# Patient Record
Sex: FEMALE | Race: WHITE | Employment: OTHER | URBAN - METROPOLITAN AREA
[De-identification: names, ages, dates, MRNs, and addresses within clinical notes are randomized per-mention and may not be internally consistent; named-entity substitution may affect disease eponyms.]

---

## 2017-01-03 ENCOUNTER — CLINICAL SUPPORT (OUTPATIENT)
Dept: GERIATRIC MEDICINE | Age: 82
End: 2017-01-03

## 2017-01-03 DIAGNOSIS — M81.0 OSTEOPOROSIS: Primary | ICD-10-CM

## 2017-01-03 NOTE — Clinical Note
Pt had self-supplied prolia pre-filled syringe  injection from [facility] , that was ordered by Dr. Rocio Acosta to be given today. Next injection will be due in six months from today if still needed.

## 2017-01-03 NOTE — PROGRESS NOTES
Chief Complaint   Patient presents with    Injection     Q 6 months prolia injection     After obtaining consent, and per orders of Dr. Lin Davenport, injection of Prolia 60mg/ml  was given. Medication GTIN # [id number],   SN# [id number]   Lot # [id number]  exp. Date 04/2019  Pt had self-supplied pre-filled syringe ( 1ml=60mg) Prolia. Injection was given subcutaneously on the left deltoid. Patient instructed to wait in the lobby for 15 minutes post-injection for monitoring for any symptoms of reactions. None was reported. Her next injection will not be due for another six months.

## 2017-01-04 ENCOUNTER — OFFICE VISIT (OUTPATIENT)
Dept: GERIATRIC MEDICINE | Age: 82
End: 2017-01-04

## 2017-01-04 VITALS
HEIGHT: 59 IN | OXYGEN SATURATION: 97 % | BODY MASS INDEX: 22.98 KG/M2 | RESPIRATION RATE: 16 BRPM | TEMPERATURE: 96.1 F | HEART RATE: 66 BPM | DIASTOLIC BLOOD PRESSURE: 55 MMHG | WEIGHT: 114 LBS | SYSTOLIC BLOOD PRESSURE: 135 MMHG

## 2017-01-04 DIAGNOSIS — H61.21 HEARING LOSS DUE TO CERUMEN IMPACTION, RIGHT: Primary | ICD-10-CM

## 2017-01-04 DIAGNOSIS — H61.21 IMPACTED CERUMEN OF RIGHT EAR: ICD-10-CM

## 2017-01-04 NOTE — PROGRESS NOTES
Patient seen today for cerumen impaction of the right ear. Patient states she \"knows\" when this occurs because her hearing is decreased. She admits fullness in ear, but denies any ringing, pain, or discharge. Otherwise patient has no complaints today. She has tolerated cerumen removal well. Patient to follow up PRN    Orders written and sent to pharmacy for Debrox, 2 times weekly at night, 5 drops in the right ear.

## 2017-01-11 ENCOUNTER — OFFICE VISIT (OUTPATIENT)
Dept: GERIATRIC MEDICINE | Age: 82
End: 2017-01-11

## 2017-01-11 VITALS
RESPIRATION RATE: 16 BRPM | OXYGEN SATURATION: 97 % | DIASTOLIC BLOOD PRESSURE: 52 MMHG | HEART RATE: 66 BPM | BODY MASS INDEX: 22.66 KG/M2 | WEIGHT: 112.2 LBS | TEMPERATURE: 96 F | SYSTOLIC BLOOD PRESSURE: 138 MMHG

## 2017-01-11 DIAGNOSIS — L20.9 ATOPIC DERMATITIS, UNSPECIFIED: Primary | ICD-10-CM

## 2017-01-11 DIAGNOSIS — L29.9 ITCHING: ICD-10-CM

## 2017-01-11 RX ORDER — CLOTRIMAZOLE AND BETAMETHASONE DIPROPIONATE 10; .64 MG/G; MG/G
CREAM TOPICAL
Qty: 15 G | Refills: 0 | Status: SHIPPED | OUTPATIENT
Start: 2017-01-11 | End: 2018-10-16

## 2017-01-11 RX ORDER — CLOTRIMAZOLE AND BETAMETHASONE DIPROPIONATE 10; .64 MG/G; MG/G
CREAM TOPICAL
Qty: 15 G | Refills: 0 | Status: SHIPPED | OUTPATIENT
Start: 2017-01-11 | End: 2017-01-11 | Stop reason: SDUPTHER

## 2017-01-11 NOTE — PATIENT INSTRUCTIONS

## 2017-01-11 NOTE — PROGRESS NOTES
HISTORY OF PRESENT ILLNESS  Son Shore is a 80 y.o. female. Rash    Associated symptoms include itching. Ms. Page Burt presents today with complaints of a rash that started a few weeks ago and has progressed from her left shoulder to her left chest. Patient states she has not changed any lotion, body wash, detergents, or new clothing that might be causing her symptoms. She states the area is itchy and slightly bothersome. Patient states she has not tried anything at home. The patient does not know where she would have come in contact with anything to cause the rash but is concerned since it is moving down her chest.     Review of Systems   Constitutional: Negative for chills, fever and malaise/fatigue. HENT: Negative. Eyes: Negative. Respiratory: Negative. Cardiovascular: Negative. Gastrointestinal: Negative. Genitourinary: Negative. Musculoskeletal: Negative. Skin: Positive for itching and rash. Neurological: Negative. Negative for weakness. Psychiatric/Behavioral: Negative. Physical Exam   Constitutional: She is oriented to person, place, and time. She appears well-developed and well-nourished. HENT:   Head: Normocephalic and atraumatic. Mouth/Throat: Oropharynx is clear and moist.   Eyes: EOM are normal. Pupils are equal, round, and reactive to light. Neck: Normal range of motion. Neck supple. Cardiovascular: Normal rate, regular rhythm, normal heart sounds and intact distal pulses. Exam reveals no gallop and no friction rub. No murmur heard. Pulmonary/Chest: Effort normal and breath sounds normal. She has no wheezes. She has no rales. She exhibits no tenderness. Abdominal: Soft. Bowel sounds are normal.   Musculoskeletal: Normal range of motion. She exhibits no edema, tenderness or deformity. Lymphadenopathy:     She has no cervical adenopathy. Neurological: She is alert and oriented to person, place, and time. She has normal reflexes.    Skin: Skin is warm and dry. Rash noted. There is erythema. No pallor. Small patchy area of diffuse, slightly raised erythematous on left chest.      Psychiatric: She has a normal mood and affect. Her behavior is normal. Thought content normal.       ASSESSMENT and PLAN    ICD-10-CM ICD-9-CM    1. Atopic dermatitis, unspecified L20.9 691.8 clotrimazole-betamethasone (LOTRISONE) topical cream   2. Itching L29.9 698.9 clotrimazole-betamethasone (LOTRISONE) topical cream     Follow-up Disposition:  Return if symptoms worsen or fail to improve.

## 2017-03-24 ENCOUNTER — OFFICE VISIT (OUTPATIENT)
Dept: GERIATRIC MEDICINE | Age: 82
End: 2017-03-24

## 2017-03-24 VITALS
TEMPERATURE: 96.2 F | SYSTOLIC BLOOD PRESSURE: 136 MMHG | OXYGEN SATURATION: 95 % | HEIGHT: 59 IN | HEART RATE: 79 BPM | WEIGHT: 112.2 LBS | DIASTOLIC BLOOD PRESSURE: 51 MMHG | BODY MASS INDEX: 22.62 KG/M2

## 2017-03-24 DIAGNOSIS — Z00.00 MEDICARE ANNUAL WELLNESS VISIT, SUBSEQUENT: Primary | ICD-10-CM

## 2017-03-24 DIAGNOSIS — Z12.11 SCREEN FOR COLON CANCER: ICD-10-CM

## 2017-03-24 DIAGNOSIS — Z71.89 ADVANCED DIRECTIVES, COUNSELING/DISCUSSION: ICD-10-CM

## 2017-03-24 DIAGNOSIS — Z13.39 SCREENING FOR ALCOHOLISM: ICD-10-CM

## 2017-03-24 DIAGNOSIS — Z13.31 SCREENING FOR DEPRESSION: ICD-10-CM

## 2017-03-24 NOTE — PROGRESS NOTES
Subjective:     Chief Complaint   Patient presents with    Annual Wellness Visit     she is a 80y.o. year old female who presents for evalution. Current Outpatient Prescriptions   Medication Sig Dispense Refill    simvastatin (ZOCOR) 10 mg tablet Take 1 Tab by mouth nightly. Indications: HYPERLIPIDEMIA 90 Tab 1    calcium carbonate (CALTREX) 600 mg (1,500 mg) tablet Take 1 Tab by mouth two (2) times a day. 60 Tab 5    hydrochlorothiazide (HYDRODIURIL) 12.5 mg tablet Take 1 Tab by mouth daily. 90 Tab 1    acetaminophen (TYLENOL) 325 mg tablet Take  by mouth every four (4) hours as needed for Pain.  peg 400-propylene glycol (SYSTANE) 0.4-0.3 % drop Administer 1 Drop to both eyes four (4) times daily.  amoxicillin (AMOXIL) 500 mg capsule Take 500 mg by mouth. Take 5 caps one hour prior to dental appointments. Indications: prophylactic      atenolol (TENORMIN) 100 mg tablet Take 1 Tab by mouth daily. 90 Tab 3    metronidazole (METROLOTION) 0.75 % lotion Apply  to affected area nightly. (Patient taking differently: Apply  to affected area every morning. Indications: ACNE ROSACEA) 59 mL 1    aspirin 81 mg tablet Take 81 mg by mouth daily.  MULTIVITAMIN PO Take  by mouth daily.  clotrimazole-betamethasone (LOTRISONE) topical cream Apply twice daily to affected area x 10 days. 15 g 0    carbamide peroxide (DEBROX) 6.5 % otic solution Administer 5 Drops in right ear two (2) times a week. 150 mL 1    cholecalciferol (VITAMIN D3) 1,000 unit tablet Take 1 Tab by mouth daily.  30 Tab 11        Allergies   Allergen Reactions    Latex Rash    Orabase (Benzocaine) [Benzocaine] Anaphylaxis    Adhesive Rash    Codeine Nausea and Vomiting    Darvocet A500 [Propoxyphene N-Acetaminophen] Unknown (comments)    Hydrocodone Unknown (comments)        Objective:     Vitals:    03/24/17 0917   BP: 136/51   Pulse: 79   Temp: 96.2 °F (35.7 °C)   TempSrc: Oral   SpO2: 95%   Weight: 112 lb 3.2 oz (50.9 kg) Height: 4' 11\" (1.499 m)       ROS    Physical Exam

## 2017-03-24 NOTE — PROGRESS NOTES
Patient is here for medicare wellness. She is having a issue with loose bowels since increasing her calcium. Visit Vitals    /51 (BP 1 Location: Left arm, BP Patient Position: Sitting)    Pulse 79    Temp 96.2 °F (35.7 °C) (Oral)    Ht 4' 11\" (1.499 m)    Wt 112 lb 3.2 oz (50.9 kg)    SpO2 95%    BMI 22.66 kg/m2     1. Have you been to the ER, urgent care clinic since your last visit? Hospitalized since your last visit?no    2. Have you seen or consulted any other health care providers outside of the [facility] since your last visit? Include any pap smears or colon screening.  no

## 2017-03-24 NOTE — PATIENT INSTRUCTIONS
Medicare Part B Preventive Services Limitations Recommendation Scheduled   Bone Mass Measurement  (age 72 & older, biennial) Requires diagnosis related to osteoporosis or estrogen deficiency. Biennial benefit unless patient has history of long-term glucocorticoid tx or baseline is needed because initial test was by other method     Cardiovascular Screening Blood Tests (every 5 years)  Total cholesterol, HDL, Triglycerides Order as a panel if possible     Colorectal Cancer Screening  -Fecal occult blood test (annual)  -Flexible sigmoidoscopy (5y)  -Screening colonoscopy (10y)  -Barium Enema      Counseling to Prevent Tobacco Use (up to 8 sessions per year)  - Counseling greater than 3 and up to 10 minutes  - Counseling greater than 10 minutes Patients must be asymptomatic of tobacco-related conditions to receive as preventive service     Diabetes Screening Tests (at least every 3 years, Medicare covers annually or at 6-month intervals for prediabetic patients)    Fasting blood sugar (FBS) or glucose tolerance test (GTT) Patient must be diagnosed with one of the following:  -Hypertension, Dyslipidemia, obesity, previous impaired FBS or GTT  Or any two of the following: overweight, FH of diabetes, age ? 72, history of gestational diabetes, birth of baby weighing more than 9 pounds     Diabetes Self-Management Training (DSMT) (no USPSTF recommendation) Requires referral by treating physician for patient with diabetes or renal disease. 10 hours of initial DSMT session of no less than 30 minutes each in a continuous 12-month period. 2 hours of follow-up DSMT in subsequent years.      Glaucoma Screening (no USPSTF recommendation) Diabetes mellitus, family history, , age 48 or over,  American, age 72 or over     Human Immunodeficiency Virus (HIV) Screening (annually for increased risk patients)  HIV-1 and HIV-2 by EIA, MACIEJ, rapid antibody test, or oral mucosa transudate Patient must be at increased risk for HIV infection per USPSTF guidelines or pregnant. Tests covered annually for patients at increased risk. Pregnant patients may receive up to 3 test during pregnancy. Medical Nutrition Therapy (MNT) (for diabetes or renal disease not recommended schedule) Requires referral by treating physician for patient with diabetes or renal disease. Can be provided in same year as diabetes self-management training (DSMT), and CMS recommends medical nutrition therapy take place after DSMT. Up to 3 hours for initial year and 2 hours in subsequent years. Shingles Vaccination A shingles vaccine is also recommended once in a lifetime after age 61     Seasonal Influenza Vaccination (annually)      Pneumococcal Vaccination (once after 72)      Hepatitis B Vaccinations (if medium/high risk) Medium/high risk factors:  End-stage renal disease,  Hemophiliacs who received Factor VIII or IX concentrates, Clients of institutions for the mentally retarded, Persons who live in the same house as a HepB virus carrier, Homosexual men, Illicit injectable drug abusers. Screening Mammography (biennial age 54-69) Annually (age 36 or over)     Screening Pap Tests and Pelvic Examination (up to age 79 and after 79 if unknown history or abnormal study last 10 years) Every 25 months except high risk     Ultrasound Screening for Abdominal Aortic Aneurysm (AAA) (once) Patient must be referred through [facility] and not have had a screening for abdominal aortic aneurysm before under Medicare.   Limited to patients who meet one of the following criteria:  - Men who are 73-68 years old and have smoked more than 100 cigarettes in their lifetime.  -Anyone with a FH of AAA  -Anyone recommended for screening by USPSTF

## 2017-03-24 NOTE — PROGRESS NOTES
This is a Subsequent Medicare Annual Wellness Visit providing Personalized Prevention Plan Services (PPPS) (Performed 12 months after initial AWV and PPPS )    I have reviewed the patient's medical history in detail and updated the computerized patient record. History     Past Medical History:   Diagnosis Date    Allergic rhinitis     Arthritis     Benign hematuria     negative w/u by Dr. Urban Dear Hematuria     Hypertension     Osteoarthritis     Osteopenia     Overactive bladder     Rosacea     Rosacea conjunctivitis(372.31)     Shingles     Stress incontinence       Past Surgical History:   Procedure Laterality Date    ENDOSCOPY, COLON, DIAGNOSTIC  7/2005    (Ishmael Ring)    HX CATARACT REMOVAL  7/2004    bilateral    HX ORTHOPAEDIC      toe surgery for bone spurs    HX IRENE AND BSO  1980    TOTAL HIP ARTHROPLASTY  2000    left    TOTAL HIP ARTHROPLASTY  2001    right     Current Outpatient Prescriptions   Medication Sig Dispense Refill    simvastatin (ZOCOR) 10 mg tablet Take 1 Tab by mouth nightly. Indications: HYPERLIPIDEMIA 90 Tab 1    calcium carbonate (CALTREX) 600 mg (1,500 mg) tablet Take 1 Tab by mouth two (2) times a day. 60 Tab 5    hydrochlorothiazide (HYDRODIURIL) 12.5 mg tablet Take 1 Tab by mouth daily. 90 Tab 1    acetaminophen (TYLENOL) 325 mg tablet Take  by mouth every four (4) hours as needed for Pain.  peg 400-propylene glycol (SYSTANE) 0.4-0.3 % drop Administer 1 Drop to both eyes four (4) times daily.  amoxicillin (AMOXIL) 500 mg capsule Take 500 mg by mouth. Take 5 caps one hour prior to dental appointments. Indications: prophylactic      atenolol (TENORMIN) 100 mg tablet Take 1 Tab by mouth daily. 90 Tab 3    metronidazole (METROLOTION) 0.75 % lotion Apply  to affected area nightly. (Patient taking differently: Apply  to affected area every morning. Indications: ACNE ROSACEA) 59 mL 1    aspirin 81 mg tablet Take 81 mg by mouth daily.       MULTIVITAMIN PO Take  by mouth daily.  clotrimazole-betamethasone (LOTRISONE) topical cream Apply twice daily to affected area x 10 days. 15 g 0    carbamide peroxide (DEBROX) 6.5 % otic solution Administer 5 Drops in right ear two (2) times a week. 150 mL 1    cholecalciferol (VITAMIN D3) 1,000 unit tablet Take 1 Tab by mouth daily. 30 Tab 11     Allergies   Allergen Reactions    Latex Rash    Orabase (Benzocaine) [Benzocaine] Anaphylaxis    Adhesive Rash    Codeine Nausea and Vomiting    Darvocet A500 [Propoxyphene N-Acetaminophen] Unknown (comments)    Hydrocodone Unknown (comments)     Family History   Problem Relation Age of Onset    Stroke Mother      x2    Heart Attack Mother 61    Parkinsonism Father     Breast Cancer Sister     Hypertension Sister     Arthritis-osteo Sister     Heart defect Sister      MVP    Stroke Sister     Arthritis-osteo Sister     Hypertension Sister     Diabetes Sister     Breast Cancer Sister     Other Sister      polycythemia vera    Allergic Rhinitis Son     Hypertension Son     Headache Son     Allergic Rhinitis Daughter     Allergic Rhinitis Son      Social History   Substance Use Topics    Smoking status: Never Smoker    Smokeless tobacco: Never Used    Alcohol use No     Patient Active Problem List   Diagnosis Code    HTN (hypertension) I10    Hyperlipidemia E78.5    Nonallergic rhinitis J31.0    OAB (overactive bladder) N32.81       Depression Risk Factor Screening:     PHQ 2 / 9, over the last two weeks 3/24/2017   Little interest or pleasure in doing things Not at all   Feeling down, depressed or hopeless Not at all   Total Score PHQ 2 0     Alcohol Risk Factor Screening: On any occasion during the past 3 months, have you had more than 3 drinks containing alcohol? No    Do you average more than 7 drinks per week? No      Functional Ability and Level of Safety:     Hearing Loss   none    Activities of Daily Living   Self-care.    Requires assistance with: no ADLs    Fall Risk     Fall Risk Assessment, last 12 mths 3/24/2017   Able to walk? Yes   Fall in past 12 months? No     Abuse Screen   None  Patient is not abused    Review of Systems   Constitutional: negative for fevers, chills, sweats, fatigue, malaise, anorexia and weight loss  Eyes: negative for visual disturbance, redness and icterus  Ears, nose, mouth, throat, and face: negative for hearing loss, tinnitus, ear drainage, nasal congestion, sore mouth, sore throat and voice change  Respiratory: negative for cough, sputum, asthma, wheezing or dyspnea on exertion  Cardiovascular: negative for chest pain, chest pressure/discomfort, dyspnea, syncope, fatigue, lower extremity edema  Gastrointestinal: negative for dysphagia, reflux symptoms, nausea, vomiting, change in bowel habits, diarrhea and constipation  Genitourinary:negative for frequency, nocturia and urinary incontinence  Integument/breast: negative for rash, skin lesion(s), pruritus, dryness, skin color change and breast tenderness  Hematologic/lymphatic: negative  Musculoskeletal:negative for myalgias, arthralgias, stiff joints, neck pain, back pain and muscle weakness  Neurological: negative for headaches, dizziness, seizures, memory problems, speech problems, paresthesia and coordination problems  Behavioral/Psych: negative for anxiety and depression  Endocrine: negative for temperature intolerance  Allergic/Immunologic: negative for urticaria and hay fever    Physical Examination     Evaluation of Cognitive Function:  Mood/affect:  happy  Appearance: age appropriate and casually dressed      Visit Vitals    /51 (BP 1 Location: Left arm, BP Patient Position: Sitting)    Pulse 79    Temp 96.2 °F (35.7 °C) (Oral)    Ht 4' 11\" (1.499 m)    Wt 112 lb 3.2 oz (50.9 kg)    SpO2 95%    BMI 22.66 kg/m2     General:  Alert, cooperative, no distress, appears stated age. Head:  Normocephalic, without obvious abnormality, atraumatic. Eyes:  Conjunctivae/corneas clear. PERRL, EOMs intact. Fundi benign. Ears:  Normal TMs and external ear canals both ears. Nose: Nares normal. Septum midline. Mucosa normal. No drainage or sinus tenderness. Throat: Lips, mucosa, and tongue normal. Teeth and gums normal.   Neck: Supple, symmetrical, trachea midline, no adenopathy, thyroid: no enlargement/tenderness/nodules, no carotid bruit and no JVD. Back:   Symmetric, no curvature. ROM normal. No CVA tenderness. Lungs:   Clear to auscultation bilaterally. Chest wall:  No tenderness or deformity. Heart:  Regular rate and rhythm, S1, S2 normal, no murmur, click, rub or gallop. Breast Exam:  No tenderness, masses, or nipple abnormality. Abdomen:   Soft, non-tender. Bowel sounds normal. No masses,  No organomegaly. Genitalia:  Normal female without lesion, discharge or tenderness. Rectal:  Normal tone,  no masses or tenderness  Guaiac negative stool. Extremities: Extremities normal, atraumatic, no cyanosis or edema. Pulses: 2+ and symmetric all extremities. Skin: Skin color, texture, turgor normal. No rashes or lesions. Lymph nodes: Cervical, supraclavicular, and axillary nodes normal.   Neurologic: CNII-XII intact. Normal strength, sensation and reflexes throughout. Advice/Referrals/Counseling   Education and counseling provided:  Are appropriate based on today's review and evaluation  End-of-Life planning (with patient's consent)  Screening Mammography  Bone mass measurement (DEXA)      Assessment/Plan       ICD-10-CM ICD-9-CM    1. Medicare annual wellness visit, subsequent Z00.00 V70.0 DEPRESSION SCREEN ANNUAL      DO NOT RESUSCITATE      CANCELED: ADVANCE CARE PLANNING FIRST 30 MINS   2. Routine general medical examination at a health care facility Z00.00 V70.0 CANCELED: ADVANCE CARE PLANNING FIRST 30 MINS   3. Screening for alcoholism Z13.89 V79.1    4.  Advanced directives, counseling/discussion Z71.89 V65.49 DO NOT RESUSCITATE 5. Screening for depression Z13.89 V79.0    6. Screen for colon cancer Z12.11 V76.51    7. Encounter for screening mammogram for malignant neoplasm of breast Z12.31 V76.12    .  Plan / Recommendations:     Mrs. Obed Patel is in excellent health at this time. She will follow up with us as needed for continued care.

## 2017-04-07 ENCOUNTER — OFFICE VISIT (OUTPATIENT)
Dept: GERIATRIC MEDICINE | Age: 82
End: 2017-04-07

## 2017-04-07 VITALS
HEART RATE: 64 BPM | TEMPERATURE: 97.4 F | WEIGHT: 114 LBS | OXYGEN SATURATION: 97 % | BODY MASS INDEX: 21.52 KG/M2 | SYSTOLIC BLOOD PRESSURE: 128 MMHG | DIASTOLIC BLOOD PRESSURE: 99 MMHG | RESPIRATION RATE: 16 BRPM | HEIGHT: 61 IN

## 2017-04-07 DIAGNOSIS — Z12.89 ENCOUNTER FOR SCREENING FOR MALIGNANT NEOPLASM OF OTHER SITES: ICD-10-CM

## 2017-04-07 DIAGNOSIS — K64.4 EXTERNAL HEMORRHOIDS WITHOUT COMPLICATION: ICD-10-CM

## 2017-04-07 DIAGNOSIS — D64.9 ANEMIA, UNSPECIFIED TYPE: ICD-10-CM

## 2017-04-07 DIAGNOSIS — N39.46 MIXED STRESS AND URGE URINARY INCONTINENCE: Primary | ICD-10-CM

## 2017-04-07 DIAGNOSIS — N81.10 BLADDER PROLAPSE, FEMALE, ACQUIRED: ICD-10-CM

## 2017-04-07 DIAGNOSIS — M81.8 OTHER OSTEOPOROSIS WITHOUT CURRENT PATHOLOGICAL FRACTURE: ICD-10-CM

## 2017-04-07 DIAGNOSIS — E83.19 OTHER DISORDERS OF IRON METABOLISM: ICD-10-CM

## 2017-04-07 DIAGNOSIS — M19.012 OSTEOARTHRITIS OF LEFT SHOULDER, UNSPECIFIED OSTEOARTHRITIS TYPE: ICD-10-CM

## 2017-04-07 DIAGNOSIS — H02.60 XANTHELASMA OF EYELID, UNSPECIFIED LATERALITY: ICD-10-CM

## 2017-04-11 LAB
HEMOCCULT STL QL: POSITIVE
VALID INTERNAL CONTROL?: YES

## 2017-04-12 NOTE — PROGRESS NOTES
HISTORY OF PRESENT ILLNESS  Philomena Gonzalez is a 80 y.o. female. HPI Comments: Ms. Narendra Centeno presents today with multiple issues listed on a pieces of paper so she would not forget it. She is here for a general exam and to discuss her medical concerns. She is concerned with Left Shoulder pain, urinary incontinence, bladder prolapsing, diarrhea, and suspected hemorrhoids. Urinary Incontinence/Bladder Prolapsing: patient reports this has been increasing in the last 6 to 8 months and the severity seems to be dependent on her bladder prolapsing from her vagina. She states it is uncomfortable but not painful really, just worrysome. She states she is able to push her bladder back inside of her vagina but it is starting to worry her. She states she does not have any abnormal discharge, color or odor. Left Shoulder Pain: patient states she started with left shoulder pain for the last week or so. She states that she knows she did not injury herself or do anything that would have caused the pain. She is unable to raise her arm above chest level and states she has not injured the shoulder in the past. She also states she is unable to bend over and tie her shoes because the shoulder pain becomes sharp and intense. She has tried tylenol and it works for a few hours but she is not sure what is causing it. Diarrhea: Patient states she has started with loose bowels about 3 weeks ago. She also states she has a \"odd\" clear mucous discharge that seems to be coming from a \"flapp\" of skin in her rectum area. She states she has had a colonoscopy about 15 + years ago at [facility] here in [place] but can not remember who her GI dr was. She states she has not seen a change in the color or consistency of the stools since they started and she is most concerned about the \"explosive\" urgency to have a bowel movement. She also states she has not seen any signs of bleeding or blood in her stool.            Review of Systems   Constitutional: Negative for chills, fever, malaise/fatigue and weight loss. HENT: Negative for congestion, ear pain, hearing loss and sore throat. Eyes: Negative for blurred vision, double vision, pain and redness. Respiratory: Negative for cough, shortness of breath, wheezing and stridor. Cardiovascular: Negative for chest pain, palpitations, orthopnea, leg swelling and PND. Gastrointestinal: Positive for diarrhea. Negative for abdominal pain, blood in stool, constipation, heartburn, nausea and vomiting. Genitourinary: Positive for frequency and urgency. Musculoskeletal: Positive for joint pain and myalgias. Negative for back pain, falls and neck pain. Skin: Negative. Neurological: Negative for dizziness, tingling, tremors, sensory change, focal weakness, seizures, loss of consciousness, weakness and headaches. Psychiatric/Behavioral: Negative for depression, memory loss and substance abuse. The patient is not nervous/anxious and does not have insomnia. Physical Exam   Constitutional: She is oriented to person, place, and time. No distress. HENT:   Head: Normocephalic and atraumatic. Right Ear: External ear normal.   Left Ear: External ear normal.   Nose: Nose normal.   Mouth/Throat: Oropharynx is clear and moist.   Eyes: Conjunctivae and EOM are normal. Pupils are equal, round, and reactive to light. Right eye exhibits discharge. No scleral icterus. Neck: Normal range of motion. Neck supple. No JVD present. No tracheal deviation present. No thyromegaly present. Cardiovascular: Normal rate, regular rhythm, normal heart sounds and intact distal pulses. Exam reveals no gallop and no friction rub. No murmur heard. Pulmonary/Chest: Effort normal and breath sounds normal. No respiratory distress. She has no wheezes. She has no rales. Abdominal: Soft. Normal appearance and bowel sounds are normal. She exhibits no distension and no mass. There is no tenderness.  There is no rebound, no guarding and no CVA tenderness. Genitourinary: Rectal exam shows external hemorrhoid, internal hemorrhoid and anal tone abnormal.   Genitourinary Comments: Vagina wall is weak and minimal muscle tone. I was unable to palpate the bladder in the vaginal canal. Nothing abnormal with vaginal exam. Nurse Rosendo Chen assisted. Musculoskeletal: She exhibits no edema. Left shoulder: She exhibits decreased range of motion, tenderness, pain and decreased strength. Lymphadenopathy:     She has no cervical adenopathy. Neurological: She is alert and oriented to person, place, and time. She has normal strength and normal reflexes. She is not disoriented. No cranial nerve deficit or sensory deficit. Coordination and gait normal.   Skin: Skin is warm, dry and intact. Lesion noted. No rash noted. She is not diaphoretic. No cyanosis or erythema. No pallor. Nails show no clubbing. Multiple Xanthelasma of eyelid forehead and cheeks. They are various sizes and bother the patient only with appearance. Psychiatric: She has a normal mood and affect. Her behavior is normal. Judgment and thought content normal.     BP Readings from Last 3 Encounters:   04/07/17 (!) 128/99   03/24/17 136/51   01/11/17 138/52     Wt Readings from Last 3 Encounters:   04/07/17 114 lb (51.7 kg)   03/24/17 112 lb 3.2 oz (50.9 kg)   01/11/17 112 lb 3.2 oz (50.9 kg)     Estimated body mass index is 21.25 kg/(m^2) as calculated from the following:    Height as of this encounter: 5' 1.42\" (1.56 m). Weight as of this encounter: 114 lb (51.7 kg). PLAN TODAY: Perform a vaginal exam to establish bladder prolapse vs atrophy, Perform a rectal exam to establish rectal abnormality vs hemorrhoids. Order a left shoulder xray to establish arthritis vs trauma, Order annual labs to evaluate patient's laboratory status and health. ASSESSMENT and PLAN    ICD-10-CM ICD-9-CM    1.  Mixed stress and urge urinary incontinence N39.46 788.33 CA SCREEN;PELVIC/BREAST EXAM      REFERRAL TO UROGYNECOLOGY   2. External hemorrhoids without complication N76.3 222.9 DIGITAL RECTAL EXAMINATION,      CULTURE, STOOL      OCCULT BLOOD, IMMUNOASSAY (FIT)   3. Xanthelasma of eyelid, unspecified laterality H02.60 272.2 LIPID PANEL     374.51    4. Bladder prolapse, female, acquired N81.10 618.01 CA SCREEN;PELVIC/BREAST EXAM      HEMOGLOBIN A1C WITH EAG      REFERRAL TO UROGYNECOLOGY   5. Osteoarthritis of left shoulder, unspecified osteoarthritis type M19.012 715.91 XR SHOULDER LT AP/LAT MIN 2 V   6. Anemia, unspecified type D64.9 285.9 CBC WITH AUTOMATED DIFF      METABOLIC PANEL, COMPREHENSIVE   7. Encounter for screening for malignant neoplasm of other sites  Z12.89 V76.49 CA SCREEN;PELVIC/BREAST EXAM   8. Other disorders of iron metabolism  E83.19 275.09 HEMOGLOBIN A1C WITH EAG      TSH 3RD GENERATION   9. Other osteoporosis without current pathological fracture  M81.8 733.09 TSH 3RD GENERATION       Vaginal Exam: Revealed a weak, postmenopausal vaginal atrophy. Unable to palpate the bladder. Patient agrees to a referral to a urological gynecologist for possible treatment pitons. She requests a female physician but there is only one in our area and she is unable to take her insurance. Will refer to another provider. Diarrhea: POC hemoccult was positive for occult blood. Advised patient I would like her to see a GI dr but she wants to work it up a bit more since leaving the community to go out to a physician is difficult further without transportation. I agreed and ordered the above tests to evaluate the diarrhea and blood in stool vs internal hemorrhoids. Patient will return on Thursday April 13th.

## 2017-04-12 NOTE — PATIENT INSTRUCTIONS
Diarrhea: Care Instructions  Your Care Instructions    Diarrhea is loose, watery stools (bowel movements). The exact cause is often hard to find. Sometimes diarrhea is your body's way of getting rid of what caused an upset stomach. Viruses, food poisoning, and many medicines can cause diarrhea. Some people get diarrhea in response to emotional stress, anxiety, or certain foods. Almost everyone has diarrhea now and then. It usually isn't serious, and your stools will return to normal soon. The important thing to do is replace the fluids you have lost, so you can prevent dehydration. The doctor has checked you carefully, but problems can develop later. If you notice any problems or new symptoms, get medical treatment right away. Follow-up care is a key part of your treatment and safety. Be sure to make and go to all appointments, and call your doctor if you are having problems. It's also a good idea to know your test results and keep a list of the medicines you take. How can you care for yourself at home? · Watch for signs of dehydration, which means your body has lost too much water. Dehydration is a serious condition and should be treated right away. Signs of dehydration are:  ¨ Increasing thirst and dry eyes and mouth. ¨ Feeling faint or lightheaded. ¨ Darker urine, and a smaller amount of urine than normal.  · To prevent dehydration, drink plenty of fluids, enough so that your urine is light yellow or clear like water. Choose water and other caffeine-free clear liquids until you feel better. If you have kidney, heart, or liver disease and have to limit fluids, talk with your doctor before you increase the amount of fluids you drink. · Begin eating small amounts of mild foods the next day, if you feel like it. ¨ Try yogurt that has live cultures of Lactobacillus. (Check the label.)  ¨ Avoid spicy foods, fruits, alcohol, and caffeine until 48 hours after all symptoms are gone.   ¨ Avoid chewing gum that contains sorbitol. ¨ Avoid dairy products (except for yogurt with Lactobacillus) while you have diarrhea and for 3 days after symptoms are gone. · The doctor may recommend that you take over-the-counter medicine, such as loperamide (Imodium), if you still have diarrhea after 6 hours. Read and follow all instructions on the label. Do not use this medicine if you have bloody diarrhea, a high fever, or other signs of serious illness. Call your doctor if you think you are having a problem with your medicine. When should you call for help? Call 911 anytime you think you may need emergency care. For example, call if:  · You passed out (lost consciousness). · Your stools are maroon or very bloody. Call your doctor now or seek immediate medical care if:  · You are dizzy or lightheaded, or you feel like you may faint. · Your stools are black and look like tar, or they have streaks of blood. · You have new or worse belly pain. · You have symptoms of dehydration, such as:  ¨ Dry eyes and a dry mouth. ¨ Passing only a little dark urine. ¨ Feeling thirstier than usual.  · You have a new or higher fever. Watch closely for changes in your health, and be sure to contact your doctor if:  · Your diarrhea is getting worse. · You see pus in the diarrhea. · You are not getting better after 2 days (48 hours). Where can you learn more? Go to http://andriy-francisco.info/. Enter A089 in the search box to learn more about \"Diarrhea: Care Instructions. \"  Current as of: May 27, 2016  Content Version: 11.2  © 0774-3477 [email or web address]. Care instructions adapted under license by [employer] (which disclaims liability or warranty for this information). If you have questions about a medical condition or this instruction, always ask your healthcare professional. [address] any warranty or liability for your use of this information.

## 2017-04-13 ENCOUNTER — LAB ONLY (OUTPATIENT)
Dept: GERIATRIC MEDICINE | Age: 82
End: 2017-04-13

## 2017-04-13 DIAGNOSIS — K64.4 EXTERNAL HEMORRHOIDS WITHOUT COMPLICATION: ICD-10-CM

## 2017-04-13 DIAGNOSIS — E83.19 OTHER DISORDERS OF IRON METABOLISM: ICD-10-CM

## 2017-04-13 DIAGNOSIS — R73.03 PREDIABETES: Primary | ICD-10-CM

## 2017-04-13 DIAGNOSIS — D64.9 ANEMIA, UNSPECIFIED TYPE: ICD-10-CM

## 2017-04-13 DIAGNOSIS — H02.60 XANTHELASMA OF EYELID, UNSPECIFIED LATERALITY: ICD-10-CM

## 2017-04-13 DIAGNOSIS — N81.10 BLADDER PROLAPSE, FEMALE, ACQUIRED: ICD-10-CM

## 2017-04-13 DIAGNOSIS — M81.8 OTHER OSTEOPOROSIS WITHOUT CURRENT PATHOLOGICAL FRACTURE: ICD-10-CM

## 2017-04-14 LAB
ALBUMIN SERPL-MCNC: 4 G/DL (ref 3.5–4.7)
ALBUMIN/GLOB SERPL: 1.8 {RATIO} (ref 1.2–2.2)
ALP SERPL-CCNC: 75 IU/L (ref 39–117)
ALT SERPL-CCNC: 16 IU/L (ref 0–32)
AST SERPL-CCNC: 35 IU/L (ref 0–40)
BASOPHILS # BLD AUTO: 0 X10E3/UL (ref 0–0.2)
BASOPHILS NFR BLD AUTO: 0 %
BILIRUB SERPL-MCNC: 0.6 MG/DL (ref 0–1.2)
BUN SERPL-MCNC: 18 MG/DL (ref 8–27)
BUN/CREAT SERPL: 25 (ref 12–28)
CALCIUM SERPL-MCNC: 9.2 MG/DL (ref 8.7–10.3)
CHLORIDE SERPL-SCNC: 96 MMOL/L (ref 96–106)
CHOLEST SERPL-MCNC: 144 MG/DL (ref 100–199)
CO2 SERPL-SCNC: 25 MMOL/L (ref 18–29)
CREAT SERPL-MCNC: 0.73 MG/DL (ref 0.57–1)
EOSINOPHIL # BLD AUTO: 0.1 X10E3/UL (ref 0–0.4)
EOSINOPHIL NFR BLD AUTO: 1 %
ERYTHROCYTE [DISTWIDTH] IN BLOOD BY AUTOMATED COUNT: 14 % (ref 12.3–15.4)
EST. AVERAGE GLUCOSE BLD GHB EST-MCNC: 134 MG/DL
GLOBULIN SER CALC-MCNC: 2.2 G/DL (ref 1.5–4.5)
GLUCOSE SERPL-MCNC: 101 MG/DL (ref 65–99)
HBA1C MFR BLD: 6.3 % (ref 4.8–5.6)
HCT VFR BLD AUTO: 39.4 % (ref 34–46.6)
HDLC SERPL-MCNC: 78 MG/DL
HGB BLD-MCNC: 13 G/DL (ref 11.1–15.9)
IMM GRANULOCYTES # BLD: 0 X10E3/UL (ref 0–0.1)
IMM GRANULOCYTES NFR BLD: 0 %
LDLC SERPL CALC-MCNC: 55 MG/DL (ref 0–99)
LYMPHOCYTES # BLD AUTO: 1.1 X10E3/UL (ref 0.7–3.1)
LYMPHOCYTES NFR BLD AUTO: 19 %
MCH RBC QN AUTO: 30.2 PG (ref 26.6–33)
MCHC RBC AUTO-ENTMCNC: 33 G/DL (ref 31.5–35.7)
MCV RBC AUTO: 92 FL (ref 79–97)
MONOCYTES # BLD AUTO: 0.6 X10E3/UL (ref 0.1–0.9)
MONOCYTES NFR BLD AUTO: 10 %
NEUTROPHILS # BLD AUTO: 4.1 X10E3/UL (ref 1.4–7)
NEUTROPHILS NFR BLD AUTO: 70 %
PLATELET # BLD AUTO: 198 X10E3/UL (ref 150–379)
POTASSIUM SERPL-SCNC: 4.1 MMOL/L (ref 3.5–5.2)
PROT SERPL-MCNC: 6.2 G/DL (ref 6–8.5)
RBC # BLD AUTO: 4.3 X10E6/UL (ref 3.77–5.28)
SODIUM SERPL-SCNC: 140 MMOL/L (ref 134–144)
TRIGL SERPL-MCNC: 57 MG/DL (ref 0–149)
TSH SERPL DL<=0.005 MIU/L-ACNC: 1.88 UIU/ML (ref 0.45–4.5)
VLDLC SERPL CALC-MCNC: 11 MG/DL (ref 5–40)
WBC # BLD AUTO: 5.9 X10E3/UL (ref 3.4–10.8)

## 2017-04-17 NOTE — PROGRESS NOTES
Labs look great other than the HG A1 C. It is noted to be 6.3 today. This still falls in the pre diabetic range. This is the first reading that has been obtained in a few years. Will educate and advise her to work on diet for pre diabetes and recheck fasting lab in 3 months.

## 2017-04-20 ENCOUNTER — TELEPHONE (OUTPATIENT)
Dept: GERIATRIC MEDICINE | Age: 82
End: 2017-04-20

## 2017-04-20 DIAGNOSIS — R73.03 PRE-DIABETES: Primary | ICD-10-CM

## 2017-04-24 ENCOUNTER — TELEPHONE (OUTPATIENT)
Dept: GERIATRIC MEDICINE | Age: 82
End: 2017-04-24

## 2017-04-24 NOTE — TELEPHONE ENCOUNTER
Patient was called and informed that the referral has been sent to the [address] [place] [address] for a consult and that the dietician would be calling her to set up a visit.

## 2017-05-18 ENCOUNTER — OFFICE VISIT (OUTPATIENT)
Dept: GERIATRIC MEDICINE | Age: 82
End: 2017-05-18

## 2017-05-18 VITALS
HEIGHT: 61 IN | OXYGEN SATURATION: 96 % | BODY MASS INDEX: 21.14 KG/M2 | DIASTOLIC BLOOD PRESSURE: 70 MMHG | WEIGHT: 112 LBS | TEMPERATURE: 97.6 F | RESPIRATION RATE: 18 BRPM | SYSTOLIC BLOOD PRESSURE: 150 MMHG | HEART RATE: 66 BPM

## 2017-05-18 DIAGNOSIS — I10 ESSENTIAL HYPERTENSION: Primary | ICD-10-CM

## 2017-05-18 DIAGNOSIS — T14.8XXA BRUISE: ICD-10-CM

## 2017-05-18 DIAGNOSIS — N32.81 OAB (OVERACTIVE BLADDER): ICD-10-CM

## 2017-05-18 NOTE — PROGRESS NOTES
Visit Vitals    /70 (BP 1 Location: Right arm, BP Patient Position: Sitting)    Pulse 66    Temp 97.6 °F (36.4 °C) (Oral)    Resp 18    Ht 5' 1\" (1.549 m)    Wt 112 lb (50.8 kg)    SpO2 96%    BMI 21.16 kg/m2     Chief Complaint   Patient presents with    Nasal Injury     bruise to bridge of nose     Pt here in clinic to be seen for a bruise to the bridge of her nose, purple and red in color. Pt states that she is unsure of how she got the bruise. Pt states the bruise only hurts when she is wearing her glasses.

## 2017-05-18 NOTE — PROGRESS NOTES
HISTORY OF PRESENT ILLNESS  Kim Max is a 80 y.o. female. HPI   Patient is here with c/o bruise on the nose. She states that she woke up this morning and went to shower. When she looked in the mirror there was a bruise on the bridge of her nose. She deneis any injury. She does take an aspirin and this could explain the bruise, which may have resulted from the nose rubbing hard against something at night and hence she is not aware. There is no pain in the bruise and there is no break in the skin or bleeding. I have d/w with her the use of a moisturing lotion/ cream or ointment to maintain the integrity of the skin. Other than this I have reassured her that it will heal by itself. Her htn is well controlled. Today her systolic is slightly borderline in the clinic, most likely secondary to the whitecoat htn. She does not have sx of cp, palp, sob. No syncope or dizziness. She has no other issues or concerns. I have reviewed the other medications that she takes and they are appropriate.   Past Medical History:   Diagnosis Date    Allergic rhinitis     Arthritis     Benign hematuria     negative w/u by Dr. Sonya Dixon    Hematuria     Hypertension     Osteoarthritis     Osteopenia     Overactive bladder     Rosacea     Rosacea conjunctivitis     Shingles     Stress incontinence      Family History   Problem Relation Age of Onset    Stroke Mother      x2    Heart Attack Mother 61    Parkinsonism Father     Breast Cancer Sister     Hypertension Sister     Arthritis-osteo Sister     Heart defect Sister      MVP    Stroke Sister     Arthritis-osteo Sister     Hypertension Sister     Diabetes Sister     Breast Cancer Sister     Other Sister      polycythemia vera    Allergic Rhinitis Son     Hypertension Son     Headache Son     Allergic Rhinitis Daughter     Allergic Rhinitis Son      Past Surgical History:   Procedure Laterality Date    ENDOSCOPY, COLON, DIAGNOSTIC  7/2005    ([address])    HX CATARACT REMOVAL  7/2004    bilateral    HX ORTHOPAEDIC      toe surgery for bone spurs    HX IRENE AND BSO  1980    TOTAL HIP ARTHROPLASTY  2000    left    TOTAL HIP ARTHROPLASTY  2001    right     Current Outpatient Prescriptions   Medication Sig Dispense Refill    clotrimazole-betamethasone (LOTRISONE) topical cream Apply twice daily to affected area x 10 days. 15 g 0    carbamide peroxide (DEBROX) 6.5 % otic solution Administer 5 Drops in right ear two (2) times a week. 150 mL 1    simvastatin (ZOCOR) 10 mg tablet Take 1 Tab by mouth nightly. Indications: HYPERLIPIDEMIA 90 Tab 1    calcium carbonate (CALTREX) 600 mg (1,500 mg) tablet Take 1 Tab by mouth two (2) times a day. 60 Tab 5    cholecalciferol (VITAMIN D3) 1,000 unit tablet Take 1 Tab by mouth daily. 30 Tab 11    hydrochlorothiazide (HYDRODIURIL) 12.5 mg tablet Take 1 Tab by mouth daily. 90 Tab 1    acetaminophen (TYLENOL) 325 mg tablet Take  by mouth every four (4) hours as needed for Pain.  peg 400-propylene glycol (SYSTANE) 0.4-0.3 % drop Administer 1 Drop to both eyes four (4) times daily.  amoxicillin (AMOXIL) 500 mg capsule Take 500 mg by mouth. Take 5 caps one hour prior to dental appointments. Indications: prophylactic      atenolol (TENORMIN) 100 mg tablet Take 1 Tab by mouth daily. 90 Tab 3    metronidazole (METROLOTION) 0.75 % lotion Apply  to affected area nightly. (Patient taking differently: Apply  to affected area every morning. Indications: ACNE ROSACEA) 59 mL 1    aspirin 81 mg tablet Take 81 mg by mouth daily.  MULTIVITAMIN PO Take  by mouth daily. Allergies   Allergen Reactions    Latex Rash    Orabase (Benzocaine) [Benzocaine] Anaphylaxis    Adhesive Rash    Codeine Nausea and Vomiting    Darvocet A500 [Propoxyphene N-Acetaminophen] Unknown (comments)    Hydrocodone Unknown (comments)         Review of Systems   Constitutional: Negative for chills, fever and malaise/fatigue.    HENT: Negative for congestion and sore throat. Eyes: Negative for blurred vision and double vision. Respiratory: Negative for cough, sputum production and shortness of breath. Cardiovascular: Negative for chest pain and palpitations. Gastrointestinal: Negative for blood in stool, heartburn, nausea and vomiting. Genitourinary: Negative for dysuria, frequency and urgency. Musculoskeletal: Negative for falls, joint pain and neck pain. Skin: Negative for itching and rash. Bruise on the bridge of the nose   Neurological: Negative for dizziness, focal weakness, seizures, loss of consciousness and headaches. Psychiatric/Behavioral: Negative for depression and suicidal ideas. The patient is not nervous/anxious. Physical Exam   Constitutional: She is oriented to person, place, and time. She appears well-developed and well-nourished. No distress. HENT:   Head: Atraumatic. Eyes: Conjunctivae and EOM are normal. Pupils are equal, round, and reactive to light. Right eye exhibits no discharge. Left eye exhibits no discharge. Neck: Normal range of motion. Neck supple. Cardiovascular: Normal rate, regular rhythm, normal heart sounds and intact distal pulses. Exam reveals no gallop and no friction rub. No murmur heard. Pulmonary/Chest: Effort normal and breath sounds normal. No respiratory distress. She has no wheezes. She has no rales. Abdominal: Soft. Bowel sounds are normal. There is no tenderness. There is no guarding. Musculoskeletal: She exhibits no edema, tenderness or deformity. Neurological: She is alert and oriented to person, place, and time. She has normal reflexes. No cranial nerve deficit. Skin: Skin is warm and dry. No rash noted. She is not diaphoretic. No erythema. Bruise on the nose     Psychiatric: She has a normal mood and affect. Her behavior is normal. Judgment and thought content normal.       ASSESSMENT and PLAN    ICD-10-CM ICD-9-CM    1.  Essential hypertension I10 401.9    2. OAB (overactive bladder) N32.81 596.51    3. Bruise T14.8 924.9      Encounter Diagnoses   Name Primary?  Essential hypertension Yes    OAB (overactive bladder)     Bruise      No orders of the defined types were placed in this encounter. No orders of the defined types were placed in this encounter. No orders of the defined types were placed in this encounter. Jessica Samaniego was seen today for nasal injury. Diagnoses and all orders for this visit:    Essential hypertension    OAB (overactive bladder)    Bruise      Follow-up Disposition: Not on File  current treatment plan is effective, no change in therapy  reviewed diet, exercise and weight control  Reassured her that the bruise is most likely doing to heal by itself and she will call me if any questions or concerns.

## 2017-07-05 RX ORDER — ATENOLOL 100 MG/1
100 TABLET ORAL DAILY
Qty: 90 TAB | Refills: 3 | Status: SHIPPED | OUTPATIENT
Start: 2017-07-05 | End: 2018-03-20 | Stop reason: SDUPTHER

## 2017-07-14 ENCOUNTER — LAB ONLY (OUTPATIENT)
Dept: GERIATRIC MEDICINE | Age: 82
End: 2017-07-14

## 2017-07-14 ENCOUNTER — TELEPHONE (OUTPATIENT)
Dept: GERIATRIC MEDICINE | Age: 82
End: 2017-07-14

## 2017-07-14 DIAGNOSIS — Z01.89 ROUTINE LAB DRAW: Primary | ICD-10-CM

## 2017-07-15 LAB
EST. AVERAGE GLUCOSE BLD GHB EST-MCNC: 126 MG/DL
HBA1C MFR BLD: 6 % (ref 4.8–5.6)

## 2017-07-17 DIAGNOSIS — R73.03 PREDIABETES: ICD-10-CM

## 2017-08-30 DIAGNOSIS — M85.80 OSTEOPENIA DETERMINED BY X-RAY: Primary | ICD-10-CM

## 2017-08-30 RX ORDER — MELATONIN
1000 DAILY
Qty: 30 TAB | Refills: 5 | Status: SHIPPED | OUTPATIENT
Start: 2017-08-30 | End: 2018-04-16 | Stop reason: ALTCHOICE

## 2017-08-30 RX ORDER — CALCIUM CARBONATE 600 MG
600 TABLET ORAL 2 TIMES DAILY
Qty: 60 TAB | Refills: 6 | Status: SHIPPED | OUTPATIENT
Start: 2017-08-30 | End: 2017-10-18

## 2017-08-30 NOTE — TELEPHONE ENCOUNTER
Patient here in clinic requesting medication refill, Send to [facility] pharmacy.

## 2017-10-18 ENCOUNTER — OFFICE VISIT (OUTPATIENT)
Dept: GERIATRIC MEDICINE | Age: 82
End: 2017-10-18

## 2017-10-18 VITALS
RESPIRATION RATE: 18 BRPM | BODY MASS INDEX: 21.11 KG/M2 | HEART RATE: 61 BPM | TEMPERATURE: 97.8 F | SYSTOLIC BLOOD PRESSURE: 148 MMHG | DIASTOLIC BLOOD PRESSURE: 62 MMHG | HEIGHT: 61 IN | WEIGHT: 111.8 LBS | OXYGEN SATURATION: 97 %

## 2017-10-18 DIAGNOSIS — K59.1 FUNCTIONAL DIARRHEA: Primary | ICD-10-CM

## 2017-10-18 DIAGNOSIS — N81.10 FEMALE BLADDER PROLAPSE: ICD-10-CM

## 2017-10-18 DIAGNOSIS — R73.03 PRE-DIABETES: ICD-10-CM

## 2017-10-18 DIAGNOSIS — I10 ESSENTIAL HYPERTENSION: ICD-10-CM

## 2017-10-18 DIAGNOSIS — N32.81 OAB (OVERACTIVE BLADDER): ICD-10-CM

## 2017-10-18 DIAGNOSIS — F41.8 ANTICIPATORY ANXIETY: ICD-10-CM

## 2017-10-18 DIAGNOSIS — L02.92 BOIL: ICD-10-CM

## 2017-10-18 DIAGNOSIS — E55.9 VITAMIN D DEFICIENCY: ICD-10-CM

## 2017-10-18 NOTE — MR AVS SNAPSHOT
Visit Information Date & Time Provider Department Dept. Phone Encounter #  
 10/18/2017 11:00 AM Tracy Parker [facility] [phone] [id number] Follow-up Instructions Return in about 5 days (around 10/23/2017) for to review labs and discuss progression of your stools. Follow-up and Disposition History Upcoming Health Maintenance Date Due  
 MEDICARE YEARLY EXAM 3/25/2018 GLAUCOMA SCREENING Q2Y 1/23/2019 DTaP/Tdap/Td series (2 - Td) 10/18/2027 Allergies as of 10/18/2017  Review Complete On: 10/18/2017 By: Nicola Quintanilla Severity Noted Reaction Type Reactions Latex  05/17/2016    Rash Orabase (Benzocaine) [Benzocaine] High 05/17/2016    Anaphylaxis Adhesive  05/17/2016    Rash Codeine  09/14/2011    Nausea and Vomiting Robertha Hedger [Propoxyphene N-acetaminophen]  09/14/2011    Unknown (comments) Hydrocodone  09/14/2011    Unknown (comments) Current Immunizations  Reviewed on 9/30/2011 Name Date Influenza Vaccine 10/1/2016, 10/1/2015 Influenza Vaccine Whole 9/29/2011 Pneumococcal Conjugate (PCV-13) 7/30/2015 Pneumococcal Polysaccharide (PPSV-23) 9/1/2008 Pneumococcal Vaccine (Unspecified Type) 10/1/1998 Td 9/30/2009 ZZZ-RETIRED (DO NOT USE) Pneumococcal Vaccine (Unspecified Type) 9/1/2008, 10/1/1998 Zoster Vaccine, Live 5/11/2012 Not reviewed this visit You Were Diagnosed With   
  
 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 
ICD-9-CM: 680.9 Essential hypertension     ICD-10-CM: I10 
ICD-9-CM: 401.9 Vitamin D deficiency     ICD-10-CM: E55.9 ICD-9-CM: 268.9 Pre-diabetes     ICD-10-CM: R73.03 
ICD-9-CM: 790.29   
 OAB (overactive bladder)     ICD-10-CM: J68.96 ICD-9-CM: 596.51 Female bladder prolapse     ICD-10-CM: N81.10 ICD-9-CM: 618.01 Vitals BP Pulse Temp Resp Height(growth percentile) Weight(growth percentile) 148/62 (BP 1 Location: Right arm, BP Patient Position: Sitting) 61 97.8 °F (36.6 °C) (Oral) 18 5' 1\" (1.549 m) 111 lb 12.8 oz (50.7 kg) SpO2 BMI OB Status Smoking Status 97% 21.12 kg/m2 Hysterectomy Never Smoker Vitals History BMI and BSA Data Body Mass Index Body Surface Area  
 21.12 kg/m 2 1.48 m 2 [phone] Your Updated Medication List  
  
   
This list is accurate as of: 10/18/17  2:40 PM.  Always use your most recent med list.  
  
  
  
  
 acetaminophen 325 mg tablet Commonly known as:  TYLENOL Take  by mouth every four (4) hours as needed for Pain.  
  
 amoxicillin 500 mg capsule Commonly known as:  AMOXIL Take 500 mg by mouth. Take 5 caps one hour prior to dental appointments. Indications: prophylactic  
  
 aspirin 81 mg tablet Take 81 mg by mouth daily. atenolol 100 mg tablet Commonly known as:  TENORMIN Take 1 Tab by mouth daily. carbamide peroxide 6.5 % otic solution Commonly known as:  Tej Jarquin Administer 5 Drops in right ear two (2) times a week. cholecalciferol 1,000 unit tablet Commonly known as:  VITAMIN D3 Take 1 Tab by mouth daily. Indications: OSTEOPOROSIS  
  
 clotrimazole-betamethasone topical cream  
Commonly known as:  Jessica John Apply twice daily to affected area x 10 days. hydroCHLOROthiazide 12.5 mg tablet Commonly known as:  HYDRODIURIL Take 1 Tab by mouth daily. metroNIDAZOLE 0.75 % lotion Commonly known as:  Kika Knock Apply  to affected area nightly. peg 400-propylene glycol 0.4-0.3 % Drop Commonly known as:  SYSTANE Administer 1 Drop to both eyes four (4) times daily. simvastatin 10 mg tablet Commonly known as:  ZOCOR  
 Take 1 Tab by mouth nightly. Indications: HYPERLIPIDEMIA We Performed the Following CBC WITH AUTOMATED DIFF [62239 CPT(R)] HEMOGLOBIN A1C WITH EAG [36296 CPT(R)] LIPID PANEL [03120 CPT(R)] METABOLIC PANEL, COMPREHENSIVE [23979 CPT(R)] PREALBUMIN [45635 CPT(R)] T4, FREE [id number] CPT(R)] TSH 3RD GENERATION [77345 CPT(R)] URINALYSIS W/ RFLX MICROSCOPIC [67799 CPT(R)] VITAMIN D, 25 HYDROXY [id number] CPT(R)] Follow-up Instructions Return in about 5 days (around 10/23/2017) for to review labs and discuss progression of your stools. Patient Instructions Diarrhea: Care Instructions Your Care Instructions Diarrhea is loose, watery stools (bowel movements). The exact cause is often hard to find. Sometimes diarrhea is your body's way of getting rid of what caused an upset stomach. Viruses, food poisoning, and many medicines can cause diarrhea. Some people get diarrhea in response to emotional stress, anxiety, or certain foods. Almost everyone has diarrhea now and then. It usually isn't serious, and your stools will return to normal soon. The important thing to do is replace the fluids you have lost, so you can prevent dehydration. The doctor has checked you carefully, but problems can develop later. If you notice any problems or new symptoms, get medical treatment right away. Follow-up care is a key part of your treatment and safety. Be sure to make and go to all appointments, and call your doctor if you are having problems. It's also a good idea to know your test results and keep a list of the medicines you take. How can you care for yourself at home? · Watch for signs of dehydration, which means your body has lost too much water. Dehydration is a serious condition and should be treated right away. Signs of dehydration are: 
¨ Increasing thirst and dry eyes and mouth. ¨ Feeling faint or lightheaded. ¨ Darker urine, and a smaller amount of urine than normal. 
· To prevent dehydration, drink plenty of fluids, enough so that your urine is light yellow or clear like water. Choose water and other caffeine-free clear liquids until you feel better. If you have kidney, heart, or liver disease and have to limit fluids, talk with your doctor before you increase the amount of fluids you drink. · Begin eating small amounts of mild foods the next day, if you feel like it. ¨ Try yogurt that has live cultures of Lactobacillus. (Check the label.) ¨ Avoid spicy foods, fruits, alcohol, and caffeine until 48 hours after all symptoms are gone. ¨ Avoid chewing gum that contains sorbitol. ¨ Avoid dairy products (except for yogurt with Lactobacillus) while you have diarrhea and for 3 days after symptoms are gone. · The doctor may recommend that you take over-the-counter medicine, such as loperamide (Imodium), if you still have diarrhea after 6 hours. Read and follow all instructions on the label. Do not use this medicine if you have bloody diarrhea, a high fever, or other signs of serious illness. Call your doctor if you think you are having a problem with your medicine. When should you call for help? Call 911 anytime you think you may need emergency care. For example, call if: 
· You passed out (lost consciousness). · Your stools are maroon or very bloody. Call your doctor now or seek immediate medical care if: 
· You are dizzy or lightheaded, or you feel like you may faint. · Your stools are black and look like tar, or they have streaks of blood. · You have new or worse belly pain. · You have symptoms of dehydration, such as: ¨ Dry eyes and a dry mouth. ¨ Passing only a little dark urine. ¨ Feeling thirstier than usual. 
· You have a new or higher fever. Watch closely for changes in your health, and be sure to contact your doctor if: 
· Your diarrhea is getting worse. · You see pus in the diarrhea. · You are not getting better after 2 days (48 hours). Where can you learn more? Go to http://andriy-francisco.info/. Enter G942 in the search box to learn more about \"Diarrhea: Care Instructions. \" Current as of: March 20, 2017 Content Version: 11.3 © 7578-9254 [employer]. Care instructions adapted under license by [employer] (which disclaims liability or warranty for this information). If you have questions about a medical condition or this instruction, always ask your healthcare professional. [address] any warranty or liability for your use of this information. Loperamide (By mouth) Loperamide (carin-PER-a-mide) Treats diarrhea and decreases the amount of drainage in patients who have ostomies. Brand Name(s): Anti-Diarrheal, Diamode, [facility] Anti-Diarrheal, [facility] Loperamide Hydrochloride, [facility] Anti-Diarrheal, [facility] Loperamide Hydrochloride, [facility] Anti-Diarrheal, [facility] Loperamide HCl, Imodium A-D, Kaodene A-D, Leader Anti-Diarrheal, QC Anti-Diarrheal, [facility] Anti-Diarrheal, [facility] Anti-Diarrheal Loperamide Hydrochloride, [facility] Loperamide Hydrochloride There may be other brand names for this medicine. When This Medicine Should Not Be Used: You should not use this medicine if you have ever had an allergic reaction to loperamide or if you have severe colitis, diarrhea caused by antibiotics, or dysentery. Do not give to children under 3years of age. How to Use This Medicine:  
Liquid Filled Capsule, Tablet, Capsule, Liquid · Your doctor will tell you how much medicine to take and how often. · Drink plenty of water while using this medicine. · May be taken on an empty or full stomach. If a dose is missed: · Take the missed dose as soon as possible. · If it is almost time for your next regular dose, wait until then to use your medicine and skip the missed dose. · You should not use two doses at the same time. How to Store and Dispose of This Medicine: · Store at room temperature, away from heat and moisture. Do not freeze the oral liquid. · Keep all medicine out of the reach of children. Drugs and Foods to Avoid: Ask your doctor or pharmacist before using any other medicine, including over-the-counter medicines, vitamins, and herbal products. Warnings While Using This Medicine: · Do not give loperamide to children or an elderly person unless your doctor tells you to. · Make sure your doctor knows if you are using any antibiotic drug. · Loperamide solution (liquid) contains alcohol. · If you are still having diarrhea after using this medicine for 2 days, call your doctor. Possible Side Effects While Using This Medicine:  
Call your doctor right away if you notice any of these side effects: 
· Constipation with nausea and vomiting · Bloating If you notice these less serious side effects, talk with your doctor: · Stomach pain, cramps · Loss of appetite · Dry mouth · Changes in vision, such as trouble focusing · Drowsiness or dizziness If you notice other side effects that you think are caused by this medicine, tell your doctor. Call your doctor for medical advice about side effects. You may report side effects to FDA at 4-668-FDA-2484 © 2017 [address] Information is for End User's use only and may not be sold, redistributed or otherwise used for commercial purposes. The above information is an  only. It is not intended as medical advice for individual conditions or treatments. Talk to your doctor, nurse or pharmacist before following any medical regimen to see if it is safe and effective for you. Introducing [facility]! [facility] introduces [email or web address] patient portal. Now you can access parts of your medical record, email your doctor's office, and request medication refills online. 1. In your internet browser, go to [email or web address] 2. Click on the First Time User? Click Here link in the Sign In box. You will see the New Member Sign Up page. 3. Enter your AxisMobile Access Code exactly as it appears below. You will not need to use this code after youve completed the sign-up process. If you do not sign up before the expiration date, you must request a new code. · AxisMobile Access Code: [email or web address] Expires: 1/16/2018 11:03 AM 
 
4. Enter the last four digits of your Social Security Number (xxxx) and Date of Birth (mm/dd/yyyy) as indicated and click Submit. You will be taken to the next sign-up page. 5. Create a In Flowt ID. This will be your AxisMobile login ID and cannot be changed, so think of one that is secure and easy to remember. 6. Create a AxisMobile password. You can change your password at any time. 7. Enter your Password Reset Question and Answer. This can be used at a later time if you forget your password. 8. Enter your e-mail address. You will receive e-mail notification when new information is available in [address] 9. Click Sign Up. You can now view and download portions of your medical record. 10. Click the Download Summary menu link to download a portable copy of your medical information. If you have questions, please visit the Frequently Asked Questions section of the AxisMobile website. Remember, AxisMobile is NOT to be used for urgent needs. For medical emergencies, dial 911. Now available from your iPhone and Android! Please provide this summary of care documentation to your next provider. Your primary care clinician is listed as Kianna Galloway. If you have any questions after today's visit, please call [phone].

## 2017-10-18 NOTE — PROGRESS NOTES
Visit Vitals    /62 (BP 1 Location: Right arm, BP Patient Position: Sitting)    Pulse 61    Temp 97.8 °F (36.6 °C) (Oral)    Resp 18    Ht 5' 1\" (1.549 m)    Wt 111 lb 12.8 oz (50.7 kg)    SpO2 97%    BMI 21.12 kg/m2      Chief Complaint   Patient presents with    Nodule     Patient reports having bump on her left hip. The bump is red with some irritation.  Diarrhea     Patient reports that when she started taking calcium she started with loose stools. Thats why she feels depressed sometimes, she cant go out to places.

## 2017-10-18 NOTE — PATIENT INSTRUCTIONS
Diarrhea: Care Instructions  Your Care Instructions    Diarrhea is loose, watery stools (bowel movements). The exact cause is often hard to find. Sometimes diarrhea is your body's way of getting rid of what caused an upset stomach. Viruses, food poisoning, and many medicines can cause diarrhea. Some people get diarrhea in response to emotional stress, anxiety, or certain foods. Almost everyone has diarrhea now and then. It usually isn't serious, and your stools will return to normal soon. The important thing to do is replace the fluids you have lost, so you can prevent dehydration. The doctor has checked you carefully, but problems can develop later. If you notice any problems or new symptoms, get medical treatment right away. Follow-up care is a key part of your treatment and safety. Be sure to make and go to all appointments, and call your doctor if you are having problems. It's also a good idea to know your test results and keep a list of the medicines you take. How can you care for yourself at home? · Watch for signs of dehydration, which means your body has lost too much water. Dehydration is a serious condition and should be treated right away. Signs of dehydration are:  ¨ Increasing thirst and dry eyes and mouth. ¨ Feeling faint or lightheaded. ¨ Darker urine, and a smaller amount of urine than normal.  · To prevent dehydration, drink plenty of fluids, enough so that your urine is light yellow or clear like water. Choose water and other caffeine-free clear liquids until you feel better. If you have kidney, heart, or liver disease and have to limit fluids, talk with your doctor before you increase the amount of fluids you drink. · Begin eating small amounts of mild foods the next day, if you feel like it. ¨ Try yogurt that has live cultures of Lactobacillus. (Check the label.)  ¨ Avoid spicy foods, fruits, alcohol, and caffeine until 48 hours after all symptoms are gone.   ¨ Avoid chewing gum that contains sorbitol. ¨ Avoid dairy products (except for yogurt with Lactobacillus) while you have diarrhea and for 3 days after symptoms are gone. · The doctor may recommend that you take over-the-counter medicine, such as loperamide (Imodium), if you still have diarrhea after 6 hours. Read and follow all instructions on the label. Do not use this medicine if you have bloody diarrhea, a high fever, or other signs of serious illness. Call your doctor if you think you are having a problem with your medicine. When should you call for help? Call 911 anytime you think you may need emergency care. For example, call if:  · You passed out (lost consciousness). · Your stools are maroon or very bloody. Call your doctor now or seek immediate medical care if:  · You are dizzy or lightheaded, or you feel like you may faint. · Your stools are black and look like tar, or they have streaks of blood. · You have new or worse belly pain. · You have symptoms of dehydration, such as:  ¨ Dry eyes and a dry mouth. ¨ Passing only a little dark urine. ¨ Feeling thirstier than usual.  · You have a new or higher fever. Watch closely for changes in your health, and be sure to contact your doctor if:  · Your diarrhea is getting worse. · You see pus in the diarrhea. · You are not getting better after 2 days (48 hours). Where can you learn more? Go to http://andriy-francisco.info/. Enter W787 in the search box to learn more about \"Diarrhea: Care Instructions. \"  Current as of: March 20, 2017  Content Version: 11.3  © 6106-2765 [employer]. Care instructions adapted under license by [employer] (which disclaims liability or warranty for this information). If you have questions about a medical condition or this instruction, always ask your healthcare professional. [place] [address] any warranty or liability for your use of this information.     Loperamide (By mouth)   Loperamide (carin-PER-a-mide)  Treats diarrhea and decreases the amount of drainage in patients who have ostomies. Brand Name(s): Anti-Diarrheal, Diamode, [facility] Anti-Diarr[facility]al, [facility] Loperamide Hydrochloride, [facility] Anti-Diarrheal, [facility] Loperamide Hydrochloride, [facility] Anti-Diarrheal, [facility] Loperamide HCl, Imodium A-D, Kaodene A-D, [facility] Anti-Diarrheal, QC Anti-Diarrheal, [facility] Anti-Diarrheal, [facility] Anti-Diarrheal Loperamide Hydrochloride, [facility] Loperamide Hydrochloride   There may be other brand names for this medicine. When This Medicine Should Not Be Used: You should not use this medicine if you have ever had an allergic reaction to loperamide or if you have severe colitis, diarrhea caused by antibiotics, or dysentery. Do not give to children under 3years of age. How to Use This Medicine:   Liquid Filled Capsule, Tablet, Capsule, Liquid  · Your doctor will tell you how much medicine to take and how often. · Drink plenty of water while using this medicine. · May be taken on an empty or full stomach. If a dose is missed:   · Take the missed dose as soon as possible. · If it is almost time for your next regular dose, wait until then to use your medicine and skip the missed dose. · You should not use two doses at the same time. How to Store and Dispose of This Medicine:   · Store at room temperature, away from heat and moisture. Do not freeze the oral liquid. · Keep all medicine out of the reach of children. Drugs and Foods to Avoid:      Ask your doctor or pharmacist before using any other medicine, including over-the-counter medicines, vitamins, and herbal products. Warnings While Using This Medicine:   · Do not give loperamide to children or an elderly person unless your doctor tells you to. · Make sure your doctor knows if you are using any antibiotic drug. · Loperamide solution (liquid) contains alcohol.   · If you are still having diarrhea after using this medicine for 2 days, call your doctor. Possible Side Effects While Using This Medicine:   Call your doctor right away if you notice any of these side effects:  · Constipation with nausea and vomiting  · Bloating  If you notice these less serious side effects, talk with your doctor:   · Stomach pain, cramps  · Loss of appetite  · Dry mouth  · Changes in vision, such as trouble focusing  · Drowsiness or dizziness  If you notice other side effects that you think are caused by this medicine, tell your doctor. Call your doctor for medical advice about side effects. You may report side effects to FDA at 5-452-FDA-7576  © 2017 [facility] Information is for End User's use only and may not be sold, redistributed or otherwise used for commercial purposes. The above information is an  only. It is not intended as medical advice for individual conditions or treatments. Talk to your doctor, nurse or pharmacist before following any medical regimen to see if it is safe and effective for you.

## 2017-10-18 NOTE — PROGRESS NOTES
HISTORY OF PRESENT ILLNESS  Jude Barrera is a 80 y.o. female.   HPI    ROS    Physical Exam    ASSESSMENT and PLAN  {ASSESSMENT/PLAN:64538}

## 2017-10-18 NOTE — PROGRESS NOTES
Subjective: Joseph Aponte is a 80 y.o. female who presents for the following complaints today    Chief Complaint   Patient presents with    Nodule     Patient reports having bump on her left hip. The bump is red with some irritation.  Diarrhea     Patient reports that when she started taking calcium she started with loose stools. Thats why she feels depressed sometimes, she cant go out to places. Current Outpatient Prescriptions on File Prior to Visit   Medication Sig Dispense Refill    cholecalciferol (VITAMIN D3) 1,000 unit tablet Take 1 Tab by mouth daily. Indications: OSTEOPOROSIS 30 Tab 5    atenolol (TENORMIN) 100 mg tablet Take 1 Tab by mouth daily. 90 Tab 3    clotrimazole-betamethasone (LOTRISONE) topical cream Apply twice daily to affected area x 10 days. 15 g 0    carbamide peroxide (DEBROX) 6.5 % otic solution Administer 5 Drops in right ear two (2) times a week. 150 mL 1    simvastatin (ZOCOR) 10 mg tablet Take 1 Tab by mouth nightly. Indications: HYPERLIPIDEMIA 90 Tab 1    hydrochlorothiazide (HYDRODIURIL) 12.5 mg tablet Take 1 Tab by mouth daily. 90 Tab 1    acetaminophen (TYLENOL) 325 mg tablet Take  by mouth every four (4) hours as needed for Pain.  peg 400-propylene glycol (SYSTANE) 0.4-0.3 % drop Administer 1 Drop to both eyes four (4) times daily.  amoxicillin (AMOXIL) 500 mg capsule Take 500 mg by mouth. Take 5 caps one hour prior to dental appointments. Indications: prophylactic      metronidazole (METROLOTION) 0.75 % lotion Apply  to affected area nightly. (Patient taking differently: Apply  to affected area every morning. Indications: ACNE ROSACEA) 59 mL 1    aspirin 81 mg tablet Take 81 mg by mouth daily. No current facility-administered medications on file prior to visit.         Allergies   Allergen Reactions    Latex Rash    Orabase (Benzocaine) [Benzocaine] Anaphylaxis    Adhesive Rash    Codeine Nausea and Vomiting    Darvocet A500 [Propoxyphene N-Acetaminophen] Unknown (comments)    Hydrocodone Unknown (comments)      Past Medical History:   Diagnosis Date    Allergic rhinitis     Arthritis     Benign hematuria     negative w/u by Dr. Alejo Moreno    Hematuria     Hypertension     Osteoarthritis     Osteopenia     Overactive bladder     Rosacea     Rosacea conjunctivitis     Shingles     Stress incontinence       Past Surgical History:   Procedure Laterality Date    ENDOSCOPY, COLON, DIAGNOSTIC  7/2005    ([address])    HX CATARACT REMOVAL  7/2004    bilateral    HX ORTHOPAEDIC      toe surgery for bone spurs    HX IRENE AND BSO  1980    TOTAL HIP ARTHROPLASTY  2000    left    TOTAL HIP ARTHROPLASTY  2001    right      Social History   Substance Use Topics    Smoking status: Never Smoker    Smokeless tobacco: Never Used    Alcohol use No      Family History   Problem Relation Age of Onset    Stroke Mother      x2    Heart Attack Mother 61    Parkinsonism Father     Breast Cancer Sister     Hypertension Sister     Arthritis-osteo Sister     Heart defect Sister      MVP    Stroke Sister     Arthritis-osteo Sister     Hypertension Sister     Diabetes Sister     Breast Cancer Sister     Other Sister      polycythemia vera    Allergic Rhinitis Son     Hypertension Son     Headache Son     Allergic Rhinitis Daughter     Allergic Rhinitis Son         Lab Results   Component Value Date/Time    WBC 5.9 04/13/2017 10:35 AM    HGB 13.0 04/13/2017 10:35 AM    HCT 39.4 04/13/2017 10:35 AM    PLATELET 940 89/63/1628 10:35 AM       Lab Results   Component Value Date/Time    ALT (SGPT) 16 04/13/2017 10:35 AM    AST (SGOT) 35 04/13/2017 10:35 AM    Creatinine 0.73 04/13/2017 10:35 AM    BUN 18 04/13/2017 10:35 AM    Sodium 140 04/13/2017 10:35 AM    Potassium 4.1 04/13/2017 10:35 AM       Lab Results   Component Value Date/Time    Cholesterol, total 144 04/13/2017 10:35 AM    HDL Cholesterol 78 04/13/2017 10:35 AM    LDL, calculated 55 04/13/2017 10:35 AM    Triglyceride 57 04/13/2017 10:35 AM    TSH 1.880 04/13/2017 10:35 AM    Hemoglobin A1c 6.0 07/14/2017 10:27 AM       Objective:     Vitals:    10/18/17 1111   BP: 148/62   Pulse: 61   Resp: 18   Temp: 97.8 °F (36.6 °C)   TempSrc: Oral   SpO2: 97%   Weight: 111 lb 12.8 oz (50.7 kg)   Height: 5' 1\" (1.549 m)       Review of Systems   Constitutional: Positive for malaise/fatigue. Negative for chills, diaphoresis, fever and weight loss. HENT: Negative for congestion, ear discharge, ear pain, sore throat and tinnitus. Eyes: Negative for blurred vision, photophobia, pain, discharge and redness. Respiratory: Negative for cough, sputum production, shortness of breath, wheezing and stridor. Cardiovascular: Negative for chest pain, palpitations, orthopnea, leg swelling and PND. Gastrointestinal: Positive for diarrhea. Negative for abdominal pain, blood in stool, constipation, heartburn, nausea and vomiting. Genitourinary: Positive for frequency and urgency. Negative for dysuria, flank pain and hematuria. Musculoskeletal: Negative for back pain, falls, joint pain and neck pain. Skin: Negative for itching and rash. Bump on her left side    Neurological: Positive for weakness. Negative for dizziness, tingling, sensory change, focal weakness, seizures, loss of consciousness and headaches. Psychiatric/Behavioral: Positive for depression. Negative for memory loss and substance abuse. The patient is nervous/anxious. The patient does not have insomnia. Physical Exam   Constitutional: She is oriented to person, place, and time. She appears well-developed and well-nourished. She is active and cooperative. Non-toxic appearance. She does not have a sickly appearance. She appears ill. No distress. Thin, fragile in appearance  elderly female, mildly anxious. In no acute distress. HENT:   Head: Normocephalic and atraumatic.    Right Ear: Hearing, tympanic membrane and external ear normal.   Left Ear: Hearing, tympanic membrane and external ear normal.   Nose: Nose normal.   Mouth/Throat: Oropharynx is clear and moist and mucous membranes are normal. No oropharyngeal exudate. Mild wax accumulation in both ears but TM is easily visualized and patent. Eyes: Conjunctivae, EOM and lids are normal. Pupils are unequal.   Right pupil is reactive 3.5 mm in size   Left pupil is reactive 4.5 mm size. She states she has never been told this before. Both are equally reactive. Neck: Trachea normal, normal range of motion and full passive range of motion without pain. Neck supple. No hepatojugular reflux and no JVD present. No tracheal tenderness present. Carotid bruit is not present. No tracheal deviation, no edema and no erythema present. No thyroid mass and no thyromegaly present. Cardiovascular: Normal rate, regular rhythm, S1 normal, S2 normal, normal heart sounds, intact distal pulses and normal pulses. No extrasystoles are present. PMI is not displaced. Exam reveals no gallop and no friction rub. No murmur heard. Pulmonary/Chest: Effort normal and breath sounds normal. No stridor. Abdominal: Soft. Normal appearance and bowel sounds are normal. She exhibits no distension, no abdominal bruit, no ascites and no mass. There is no hepatosplenomegaly. There is tenderness in the epigastric area and left upper quadrant. There is no rigidity, no rebound, no guarding, no CVA tenderness, no tenderness at McBurney's point and negative Cochran's sign. No hernia. Musculoskeletal: Normal range of motion. She exhibits no edema or tenderness. No joint erythema or tenderness noted. Gait is normal without extremity weakness. Peripheral pulses are intact, sensation is normal and capillary refill is less than 2 sec.     Lymphadenopathy:        Head (right side): No submental, no submandibular, no tonsillar, no preauricular, no posterior auricular and no occipital adenopathy present. Head (left side): No submental, no submandibular, no tonsillar, no preauricular, no posterior auricular and no occipital adenopathy present. She has no cervical adenopathy. She has no axillary adenopathy. Neurological: She is alert and oriented to person, place, and time. She has normal strength and normal reflexes. She is not disoriented. No cranial nerve deficit or sensory deficit. She displays a negative Romberg sign. Coordination and gait normal.   Reflex Scores:       Tricep reflexes are 2+ on the right side and 2+ on the left side. Bicep reflexes are 2+ on the right side and 2+ on the left side. Brachioradialis reflexes are 2+ on the right side and 2+ on the left side. Patellar reflexes are 2+ on the right side and 2+ on the left side. Achilles reflexes are 2+ on the right side and 2+ on the left side. Skin: Skin is warm, dry and intact. Lesion noted. No bruising, no laceration and no rash noted. She is not diaphoretic. No cyanosis. No pallor. Nails show no clubbing. Psychiatric: Judgment and thought content normal. Her mood appears anxious. Her affect is labile. Her speech is rapid and/or pressured. She is hyperactive and withdrawn. Cognition and memory are impaired. She exhibits a depressed mood. Not her usual self today as she is rambling and getting confused with her thoughts. She is unorganized with her complaints. Nursing note and vitals reviewed. Assessment/ Plan:   During today's visit the following medications or treatments were requested to be discontinued by the provider :   Medications Discontinued During This Encounter   Medication Reason    MULTIVITAMIN PO Not A Current Medication    calcium carbonate (CALTREX) 600 mg calcium (1,500 mg) tablet Other     Diagnoses and all orders for this visit:    1.  Functional diarrhea - I have advised her to stop the Calcium even though I do not believe this is the cause as her follow up has been minimal and this is the first time any provider is hearing this. I have asked her to use otc imodium up to 4 pills daily for \"episodes\" of diarrhea. The diarrhea has caused her to be more secluded and isolated. -     VITAMIN D, 25 HYDROXY  -     CBC WITH AUTOMATED DIFF  -     METABOLIC PANEL, COMPREHENSIVE  -     TSH 3RD GENERATION  -     T4, FREE  -     PREALBUMIN  -     LIPID PANEL  -     HEMOGLOBIN A1C WITH EAG  -     URINALYSIS W/ RFLX MICROSCOPIC    2. Anticipatory anxiety  - She is having some signs of depression and anxiety concerning her bowels and some issues in her apartment with a resident that is in need of further care. 3. Boil - thought at first it was a bug bite. It now has some hardness to it and some fluid inside of it and pt would like to let it heal on her own as I advised offered her the option of me opening it to let it drain. She passed on that for now. -     CBC WITH AUTOMATED DIFF    4. Essential hypertension  -     CBC WITH AUTOMATED DIFF  -     METABOLIC PANEL, COMPREHENSIVE  -     PREALBUMIN    5. Vitamin D deficiency  -     VITAMIN D, 25 HYDROXY    6. Pre-diabetes  -     METABOLIC PANEL, COMPREHENSIVE  -     HEMOGLOBIN A1C WITH EAG    7. OAB (overactive bladder)    8. Female bladder prolapse - She is unable to stand or sit for any period of time without her bladder falling out of her vagina. She states she goes to the bathroom and pushes it back inside of her and keeps her legs closed a lot. She also lays down to keep it inside as well. I have discussed with her there are options with treatment so that she does not need to live this way. I will refer her to UroGynocology for further evaluation and treatment if she will go. Please make sure you are keeping track of your stools so we can discuss the consistency, the frequency, and urgency. I have discussed the diagnosis with the patient and the intended plan as seen in the above orders.       The patient has received an after-visit summary and questions were answered concerning future plans. I have discussed medication side effects and warnings with the patient as well. Follow-up Disposition: Return in about 5 days (around 10/23/2017) to review labs and discuss how your stools. Next steps will be after labs come back -   1. ST Consults for memory assessment  2. Uro-gynocology referral for bladder prolapse  3. CT abdomen to evaluate chronic diarrhea. 4. Last Colon was completed in 2005, may refer to GI for further work up.    5. Consider an SSRI for anxiety

## 2017-10-19 LAB
25(OH)D3+25(OH)D2 SERPL-MCNC: 46.9 NG/ML (ref 30–100)
ALBUMIN SERPL-MCNC: 3.9 G/DL (ref 3.5–4.7)
ALBUMIN/GLOB SERPL: 1.6 {RATIO} (ref 1.2–2.2)
ALP SERPL-CCNC: 78 IU/L (ref 39–117)
ALT SERPL-CCNC: 27 IU/L (ref 0–32)
APPEARANCE UR: CLEAR
AST SERPL-CCNC: 44 IU/L (ref 0–40)
BASOPHILS # BLD AUTO: 0 X10E3/UL (ref 0–0.2)
BASOPHILS NFR BLD AUTO: 0 %
BILIRUB SERPL-MCNC: 0.5 MG/DL (ref 0–1.2)
BILIRUB UR QL STRIP: NEGATIVE
BUN SERPL-MCNC: 19 MG/DL (ref 8–27)
BUN/CREAT SERPL: 27 (ref 12–28)
CALCIUM SERPL-MCNC: 9.4 MG/DL (ref 8.7–10.3)
CHLORIDE SERPL-SCNC: 97 MMOL/L (ref 96–106)
CHOLEST SERPL-MCNC: 161 MG/DL (ref 100–199)
CO2 SERPL-SCNC: 28 MMOL/L (ref 18–29)
COLOR UR: YELLOW
CREAT SERPL-MCNC: 0.71 MG/DL (ref 0.57–1)
EOSINOPHIL # BLD AUTO: 0.1 X10E3/UL (ref 0–0.4)
EOSINOPHIL NFR BLD AUTO: 1 %
ERYTHROCYTE [DISTWIDTH] IN BLOOD BY AUTOMATED COUNT: 13.7 % (ref 12.3–15.4)
EST. AVERAGE GLUCOSE BLD GHB EST-MCNC: 123 MG/DL
GFR SERPLBLD CREATININE-BSD FMLA CKD-EPI: 77 ML/MIN/1.73
GFR SERPLBLD CREATININE-BSD FMLA CKD-EPI: 89 ML/MIN/1.73
GLOBULIN SER CALC-MCNC: 2.4 G/DL (ref 1.5–4.5)
GLUCOSE SERPL-MCNC: 102 MG/DL (ref 65–99)
GLUCOSE UR QL: NEGATIVE
HBA1C MFR BLD: 5.9 % (ref 4.8–5.6)
HCT VFR BLD AUTO: 40.4 % (ref 34–46.6)
HDLC SERPL-MCNC: 71 MG/DL
HGB BLD-MCNC: 13.5 G/DL (ref 11.1–15.9)
HGB UR QL STRIP: NEGATIVE
IMM GRANULOCYTES # BLD: 0 X10E3/UL (ref 0–0.1)
IMM GRANULOCYTES NFR BLD: 0 %
KETONES UR QL STRIP: NEGATIVE
LDLC SERPL CALC-MCNC: 69 MG/DL (ref 0–99)
LEUKOCYTE ESTERASE UR QL STRIP: NEGATIVE
LYMPHOCYTES # BLD AUTO: 0.9 X10E3/UL (ref 0.7–3.1)
LYMPHOCYTES NFR BLD AUTO: 15 %
MCH RBC QN AUTO: 29.7 PG (ref 26.6–33)
MCHC RBC AUTO-ENTMCNC: 33.4 G/DL (ref 31.5–35.7)
MCV RBC AUTO: 89 FL (ref 79–97)
MICRO URNS: NORMAL
MONOCYTES # BLD AUTO: 0.7 X10E3/UL (ref 0.1–0.9)
MONOCYTES NFR BLD AUTO: 11 %
NEUTROPHILS # BLD AUTO: 4.4 X10E3/UL (ref 1.4–7)
NEUTROPHILS NFR BLD AUTO: 73 %
NITRITE UR QL STRIP: NEGATIVE
PH UR STRIP: 7 [PH] (ref 5–7.5)
PLATELET # BLD AUTO: 213 X10E3/UL (ref 150–379)
POTASSIUM SERPL-SCNC: 4.4 MMOL/L (ref 3.5–5.2)
PREALB SERPL-MCNC: 21 MG/DL (ref 9–32)
PROT SERPL-MCNC: 6.3 G/DL (ref 6–8.5)
PROT UR QL STRIP: NEGATIVE
RBC # BLD AUTO: 4.55 X10E6/UL (ref 3.77–5.28)
SODIUM SERPL-SCNC: 140 MMOL/L (ref 134–144)
SP GR UR: 1.01 (ref 1–1.03)
SPECIMEN STATUS REPORT, ROLRST: NORMAL
T4 FREE SERPL-MCNC: 1.12 NG/DL (ref 0.82–1.77)
TRIGL SERPL-MCNC: 106 MG/DL (ref 0–149)
TSH SERPL DL<=0.005 MIU/L-ACNC: 2.24 UIU/ML (ref 0.45–4.5)
UROBILINOGEN UR STRIP-MCNC: 0.2 MG/DL (ref 0.2–1)
VLDLC SERPL CALC-MCNC: 21 MG/DL (ref 5–40)
WBC # BLD AUTO: 6 X10E3/UL (ref 3.4–10.8)

## 2017-10-20 ENCOUNTER — TELEPHONE (OUTPATIENT)
Dept: GERIATRIC MEDICINE | Age: 82
End: 2017-10-20

## 2017-10-20 NOTE — TELEPHONE ENCOUNTER
I called the patient reminding them of their appointment on October 23, 2017. Pt stated that they will be on time.

## 2017-10-20 NOTE — TELEPHONE ENCOUNTER
Pt called this afternoon in regards to feeling a little bit better. Pt wanted to know should she continue to take anti diarrhea if she is feeling better. Pt would like a call back today.

## 2017-10-23 ENCOUNTER — OFFICE VISIT (OUTPATIENT)
Dept: GERIATRIC MEDICINE | Age: 82
End: 2017-10-23

## 2017-10-23 VITALS
BODY MASS INDEX: 21.43 KG/M2 | HEIGHT: 61 IN | DIASTOLIC BLOOD PRESSURE: 64 MMHG | OXYGEN SATURATION: 97 % | WEIGHT: 113.5 LBS | SYSTOLIC BLOOD PRESSURE: 119 MMHG | TEMPERATURE: 97.7 F | HEART RATE: 67 BPM | RESPIRATION RATE: 18 BRPM

## 2017-10-23 DIAGNOSIS — N32.81 OAB (OVERACTIVE BLADDER): ICD-10-CM

## 2017-10-23 DIAGNOSIS — K59.1 FUNCTIONAL DIARRHEA: Primary | ICD-10-CM

## 2017-10-23 DIAGNOSIS — F41.8 ANTICIPATORY ANXIETY: ICD-10-CM

## 2017-10-23 RX ORDER — LOPERAMIDE HYDROCHLORIDE 2 MG/1
2 CAPSULE ORAL EVERY OTHER DAY
COMMUNITY

## 2017-10-23 NOTE — PROGRESS NOTES
[place]   [phone]  ______________________________________________________________    Itz Abarca    10/23/2017     Your instruction for care after your visit today are as follows: Any questions do not hesitate to call Latisha Bal [facility] Nurse at [phone]. Please take:     Imodium (antidiarrhea medication) 1 tablet in the AM. If you experience diarrhea then you can take another tablet, up to 4 tablets daily. If you experience constipation or no bowel movement for 24 hours, then skip the next scheduled dose of medication until you have a bowel movement. Medications to STOP taking: There are no discontinued medications. I would like to prescribe you something for the overactive bladder. The medication is called Myrbetric. It is a great product and is very safe. It will allow for you to sleep through night without having to get up multiple times as well as during the day. I will leave it up to you to tell me if you would like to try it or hold off. I look forward to hearing from you. Follow-up Disposition: Return in about 1 month (around 11/23/2017) for discuss your bowels and progression of symptoms.       Devyn Ham NP

## 2017-10-23 NOTE — PATIENT INSTRUCTIONS
1   Stress Incontinence in Women: Care Instructions  Your Care Instructions  Stress incontinence is the accidental release of urine caused by activities that put pressure on your bladder. It may happen most often when you sneeze, cough, laugh, jog, or lift something heavy. This condition does not cause major health problems, but it can be embarrassing and interfere with your life. Treatment can cure or improve your symptoms. Follow-up care is a key part of your treatment and safety. Be sure to make and go to all appointments, and call your doctor if you are having problems. It's also a good idea to know your test results and keep a list of the medicines you take. How can you care for yourself at home? · Take your medicines exactly as prescribed. Call your doctor if you think you are having a problem with your medicine. · Limit caffeine and alcohol. They make you urinate more. · Do pelvic floor (Kegel) exercises, which tighten and strengthen pelvic muscles. To do Kegel exercises:  ¨ Squeeze the same muscles you would use to stop your urine. Your belly and thighs should not move. ¨ Hold the squeeze for 3 seconds, and then relax for 3 seconds. ¨ Start with 3 seconds. Then add 1 second each week until you are able to squeeze for 10 seconds. ¨ Repeat the exercise 10 to 15 times a session. Do three or more sessions a day. · Try wearing pads that absorb leaks. Or you may want to try to prevent leaks with a product like Poise Impressa, which you insert like a tampon. · Keep skin in the genital area dry. Petroleum jelly (like Vaseline) spread on the area may help protect your skin. When should you call for help? Call your doctor now or seek immediate medical care if:  · You develop a fever. · You feel like you need to urinate often, or you feel burning or pain when you urinate. · You have a hard time urinating when your bladder feels full. · Your urine looks bloody.   Watch closely for changes in your health, and be sure to contact your doctor if:  · Your bladder feels full even after you urinate. · You do not get better as expected. Where can you learn more? Go to http://andriy-francisco.info/. Enter I250 in the search box to learn more about \"Stress Incontinence in Women: Care Instructions. \"  Current as of: October 13, 2016  Content Version: 11.3  © 4166-9615 [employer]. Care instructions adapted under license by [employer] (which disclaims liability or warranty for this information). If you have questions about a medical condition or this instruction, always ask your healthcare professional. [address] any warranty or liability for your use of this information.

## 2017-10-23 NOTE — PROGRESS NOTES
Subjective: Cornelia Alejandre is a 80 y.o. female who presents for the following complaints today    Chief Complaint   Patient presents with    Follow-up     Patient here for follow up with loose stools. Patient has written chart of her stools. Current Outpatient Prescriptions on File Prior to Visit   Medication Sig Dispense Refill    cholecalciferol (VITAMIN D3) 1,000 unit tablet Take 1 Tab by mouth daily. Indications: OSTEOPOROSIS 30 Tab 5    atenolol (TENORMIN) 100 mg tablet Take 1 Tab by mouth daily. 90 Tab 3    clotrimazole-betamethasone (LOTRISONE) topical cream Apply twice daily to affected area x 10 days. 15 g 0    carbamide peroxide (DEBROX) 6.5 % otic solution Administer 5 Drops in right ear two (2) times a week. 150 mL 1    simvastatin (ZOCOR) 10 mg tablet Take 1 Tab by mouth nightly. Indications: HYPERLIPIDEMIA 90 Tab 1    hydrochlorothiazide (HYDRODIURIL) 12.5 mg tablet Take 1 Tab by mouth daily. 90 Tab 1    acetaminophen (TYLENOL) 325 mg tablet Take  by mouth every four (4) hours as needed for Pain.  peg 400-propylene glycol (SYSTANE) 0.4-0.3 % drop Administer 1 Drop to both eyes four (4) times daily.  amoxicillin (AMOXIL) 500 mg capsule Take 500 mg by mouth. Take 4 caps one hour prior to dental appointments. Indications: prophylactic      metronidazole (METROLOTION) 0.75 % lotion Apply  to affected area nightly. (Patient taking differently: Apply  to affected area every morning. Indications: ACNE ROSACEA) 59 mL 1    aspirin 81 mg tablet Take 81 mg by mouth daily. No current facility-administered medications on file prior to visit.         Allergies   Allergen Reactions    Latex Rash    Orabase (Benzocaine) [Benzocaine] Anaphylaxis    Adhesive Rash    Codeine Nausea and Vomiting    Darvocet A500 [Propoxyphene N-Acetaminophen] Unknown (comments)    Hydrocodone Unknown (comments)      Past Medical History:   Diagnosis Date    Allergic rhinitis     Arthritis     Benign hematuria     negative w/u by Dr. [employer] Hematuria     Hypertension     Osteoarthritis     Osteopenia     Overactive bladder     Rosacea     Rosacea conjunctivitis(372.31)     Shingles     Stress incontinence       Past Surgical History:   Procedure Laterality Date    ENDOSCOPY[facility]ON[facility] DIAG[facility]ST[facility]  7/2005    [facility]    HX CATARACT REMOVAL  7/2004    bilateral    HX ORTHOPAEDIC      toe surgery for bone spurs    HX IRENE AND BSO  1980    TOTAL HIP ARTHROPLASTY  2000    left    TOTAL HIP ARTHROPLASTY  2001    right      Social History   Substance Use Topics    Smoking status: Never Smoker    Smokeless tobacco: Never Used    Alcohol use No      Family History   Problem Relation Age of Onset    Stroke Mother      x2    Heart Attack Mother 61    Parkinsonism Father     Breast Cancer Sister     Hypertension Sister     Arthritis-osteo Sister     Heart defect Sister      MVP    Stroke Sister     Arthritis-osteo Sister     Hypertension Sister     Diabetes Sister     Breast Cancer Sister     Other Sister      polycythemia vera    Allergic Rhinitis Son     Hypertension Son     Headache Son     Allergic Rhinitis Daughter     Allergic Rhinitis Son         Lab Results   Component Value Date/Time    WBC 6.0 10/18/2017 12:20 PM    HGB 13.5 10/18/2017 12:20 PM    HCT 40.4 10/18/2017 12:20 PM    PLATELET 164 60/03/2434 12:20 PM       Lab Results   Component Value Date/Time    ALT (SGPT) 27 10/18/2017 12:20 PM    AST (SGOT) 44 10/18/2017 12:20 PM    Creatinine 0.71 10/18/2017 12:20 PM    BUN 19 10/18/2017 12:20 PM    Sodium 140 10/18/2017 12:20 PM    Potassium 4.4 10/18/2017 12:20 PM       Lab Results   Component Value Date/Time    Cholesterol, total 161 10/18/2017 12:20 PM    HDL Cholesterol 71 10/18/2017 12:20 PM    LDL, calculated 69 10/18/2017 12:20 PM    Triglyceride 106 10/18/2017 12:20 PM    TSH 2.240 10/18/2017 12:20 PM    Hemoglobin A1c 5.9 10/18/2017 12:20 PM       Objective: Vitals:    10/23/17 1123   BP: 119/64   Pulse: 67   Resp: 18   Temp: 97.7 °F (36.5 °C)   TempSrc: Oral   SpO2: 97%   Weight: 113 lb 8 oz (51.5 kg)   Height: 5' 1\" (1.549 m)       Review of Systems   Constitutional: Positive for malaise/fatigue. Negative for chills, diaphoresis, fever and weight loss. HENT: Negative for congestion, ear discharge, ear pain, sore throat and tinnitus. Eyes: Negative for blurred vision, photophobia, pain, discharge and redness. Respiratory: Negative for cough, sputum production, shortness of breath, wheezing and stridor. Cardiovascular: Negative for chest pain, palpitations, orthopnea, leg swelling and PND. Gastrointestinal: Positive for diarrhea. Negative for abdominal pain, blood in stool, constipation, heartburn, nausea and vomiting. Genitourinary: Positive for frequency and urgency. Negative for dysuria, flank pain and hematuria. Musculoskeletal: Negative for back pain, falls, joint pain and neck pain. Skin: Negative for itching and rash. Bump on her left side    Neurological: Negative for dizziness, tingling, sensory change, focal weakness, seizures, loss of consciousness and headaches. Psychiatric/Behavioral: Positive for depression. Negative for memory loss and substance abuse. The patient is nervous/anxious. The patient does not have insomnia. Physical Exam   Constitutional: She is oriented to person, place, and time. She appears well-developed and well-nourished. She is active and cooperative. Non-toxic appearance. She does not have a sickly appearance. She does not appear ill. No distress. Thin, fragile in appearance  elderly female, anxiety is better, in no acute distress. HENT:   Head: Normocephalic and atraumatic.    Right Ear: Hearing, tympanic membrane and external ear normal.   Left Ear: Hearing, tympanic membrane and external ear normal.   Nose: Nose normal.   Mouth/Throat: Oropharynx is clear and moist and mucous membranes are normal. No oropharyngeal exudate. Mild wax accumulation in both ears but TM is easily visualized and patent. Eyes: Conjunctivae, EOM and lids are normal. Pupils are unequal.   Right pupil is reactive 3.5 mm in size   Left pupil is reactive 4.5 mm size. She states she has never been told this before. Both are equally reactive. Neck: Trachea normal, normal range of motion and full passive range of motion without pain. Neck supple. No hepatojugular reflux and no JVD present. No tracheal tenderness present. Carotid bruit is not present. No tracheal deviation, no edema and no erythema present. No thyroid mass and no thyromegaly present. Cardiovascular: Normal rate, regular rhythm, S1 normal, S2 normal, normal heart sounds, intact distal pulses and normal pulses. No extrasystoles are present. PMI is not displaced. Exam reveals no gallop and no friction rub. No murmur heard. Pulmonary/Chest: Effort normal and breath sounds normal. No stridor. Abdominal: Soft. Normal appearance. She exhibits no distension, no abdominal bruit, no ascites and no mass. Bowel sounds are increased. There is no hepatosplenomegaly. There is no tenderness. There is no rigidity, no rebound, no guarding, no CVA tenderness, no tenderness at McBurney's point and negative Cochran's sign. No hernia. Hyperactive during exam.    Musculoskeletal: Normal range of motion. She exhibits no edema or tenderness. No joint erythema or tenderness noted. Gait is normal with extremity weakness. Peripheral pulses are intact, sensation is normal and capillary refill is less than 2 sec. Lymphadenopathy:        Head (right side): No submental, no submandibular, no tonsillar, no preauricular, no posterior auricular and no occipital adenopathy present. Head (left side): No submental, no submandibular, no tonsillar, no preauricular, no posterior auricular and no occipital adenopathy present. She has no cervical adenopathy. She has no axillary adenopathy. Neurological: She is alert and oriented to person, place, and time. She has normal strength and normal reflexes. She is not disoriented. No cranial nerve deficit or sensory deficit. She displays a negative Romberg sign. Coordination and gait normal.   Reflex Scores:       Tricep reflexes are 2+ on the right side and 2+ on the left side. Bicep reflexes are 2+ on the right side and 2+ on the left side. Brachioradialis reflexes are 2+ on the right side and 2+ on the left side. Patellar reflexes are 2+ on the right side and 2+ on the left side. Achilles reflexes are 2+ on the right side and 2+ on the left side. Skin: Skin is warm, dry and intact. Lesion noted. No bruising, no laceration and no rash noted. She is not diaphoretic. No cyanosis. No pallor. Nails show no clubbing. Psychiatric: Her speech is normal. Judgment and thought content normal. Her mood appears anxious. Her affect is labile. She is withdrawn. She is not hyperactive. Cognition and memory are impaired. She does not exhibit a depressed mood. Continues to ramble and not make sense of her statements. She is slightly better, she is rambling and getting confused with her thoughts. She is more organized with her complaints today. Nursing note and vitals reviewed. Assessment/ Plan:   During today's visit the following medications or treatments were requested to be discontinued by the provider : There are no discontinued medications. Diagnoses and all orders for this visit:    1. Functional diarrhea - reports this has cleared up and she is not having bouts of diarrhea at this time. She is not taking the imodium as directed. I have attempted to re-educate and asked her to take the medication once daily in the am as this will allow for the most functional training of her bowels. She states she was confused. We can follow up in 1 month to discuss her bowels.      2. Anticipatory anxiety    3. OAB (overactive bladder) - I would like to recommend starting some Myrbetric orally to help with the bladder symptoms. I am sending her some information on this as will await her approval prior to ordering. I have discussed the diagnosis with the patient and the intended plan as seen in the above orders. The patient has received an after-visit summary and questions were answered concerning future plans. I have discussed medication side effects and warnings with the patient as well. Follow-up Disposition: Please call to make your appt for follow up in about 1 month (around 11/23/2017) for discuss your bowels and progression of symptoms.

## 2017-10-23 NOTE — MR AVS SNAPSHOT
Visit Information Date & Time Provider Department Dept. Phone Encounter #  
 10/23/2017 11:00 AM Cori Elizabeth, [facility] [phone] [id number] Follow-up Instructions Return in about 1 month (around 11/23/2017) for discuss your bowels and progression of symptoms. Follow-up and Disposition History Your Appointments 11/22/2017 11:30 AM  
ROUTINE CARE with Cori Elizabeth  [facility] ([facility]) Appt Note: Pt 1 month f/u for bowels. [address]  
  
   
 [address] [id number] Upcoming Health Maintenance Date Due  
 MEDICARE YEARLY EXAM 3/25/2018 GLAUCOMA SCREENING Q2Y 1/23/2019 DTaP/Tdap/Td series (2 - Td) 10/18/2027 Allergies as of 10/23/2017  Review Complete On: 10/23/2017 By: Cori Elizabeth NP Severity Noted Reaction Type Reactions Latex  05/17/2016    Rash Orabase (Benzocaine) [Benzocaine] High 05/17/2016    Anaphylaxis Adhesive  05/17/2016    Rash Codeine  09/14/2011    Nausea and Vomiting Haydee Sandman [Propoxyphene N-acetaminophen]  09/14/2011    Unknown (comments) Hydrocodone  09/14/2011    Unknown (comments) Current Immunizations  Reviewed on 9/30/2011 Name Date Influenza Vaccine 10/1/2016, 10/1/2015 Influenza Vaccine Whole 9/29/2011 Pneumococcal Conjugate (PCV-13) 7/30/2015 Pneumococcal Polysaccharide (PPSV-23) 9/1/2008 Pneumococcal Vaccine (Unspecified Type) 10/1/1998 Td 9/30/2009 ZZZ-RETIRED (DO NOT USE) Pneumococcal Vaccine (Unspecified Type) 9/1/2008, 10/1/1998 Zoster Vaccine, Live 5/11/2012 Not reviewed this visit You Were Diagnosed With   
  
 Codes Comments Functional diarrhea    -  Primary ICD-10-CM: K59.1 ICD-9-CM: 564.5 Anticipatory anxiety     ICD-10-CM: F41.1 ICD-9-CM: 300.00   
 OAB (overactive bladder)     ICD-10-CM: N32.81 
 ICD-9-CM: 596.51 Vitals BP Pulse Temp Resp Height(growth percentile) Weight(growth percentile)  
 119/64 (BP 1 Location: Right arm, BP Patient Position: Sitting) 67 97.7 °F (36.5 °C) (Oral) 18 5' 1\" (1.549 m) 113 lb 8 oz (51.5 kg) SpO2 BMI OB Status Smoking Status 97% 21.45 kg/m2 Hysterectomy Never Smoker BMI and BSA Data Body Mass Index Body Surface Area  
 21.45 kg/m 2 1.49 m 2 Preferred Pharmacy Pharmacy Name Phone [facility] [phone] Your Updated Medication List  
  
   
This list is accurate as of: 10/23/17  3:42 PM.  Always use your most recent med list.  
  
  
  
  
 acetaminophen 325 mg tablet Commonly known as:  TYLENOL Take  by mouth every four (4) hours as needed for Pain.  
  
 amoxicillin 500 mg capsule Commonly known as:  AMOXIL Take 500 mg by mouth. Take 4 caps one hour prior to dental appointments. Indications: prophylactic  
  
 aspirin 81 mg tablet Take 81 mg by mouth daily. atenolol 100 mg tablet Commonly known as:  TENORMIN Take 1 Tab by mouth daily. CALCIUM 600 PO Take 600 mg by mouth two (2) times a day. carbamide peroxide 6.5 % otic solution Commonly known as:  Efrain Gonzalez Administer 5 Drops in right ear two (2) times a week. cholecalciferol 1,000 unit tablet Commonly known as:  VITAMIN D3 Take 1 Tab by mouth daily. Indications: OSTEOPOROSIS  
  
 clotrimazole-betamethasone topical cream  
Commonly known as:  Thomos Apley Apply twice daily to affected area x 10 days. hydroCHLOROthiazide 12.5 mg tablet Commonly known as:  HYDRODIURIL Take 1 Tab by mouth daily. loperamide 2 mg capsule Commonly known as:  IMODIUM Take  by mouth.  
  
 metroNIDAZOLE 0.75 % lotion Commonly known as:  Eran Fees Apply  to affected area nightly. peg 400-propylene glycol 0.4-0.3 % Drop Commonly known as:  SYSTANE  
 Administer 1 Drop to both eyes four (4) times daily. simvastatin 10 mg tablet Commonly known as:  ZOCOR Take 1 Tab by mouth nightly. Indications: HYPERLIPIDEMIA Follow-up Instructions Return in about 1 month (around 11/23/2017) for discuss your bowels and progression of symptoms. Patient Instructions 1 Stress Incontinence in Women: Care Instructions Your Care Instructions Stress incontinence is the accidental release of urine caused by activities that put pressure on your bladder. It may happen most often when you sneeze, cough, laugh, jog, or lift something heavy. This condition does not cause major health problems, but it can be embarrassing and interfere with your life. Treatment can cure or improve your symptoms. Follow-up care is a key part of your treatment and safety. Be sure to make and go to all appointments, and call your doctor if you are having problems. It's also a good idea to know your test results and keep a list of the medicines you take. How can you care for yourself at home? · Take your medicines exactly as prescribed. Call your doctor if you think you are having a problem with your medicine. · Limit caffeine and alcohol. They make you urinate more. · Do pelvic floor (Kegel) exercises, which tighten and strengthen pelvic muscles. To do Kegel exercises: 
¨ Squeeze the same muscles you would use to stop your urine. Your belly and thighs should not move. ¨ Hold the squeeze for 3 seconds, and then relax for 3 seconds. ¨ Start with 3 seconds. Then add 1 second each week until you are able to squeeze for 10 seconds. ¨ Repeat the exercise 10 to 15 times a session. Do three or more sessions a day. · Try wearing pads that absorb leaks. Or you may want to try to prevent leaks with a product like Poise Impressa, which you insert like a tampon. · Keep skin in the genital area dry.  Petroleum jelly (like Vaseline) spread on the area may help protect your skin. When should you call for help? Call your doctor now or seek immediate medical care if: 
· You develop a fever. · You feel like you need to urinate often, or you feel burning or pain when you urinate. · You have a hard time urinating when your bladder feels full. · Your urine looks bloody. Watch closely for changes in your health, and be sure to contact your doctor if: 
· Your bladder feels full even after you urinate. · You do not get better as expected. Where can you learn more? Go to http://andriy-francisco.info/. Enter T813 in the search box to learn more about \"Stress Incontinence in Women: Care Instructions. \" Current as of: October 13, 2016 Content Version: 11.3 © 5860-6166 [employer]. Care instructions adapted under license by [employer] (which disclaims liability or warranty for this information). If you have questions about a medical condition or this instruction, always ask your healthcare professional. [place] [address] any warranty or liability for your use of this information. Patient Instructions History Introducing [facility]! Ulices Clarke introduces Street[employer] patient portal. Now you can access parts of your medical record, email your doctor's office, and request medication refills online. 1. In your internet browser, go to [email or web address] 2. Click on the First Time User? Click Here link in the Sign In box. You will see the New Member Sign Up page. 3. Enter your Street[employer] Access Code exactly as it appears below. You will not need to use this code after youve completed the sign-up process. If you do not sign up before the expiration date, you must request a new code. · [employer] Access Code: [email or web address] Expires: 1/16/2018 11:03 AM 
 
4.  Enter the last four digits of your Social Security Number (xxxx) and Date of Birth (mm/dd/yyyy) as indicated and click Submit. You will be taken to the next sign-up page. 5. Create a Vessel ID. This will be your Vessel login ID and cannot be changed, so think of one that is secure and easy to remember. 6. Create a Vessel password. You can change your password at any time. 7. Enter your Password Reset Question and Answer. This can be used at a later time if you forget your password. 8. Enter your e-mail address. You will receive e-mail notification when new information is available in [address] 9. Click Sign Up. You can now view and download portions of your medical record. 10. Click the Download Summary menu link to download a portable copy of your medical information. If you have questions, please visit the Frequently Asked Questions section of the Vessel website. Remember, Vessel is NOT to be used for urgent needs. For medical emergencies, dial 911. Now available from your iPhone and Android! Please provide this summary of care documentation to your next provider. Your primary care clinician is listed as Rachel Mcconnell. If you have any questions after today's visit, please call [phone].

## 2017-11-21 ENCOUNTER — TELEPHONE (OUTPATIENT)
Dept: GERIATRIC MEDICINE | Age: 82
End: 2017-11-21

## 2017-11-22 ENCOUNTER — OFFICE VISIT (OUTPATIENT)
Dept: GERIATRIC MEDICINE | Age: 82
End: 2017-11-22

## 2017-11-22 VITALS
HEART RATE: 65 BPM | OXYGEN SATURATION: 97 % | DIASTOLIC BLOOD PRESSURE: 60 MMHG | SYSTOLIC BLOOD PRESSURE: 123 MMHG | RESPIRATION RATE: 18 BRPM | WEIGHT: 110.7 LBS | HEIGHT: 61 IN | TEMPERATURE: 97.7 F | BODY MASS INDEX: 20.9 KG/M2

## 2017-11-22 DIAGNOSIS — K59.1 FUNCTIONAL DIARRHEA: Primary | ICD-10-CM

## 2017-11-22 DIAGNOSIS — G47.01 INSOMNIA DUE TO MEDICAL CONDITION: ICD-10-CM

## 2017-11-22 DIAGNOSIS — I10 ESSENTIAL HYPERTENSION: ICD-10-CM

## 2017-11-22 DIAGNOSIS — F41.8 ANTICIPATORY ANXIETY: ICD-10-CM

## 2017-11-22 DIAGNOSIS — N32.81 OAB (OVERACTIVE BLADDER): ICD-10-CM

## 2017-11-22 NOTE — MR AVS SNAPSHOT
Visit Information Date & Time Provider Department Dept. Phone Encounter #  
 11/22/2017 11:30 AM Millie East, [facility] [phone] [id number] Follow-up Instructions Return in about 1 month (around 12/22/2017). Follow-up and Disposition History Upcoming Health Maintenance Date Due  
 MEDICARE YEARLY EXAM 3/25/2018 GLAUCOMA SCREENING Q2Y 1/23/2019 DTaP/Tdap/Td series (2 - Td) 10/18/2027 Allergies as of 11/22/2017  Review Complete On: 11/22/2017 By: Mir Villatoro Severity Noted Reaction Type Reactions Latex  05/17/2016    Rash Orabase (Benzocaine) [Benzocaine] High 05/17/2016    Anaphylaxis Adhesive  05/17/2016    Rash Codeine  09/14/2011    Nausea and Vomiting Obadiah Cody [Propoxyphene N-acetaminophen]  09/14/2011    Unknown (comments) Hydrocodone  09/14/2011    Unknown (comments) Current Immunizations  Reviewed on 9/30/2011 Name Date Influenza Vaccine 10/1/2016, 10/1/2015 Influenza Vaccine Whole 9/29/2011 Pneumococcal Conjugate (PCV-13) 7/30/2015 Pneumococcal Polysaccharide (PPSV-23) 9/1/2008 Pneumococcal Vaccine (Unspecified Type) 10/1/1998 Td 9/30/2009 ZZZ-RETIRED (DO NOT USE) Pneumococcal Vaccine (Unspecified Type) 9/1/2008, 10/1/1998 Zoster Vaccine, Live 5/11/2012 Not reviewed this visit You Were Diagnosed With   
  
 Codes Comments Functional diarrhea    -  Primary ICD-10-CM: K59.1 ICD-9-CM: 564.5 Anticipatory anxiety     ICD-10-CM: F41.1 ICD-9-CM: 300.00   
 OAB (overactive bladder)     ICD-10-CM: N32.81 ICD-9-CM: 596.51 Essential hypertension     ICD-10-CM: I10 
ICD-9-CM: 401.9 Insomnia due to medical condition     ICD-10-CM: G47.01 
ICD-9-CM: 327.01 Vitals BP Pulse Temp Resp Height(growth percentile) Weight(growth percentile)  123/60 (BP 1 Location: Right arm, BP Patient Position: Sitting) 65 97.7 °F (36.5 °C) (Oral) 18 5' 1\" (1.549 m) 110 lb 11.2 oz (50.2 kg) SpO2 BMI OB Status Smoking Status 97% 20.92 kg/m2 Hysterectomy Never Smoker BMI and BSA Data Body Mass Index Body Surface Area  
 20.92 kg/m 2 1.47 m 2 Preferred Pharmacy Pharmacy Name Phone [facility] [phone] Your Updated Medication List  
  
   
This list is accurate as of: 11/22/17 11:59 PM.  Always use your most recent med list.  
  
  
  
  
 acetaminophen 325 mg tablet Commonly known as:  TYLENOL Take  by mouth every four (4) hours as needed for Pain.  
  
 amoxicillin 500 mg capsule Commonly known as:  AMOXIL Take 500 mg by mouth. Take 4 caps one hour prior to dental appointments. Indications: prophylactic  
  
 aspirin 81 mg tablet Take 81 mg by mouth daily. atenolol 100 mg tablet Commonly known as:  TENORMIN Take 1 Tab by mouth daily. CALCIUM 600 PO Take 600 mg by mouth two (2) times a day. carbamide peroxide 6.5 % otic solution Commonly known as:  Chong Haskins Administer 5 Drops in right ear two (2) times a week. cholecalciferol 1,000 unit tablet Commonly known as:  VITAMIN D3 Take 1 Tab by mouth daily. Indications: OSTEOPOROSIS  
  
 clotrimazole-betamethasone topical cream  
Commonly known as:  Reny Fury Apply twice daily to affected area x 10 days. hydroCHLOROthiazide 12.5 mg tablet Commonly known as:  HYDRODIURIL Take 1 Tab by mouth daily. loperamide 2 mg capsule Commonly known as:  IMODIUM Take  by mouth.  
  
 metroNIDAZOLE 0.75 % lotion Commonly known as:  Karle Ault Apply  to affected area nightly. mirabegron ER 25 mg ER tablet Commonly known as:  MYRBETRIQ Take 1 Tab by mouth daily. Indications: Bladder Hyperactivity  
  
 peg 400-propylene glycol 0.4-0.3 % Drop Commonly known as:  SYSTANE Administer 1 Drop to both eyes four (4) times daily. simvastatin 10 mg tablet Commonly known as:  ZOCOR Take 1 Tab by mouth nightly. Indications: HYPERLIPIDEMIA Prescriptions Sent to Pharmacy Refills  
 mirabegron ER (MYRBETRIQ) 25 mg ER tablet 1 Sig: Take 1 Tab by mouth daily. Indications: Bladder Hyperactivity Class: Normal  
 Pharmacy: [address] [place] [address] Maite Cartagena  #: [phone] Route: Oral  
  
Follow-up Instructions Return in about 1 month (around 12/22/2017). Patient Instructions Urge Incontinence in Women: Care Instructions Your Care Instructions Urge incontinence occurs when the need to urinate is so strong that you cannot reach the toilet in time, even when your bladder contains only a small amount of urine. This is also called overactive bladder or unstable bladder. Some women may have no warning before they leak urine. This condition does not cause major health problems. But it can be embarrassing and can affect a woman's self-esteem and confidence. Treatment can cure or improve your symptoms. Follow-up care is a key part of your treatment and safety. Be sure to make and go to all appointments, and call your doctor if you are having problems. It's also a good idea to know your test results and keep a list of the medicines you take. How can you care for yourself at home? · Be safe with medicines. Take your medicines exactly as prescribed. Call your doctor if you think you are having a problem with your medicine. You will get more details on the specific medicines your doctor prescribes. · Limit caffeine and alcohol. They stimulate urine production. · Urinate every 2 to 4 hours during waking hours, even if you feel that you do not have to go. · Do pelvic floor (Kegel) exercises, which tighten and strengthen pelvic muscles. To do Kegel exercises: 
¨ Squeeze the same muscles you would use to stop your urine. Your belly and thighs should not move. ¨ Hold the squeeze for 3 seconds, then relax for 3 seconds. ¨ Start with 3 seconds. Then add 1 second each week until you are able to squeeze for 10 seconds. ¨ Repeat the exercise 10 to 15 times for each session. Do three or more sessions each day. · Try wearing pads that absorb the leaks. · Keep skin in the genital area dry. When should you call for help? Call your doctor now or seek immediate medical care if: 
? · You have new urinary symptoms. These may include leaking urine, having pain when urinating, or feeling like you need to urinate often. ? Watch closely for changes in your health, and be sure to contact your doctor if: 
? · You do not get better as expected. Where can you learn more? Go to http://andriy-francisco.info/. Enter P768 in the search box to learn more about \"Urge Incontinence in Women: Care Instructions. \" Current as of: October 13, 2016 Content Version: 11.4 © 6819-9935 [employer]. Care instructions adapted under license by [employer] (which disclaims liability or warranty for this information). If you have questions about a medical condition or this instruction, always ask your healthcare professional. [place] [address] any warranty or liability for your use of this information. Introducing [facility]! Graciela Ledesma introduces [facility] patient portal. Now you can access parts of your medical record, email your doctor's office, and request medication refills online. 1. In your internet browser, go to [email or web address] 2. Click on the First Time User? Click Here link in the Sign In box. You will see the New Member Sign Up page. 3. Enter your [facility] Access Code exactly as it appears below. You will not need to use this code after youve completed the sign-up process. If you do not sign up before the expiration date, you must request a new code. · [employer] Access Code: [email or web address] Expires: 1/16/2018 10:03 AM 
 
4. Enter the last four digits of your Social Security Number (xxxx) and Date of Birth (mm/dd/yyyy) as indicated and click Submit. You will be taken to the next sign-up page. 5. Create a [employer] ID. This will be your [employer] login ID and cannot be changed, so think of one that is secure and easy to remember. 6. Create a [employer] password. You can change your password at any time. 7. Enter your Password Reset Question and Answer. This can be used at a later time if you forget your password. 8. Enter your e-mail address. You will receive e-mail notification when new information is available in [address] 9. Click Sign Up. You can now view and download portions of your medical record. 10. Click the Download Summary menu link to download a portable copy of your medical information. If you have questions, please visit the Frequently Asked Questions section of the [employer] website. Remember, [employer] is NOT to be used for urgent needs. For medical emergencies, dial 911. Now available from your iPhone and Android! Please provide this summary of care documentation to your next provider. Your primary care clinician is listed as Umair Mendes. If you have any questions after today's visit, please call [phone].

## 2017-11-22 NOTE — TELEPHONE ENCOUNTER
I called the patient to remind her of her appointment on November 22, 2017. Pt stated that they will be on time and will not miss their appointment for the world.

## 2017-11-22 NOTE — PROGRESS NOTES
Visit Vitals    /60 (BP 1 Location: Right arm, BP Patient Position: Sitting)    Pulse 65    Temp 97.7 °F (36.5 °C) (Oral)    Resp 18    Ht 5' 1\" (1.549 m)    Wt 110 lb 11.2 oz (50.2 kg)    SpO2 97%    BMI 20.92 kg/m2      Chief Complaint   Patient presents with    Follow-up     Patient here for follow up for loose stools. Patient reports they are better.

## 2017-11-25 NOTE — PATIENT INSTRUCTIONS
Urge Incontinence in Women: Care Instructions  Your Care Instructions    Urge incontinence occurs when the need to urinate is so strong that you cannot reach the toilet in time, even when your bladder contains only a small amount of urine. This is also called overactive bladder or unstable bladder. Some women may have no warning before they leak urine. This condition does not cause major health problems. But it can be embarrassing and can affect a woman's self-esteem and confidence. Treatment can cure or improve your symptoms. Follow-up care is a key part of your treatment and safety. Be sure to make and go to all appointments, and call your doctor if you are having problems. It's also a good idea to know your test results and keep a list of the medicines you take. How can you care for yourself at home? · Be safe with medicines. Take your medicines exactly as prescribed. Call your doctor if you think you are having a problem with your medicine. You will get more details on the specific medicines your doctor prescribes. · Limit caffeine and alcohol. They stimulate urine production. · Urinate every 2 to 4 hours during waking hours, even if you feel that you do not have to go. · Do pelvic floor (Kegel) exercises, which tighten and strengthen pelvic muscles. To do Kegel exercises:  ¨ Squeeze the same muscles you would use to stop your urine. Your belly and thighs should not move. ¨ Hold the squeeze for 3 seconds, then relax for 3 seconds. ¨ Start with 3 seconds. Then add 1 second each week until you are able to squeeze for 10 seconds. ¨ Repeat the exercise 10 to 15 times for each session. Do three or more sessions each day. · Try wearing pads that absorb the leaks. · Keep skin in the genital area dry. When should you call for help? Call your doctor now or seek immediate medical care if:  ? · You have new urinary symptoms.  These may include leaking urine, having pain when urinating, or feeling like you need to urinate often. ? Watch closely for changes in your health, and be sure to contact your doctor if:  ? · You do not get better as expected. Where can you learn more? Go to http://andriy-francisco.info/. Enter F839 in the search box to learn more about \"Urge Incontinence in Women: Care Instructions. \"  Current as of: October 13, 2016  Content Version: 11.4  © 0503-4747 [employer]. Care instructions adapted under license by [employer] (which disclaims liability or warranty for this information). If you have questions about a medical condition or this instruction, always ask your healthcare professional. [address] any warranty or liability for your use of this information.

## 2017-11-25 NOTE — PROGRESS NOTES
Subjective: Flo Ching is a 80 y.o. female who presents for the following complaints today    Chief Complaint   Patient presents with    Follow-up     Patient here for follow up for loose stools. Patient reports they are better. Current Outpatient Prescriptions on File Prior to Visit   Medication Sig Dispense Refill    CALCIUM CARBONATE (CALCIUM 600 PO) Take 600 mg by mouth two (2) times a day.  loperamide (IMODIUM) 2 mg capsule Take  by mouth.  cholecalciferol (VITAMIN D3) 1,000 unit tablet Take 1 Tab by mouth daily. Indications: OSTEOPOROSIS 30 Tab 5    atenolol (TENORMIN) 100 mg tablet Take 1 Tab by mouth daily. 90 Tab 3    clotrimazole-betamethasone (LOTRISONE) topical cream Apply twice daily to affected area x 10 days. 15 g 0    simvastatin (ZOCOR) 10 mg tablet Take 1 Tab by mouth nightly. Indications: HYPERLIPIDEMIA 90 Tab 1    hydrochlorothiazide (HYDRODIURIL) 12.5 mg tablet Take 1 Tab by mouth daily. 90 Tab 1    acetaminophen (TYLENOL) 325 mg tablet Take  by mouth every four (4) hours as needed for Pain.  peg 400-propylene glycol (SYSTANE) 0.4-0.3 % drop Administer 1 Drop to both eyes four (4) times daily.  metronidazole (METROLOTION) 0.75 % lotion Apply  to affected area nightly. (Patient taking differently: Apply  to affected area every morning. Indications: ACNE ROSACEA) 59 mL 1    aspirin 81 mg tablet Take 81 mg by mouth daily.  carbamide peroxide (DEBROX) 6.5 % otic solution Administer 5 Drops in right ear two (2) times a week. 150 mL 1    amoxicillin (AMOXIL) 500 mg capsule Take 500 mg by mouth. Take 4 caps one hour prior to dental appointments. Indications: prophylactic       No current facility-administered medications on file prior to visit.         Allergies   Allergen Reactions    Latex Rash    Orabase (Benzocaine) [Benzocaine] Anaphylaxis    Adhesive Rash    Codeine Nausea and Vomiting    Darvocet A500 [Propoxyphene N-Acetaminophen] Unknown (comments)    Hydrocodone Unknown (comments)      Past Medical History:   Diagnosis Date    Allergic rhinitis     Arthritis     Benign hematuria     negative w/u by Dr. Shreya Kumar    Hematuria     Hypertension     Osteoarthritis     Osteopenia     Overactive bladder     Rosacea     Rosacea conjunctivitis(372.31)     Shingles     Stress incontinence       Past Surgical History:   Procedure Laterality Date    ENDOSCOPY, COLON, DIAGNOSTIC  7/2005    (Unice Blinks)    HX CATARACT REMOVAL  7/2004    bilateral    HX ORTHOPAEDIC      toe surgery for bone spurs    HX IRENE AND BSO  1980    TOTAL HIP ARTHROPLASTY  2000    left    TOTAL HIP ARTHROPLASTY  2001    right      Social History   Substance Use Topics    Smoking status: Never Smoker    Smokeless tobacco: Never Used    Alcohol use No      Family History   Problem Relation Age of Onset    Stroke Mother      x2    Heart Attack Mother 61    Parkinsonism Father     Breast Cancer Sister     Hypertension Sister     Arthritis-osteo Sister     Heart defect Sister      MVP    Stroke Sister     Arthritis-osteo Sister     Hypertension Sister     Diabetes Sister     Breast Cancer Sister     Other Sister      polycythemia vera    Allergic Rhinitis Son     Hypertension Son     Headache Son     Allergic Rhinitis Daughter     Allergic Rhinitis Son         Lab Results   Component Value Date/Time    WBC 6.0 10/18/2017 12:20 PM    HGB 13.5 10/18/2017 12:20 PM    HCT 40.4 10/18/2017 12:20 PM    PLATELET 613 80/60/6100 12:20 PM       Lab Results   Component Value Date/Time    ALT (SGPT) 27 10/18/2017 12:20 PM    AST (SGOT) 44 10/18/2017 12:20 PM    Creatinine 0.71 10/18/2017 12:20 PM    BUN 19 10/18/2017 12:20 PM    Sodium 140 10/18/2017 12:20 PM    Potassium 4.4 10/18/2017 12:20 PM       Lab Results   Component Value Date/Time    Cholesterol, total 161 10/18/2017 12:20 PM    HDL Cholesterol 71 10/18/2017 12:20 PM    LDL, calculated 69 10/18/2017 12:20 PM Triglyceride 106 10/18/2017 12:20 PM    TSH 2.240 10/18/2017 12:20 PM    Hemoglobin A1c 5.9 10/18/2017 12:20 PM       Objective:     Vitals:    11/22/17 1147   BP: 123/60   Pulse: 65   Resp: 18   Temp: 97.7 °F (36.5 °C)   TempSrc: Oral   SpO2: 97%   Weight: 110 lb 11.2 oz (50.2 kg)   Height: 5' 1\" (1.549 m)     Review of Systems   Constitutional: Positive for malaise/fatigue. Negative for chills, diaphoresis, fever and weight loss. HENT: Negative for congestion, ear discharge, ear pain, sore throat and tinnitus. Eyes: Negative for blurred vision, photophobia, pain, discharge and redness. Respiratory: Negative for cough, sputum production, shortness of breath, wheezing and stridor. Cardiovascular: Negative for chest pain, palpitations, orthopnea, leg swelling and PND. Gastrointestinal: Negative for abdominal pain, blood in stool, constipation, diarrhea, heartburn, nausea and vomiting. Genitourinary: Positive for frequency and urgency. Negative for dysuria, flank pain and hematuria. Musculoskeletal: Negative for back pain, falls, joint pain and neck pain. Skin: Negative for itching and rash. Bump on her left side    Neurological: Negative for dizziness, tingling, sensory change, focal weakness, seizures, loss of consciousness and headaches. Psychiatric/Behavioral: Positive for depression. Negative for memory loss and substance abuse. The patient is nervous/anxious. The patient does not have insomnia. Physical Exam   Constitutional: She is oriented to person, place, and time. She appears well-developed and well-nourished. She is active and cooperative. Non-toxic appearance. She does not have a sickly appearance. She does not appear ill. No distress. Thin, fragile in appearance  elderly female, anxiety is better, in no acute distress. HENT:   Head: Normocephalic and atraumatic.    Right Ear: Hearing, tympanic membrane and external ear normal.   Left Ear: Hearing, tympanic membrane and external ear normal.   Nose: Nose normal.   Mouth/Throat: Oropharynx is clear and moist and mucous membranes are normal. No oropharyngeal exudate. Mild wax accumulation in both ears but TM is easily visualized and patent. Eyes: Conjunctivae, EOM and lids are normal. Pupils are unequal.   Right pupil is reactive 3.5 mm in size   Left pupil is reactive 4.5 mm size. She states she has never been told this before. Both are equally reactive. Neck: Trachea normal, normal range of motion and full passive range of motion without pain. Neck supple. No hepatojugular reflux and no JVD present. No tracheal tenderness present. Carotid bruit is not present. No tracheal deviation, no edema and no erythema present. No thyroid mass and no thyromegaly present. Cardiovascular: Normal rate, regular rhythm, S1 normal, S2 normal, normal heart sounds, intact distal pulses and normal pulses. No extrasystoles are present. PMI is not displaced. Exam reveals no gallop and no friction rub. No murmur heard. Pulmonary/Chest: Effort normal and breath sounds normal. No stridor. Abdominal: Soft. Normal appearance. She exhibits no distension, no abdominal bruit, no ascites and no mass. Bowel sounds are increased. There is no hepatosplenomegaly. There is no tenderness. There is no rigidity, no rebound, no guarding, no CVA tenderness, no tenderness at McBurney's point and negative Cochran's sign. No hernia. Hyperactive during exam.    Musculoskeletal: Normal range of motion. She exhibits no edema or tenderness. No joint erythema or tenderness noted. Gait is normal with extremity weakness. Peripheral pulses are intact, sensation is normal and capillary refill is less than 2 sec. Lymphadenopathy:        Head (right side): No submental, no submandibular, no tonsillar, no preauricular, no posterior auricular and no occipital adenopathy present.         Head (left side): No submental, no submandibular, no tonsillar, no preauricular, no posterior auricular and no occipital adenopathy present. She has no cervical adenopathy. She has no axillary adenopathy. Neurological: She is alert and oriented to person, place, and time. She has normal strength and normal reflexes. She is not disoriented. No cranial nerve deficit or sensory deficit. She displays a negative Romberg sign. Coordination and gait normal.   Reflex Scores:       Tricep reflexes are 2+ on the right side and 2+ on the left side. Bicep reflexes are 2+ on the right side and 2+ on the left side. Brachioradialis reflexes are 2+ on the right side and 2+ on the left side. Patellar reflexes are 2+ on the right side and 2+ on the left side. Achilles reflexes are 2+ on the right side and 2+ on the left side. Skin: Skin is warm, dry and intact. No bruising, no laceration, no lesion and no rash noted. She is not diaphoretic. No cyanosis. No pallor. Nails show no clubbing. Psychiatric: Her speech is normal. Judgment and thought content normal. Her mood appears anxious. Her affect is not labile. She is slowed and withdrawn. She is not hyperactive. Cognition and memory are impaired. She does not exhibit a depressed mood. Thoughts continue to be jar belled. She is slightly better, she is rambling and getting confused with her thoughts. She is more organized with her complaints today. Nursing note and vitals reviewed. Assessment/ Plan:   During today's visit the following medications or treatments were requested to be discontinued by the provider :     There are no discontinued medications. Diagnoses and all orders for this visit:    1. Functional diarrhea - She has not had any urgency or accidents with her bowels since starting the imodium daily. She is going to try and transition to every other day dosing. 2. Anticipatory anxiety - She is getting better with this process as she has had more control of her bowels with the Imodium. She is going to try and transition to EOD on the imodium as she would like to see if this will continue the pattern. 3. OAB (overactive bladder) - discussed with patient as this is the last piece of concern for her. She is still not sleeping well as she is up to the bathroom frequently for OAB. Discussed previously use of Myrbetriq for treatment. She agreed to this. I have ordered her a 30 days supply to see if it works for her. -     mirabegron ER (MYRBETRIQ) 25 mg ER tablet; Take 1 Tab by mouth daily. Indications: Bladder Hyperactivity    4. Essential hypertension    5. Insomnia due to medical condition  -     mirabegron ER (MYRBETRIQ) 25 mg ER tablet; Take 1 Tab by mouth daily. Indications: Bladder Hyperactivity    I have discussed the diagnosis with the patient and the intended plan as seen in the above orders. The patient has received an after-visit summary and questions were answered concerning future plans. I have discussed medication side effects and warnings with the patient as well. Follow-up Disposition: Return in about 1 month (around 12/22/2017).

## 2017-12-11 ENCOUNTER — TELEPHONE (OUTPATIENT)
Dept: GERIATRIC MEDICINE | Age: 82
End: 2017-12-11

## 2017-12-11 NOTE — TELEPHONE ENCOUNTER
Pt called this morning to let us know that she stopped taking this medication called mirabegron ER 25 mg. Pt states that she started noticing cramps in both of her legs. Pt is not sure if this is the reason why she is feeling this way.

## 2017-12-22 ENCOUNTER — OFFICE VISIT (OUTPATIENT)
Dept: GERIATRIC MEDICINE | Age: 82
End: 2017-12-22

## 2017-12-22 VITALS
WEIGHT: 112.3 LBS | TEMPERATURE: 98.7 F | SYSTOLIC BLOOD PRESSURE: 156 MMHG | RESPIRATION RATE: 20 BRPM | HEIGHT: 61 IN | HEART RATE: 78 BPM | OXYGEN SATURATION: 98 % | BODY MASS INDEX: 21.2 KG/M2 | DIASTOLIC BLOOD PRESSURE: 78 MMHG

## 2017-12-22 DIAGNOSIS — F32.9 REACTIVE DEPRESSION: ICD-10-CM

## 2017-12-22 DIAGNOSIS — N32.81 OAB (OVERACTIVE BLADDER): Primary | ICD-10-CM

## 2017-12-22 DIAGNOSIS — K59.1 FUNCTIONAL DIARRHEA: ICD-10-CM

## 2017-12-22 DIAGNOSIS — Z91.14 CONFLICTED ATTITUDE TOWARDS MEDICATION MANAGEMENT: ICD-10-CM

## 2017-12-22 DIAGNOSIS — F41.8 ANTICIPATORY ANXIETY: ICD-10-CM

## 2017-12-22 NOTE — PROGRESS NOTES
Reason for Visit:      Chief Complaint   Patient presents with    Stool Color Change     Patient reports that she bowel movements are better.  Urinary Frequency     Patient states that after starting the myrebtriq , she started with  leg cramps, she stopped the medication and the leg cramps stopped. 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 Subjective: Allergies   Allergen Reactions    Latex Rash    Orabase (Benzocaine) [Benzocaine] Anaphylaxis    Adhesive Rash    Codeine Nausea and Vomiting    Darvocet A500 [Propoxyphene N-Acetaminophen] Unknown (comments)    Hydrocodone Unknown (comments)    Myrbetriq [Mirabegron] Myalgia     Reports leg cramps from the medication. Current Outpatient Prescriptions on File Prior to Visit   Medication Sig Dispense Refill    CALCIUM CARBONATE (CALCIUM 600 PO) Take 600 mg by mouth two (2) times a day.  loperamide (IMODIUM) 2 mg capsule Take  by mouth.  cholecalciferol (VITAMIN D3) 1,000 unit tablet Take 1 Tab by mouth daily. Indications: OSTEOPOROSIS 30 Tab 5    atenolol (TENORMIN) 100 mg tablet Take 1 Tab by mouth daily. 90 Tab 3    clotrimazole-betamethasone (LOTRISONE) topical cream Apply twice daily to affected area x 10 days. 15 g 0    carbamide peroxide (DEBROX) 6.5 % otic solution Administer 5 Drops in right ear two (2) times a week. 150 mL 1    simvastatin (ZOCOR) 10 mg tablet Take 1 Tab by mouth nightly. Indications: HYPERLIPIDEMIA 90 Tab 1    hydrochlorothiazide (HYDRODIURIL) 12.5 mg tablet Take 1 Tab by mouth daily. 90 Tab 1    acetaminophen (TYLENOL) 325 mg tablet Take  by mouth every four (4) hours as needed for Pain.  peg 400-propylene glycol (SYSTANE) 0.4-0.3 % drop Administer 1 Drop to both eyes four (4) times daily.  amoxicillin (AMOXIL) 500 mg capsule Take 500 mg by mouth.  Take 4 caps one hour prior to dental appointments. Indications: prophylactic      metronidazole (METROLOTION) 0.75 % lotion Apply  to affected area nightly. (Patient taking differently: Apply  to affected area every morning. Indications: ACNE ROSACEA) 59 mL 1    aspirin 81 mg tablet Take 81 mg by mouth daily. No current facility-administered medications on file prior to visit.        Past Medical History:   Diagnosis Date    Allergic rhinitis     Arthritis     Benign hematuria     negative w/u by Dr. Doug Colin    Hematuria     Hypertension     Osteoarthritis     Osteopenia     Overactive bladder     Rosacea     Rosacea conjunctivitis(372.31)     Shingles     Stress incontinence       Past Surgical History:   Procedure Laterality Date    ENDOSCOPY, COLON, DIAGNOSTIC  7/2005    (Mikey Nunes)    HX CATARACT REMOVAL  7/2004    bilateral    HX ORTHOPAEDIC      toe surgery for bone spurs    HX IRENE AND BSO  1980    TOTAL HIP ARTHROPLASTY  2000    left    TOTAL HIP ARTHROPLASTY  2001    right      Social History   Substance Use Topics    Smoking status: Never Smoker    Smokeless tobacco: Never Used    Alcohol use No      Family History   Problem Relation Age of Onset    Stroke Mother      x2    Heart Attack Mother 61    Parkinsonism Father     Breast Cancer Sister     Hypertension Sister     Arthritis-osteo Sister     Heart defect Sister      MVP    Stroke Sister     Arthritis-osteo Sister     Hypertension Sister     Diabetes Sister     Breast Cancer Sister     Other Sister      polycythemia vera    Allergic Rhinitis Son     Hypertension Son     Headache Son     Allergic Rhinitis Daughter     Allergic Rhinitis Son         Subjective:     Lab Results   Component Value Date/Time    WBC 6.0 10/18/2017 12:20 PM    HGB 13.5 10/18/2017 12:20 PM    HCT 40.4 10/18/2017 12:20 PM    PLATELET 982 77/31/3373 12:20 PM     Lab Results   Component Value Date/Time    ALT (SGPT) 27 10/18/2017 12:20 PM    AST (SGOT) 44 10/18/2017 12:20 PM    Creatinine 0.71 10/18/2017 12:20 PM    BUN 19 10/18/2017 12:20 PM    Sodium 140 10/18/2017 12:20 PM    Potassium 4.4 10/18/2017 12:20 PM     Lab Results   Component Value Date/Time    Cholesterol, total 161 10/18/2017 12:20 PM    HDL Cholesterol 71 10/18/2017 12:20 PM    LDL, calculated 69 10/18/2017 12:20 PM    Triglyceride 106 10/18/2017 12:20 PM    TSH 2.240 10/18/2017 12:20 PM    Hemoglobin A1c 5.9 10/18/2017 12:20 PM       Objective:     Vitals:    12/22/17 1116   BP: 156/78   Pulse: 78   Resp: 20   Temp: 98.7 °F (37.1 °C)   TempSrc: Oral   SpO2: 98%   Weight: 112 lb 4.8 oz (50.9 kg)   Height: 5' 1\" (1.549 m)     PHQ over the last two weeks 12/22/2017   PHQ Not Done -   Little interest or pleasure in doing things Several days   Feeling down, depressed or hopeless More than half the days   Total Score PHQ 2 3   Trouble falling or staying asleep, or sleeping too much Not at all   Feeling tired or having little energy Several days   Poor appetite or overeating Several days   Feeling bad about yourself - or that you are a failure or have let yourself or your family down Several days   Trouble concentrating on things such as school, work, reading or watching TV Not at all   Moving or speaking so slowly that other people could have noticed; or the opposite being so fidgety that others notice Several days   Thoughts of being better off dead, or hurting yourself in some way Not at all   PHQ 9 Score 7   How difficult have these problems made it for you to do your work, take care of your home and get along with others Somewhat difficult       Review of Systems   Constitutional: Positive for activity change and fatigue. Negative for appetite change and fever. HENT: Positive for hearing loss. Negative for congestion, dental problem, postnasal drip, sinus pressure, sneezing, sore throat and trouble swallowing. Eyes: Negative for discharge, redness and visual disturbance.    Respiratory: Negative for apnea, cough, chest tightness, shortness of breath and wheezing. Cardiovascular: Negative for chest pain, palpitations and leg swelling. Gastrointestinal: Positive for diarrhea. Negative for abdominal distention, abdominal pain, blood in stool, constipation, nausea and vomiting. Endocrine: Negative for polydipsia, polyphagia and polyuria. Genitourinary: Negative for flank pain, frequency and urgency. Musculoskeletal: Positive for back pain. Negative for arthralgias, gait problem, joint swelling and neck pain. Skin: Negative for color change, pallor, rash and wound. Allergic/Immunologic: Negative for environmental allergies and food allergies. Neurological: Negative for dizziness, tremors, weakness, light-headedness, numbness and headaches. Hematological: Negative for adenopathy. Psychiatric/Behavioral: Negative for agitation, confusion and sleep disturbance. The patient is not nervous/anxious. Physical Exam   Constitutional: She is oriented to person, place, and time and well-developed, well-nourished, and in no distress. Vital signs are normal. She appears to not be writhing in pain, not malnourished and not dehydrated. She appears unhealthy. She does not have a sickly appearance. No distress. Average size, elderly  female, alert and oriented x 3 but difficulty remembering details and facts. Dry, labile, depressed effect today. In no apparent acute distress. HENT:   Head: Normocephalic and atraumatic. Right Ear: Tympanic membrane, external ear and ear canal normal. Decreased hearing is noted. Left Ear: Tympanic membrane, external ear and ear canal normal. Decreased hearing is noted. Nose: Nose normal. No mucosal edema or rhinorrhea. Mouth/Throat: Uvula is midline, oropharynx is clear and moist and mucous membranes are normal. Mucous membranes are not pale, not dry and not cyanotic. No oral lesions. No uvula swelling.  No posterior oropharyngeal edema or posterior oropharyngeal erythema. Eyes: Conjunctivae, EOM and lids are normal. Pupils are equal, round, and reactive to light. Lids are everted and swept, no foreign bodies found. Neck: Trachea normal, normal range of motion and full passive range of motion without pain. Neck supple. No hepatojugular reflux and no JVD present. Carotid bruit is not present. No thyromegaly present. Cardiovascular: Normal rate, regular rhythm, S1 normal, S2 normal, normal heart sounds, intact distal pulses and normal pulses. Occasional extrasystoles are present. Exam reveals no gallop and no friction rub. No murmur heard. Bilateral lower extremity edema    Pulmonary/Chest: Effort normal and breath sounds normal.   Abdominal: Soft. Normal appearance and bowel sounds are normal. She exhibits no distension, no fluid wave and no mass. There is no hepatosplenomegaly. There is no tenderness. There is no rebound and no CVA tenderness. Musculoskeletal: Normal range of motion. Lymphadenopathy:        Head (right side): No submandibular adenopathy present. Head (left side): No submandibular and no tonsillar adenopathy present. She has no cervical adenopathy. She has no axillary adenopathy. Neurological: She is alert and oriented to person, place, and time. She has normal motor skills, normal sensation, normal strength, normal reflexes and intact cranial nerves. She displays no weakness. She exhibits normal muscle tone. Gait normal. Gait normal.   Skin: Skin is warm, dry and intact. No bruising, no ecchymosis and no rash noted. She is not diaphoretic. No cyanosis. No pallor. Nails show no clubbing.    Psychiatric: Mood, memory, affect and judgment normal.   Baseline mood and affect unchanged from normal.      Assessment/ Plan:   During today's visit the following medications or treatments were requested to be discontinued by the provider :   Medications Discontinued During This Encounter   Medication Reason    mirabegron ER (MYRBETRIQ) 25 mg ER tablet Other     Diagnoses and all orders for this visit:    1. OAB (overactive bladder) - Ms. Corina Jackson does not want to try any other mediations for the overactive bladder as the Myrbetriq gave her leg cramps. She is in agreement that we will leave it alone and she will live the OAB. 2. Anticipatory anxiety    3. Functional diarrhea - continue on the Imodium daily and as needed. Ms. Corina Jackson does not want to go to the GI dr either. So we will treat the diarrhea here with the options I have. 4. Conflicted attitude towards medication management    5. Reactive depression - Discussed with resident that her cognitive functions are decreasing, today she had symptoms of depression, I screened her and noted she fell into the moderate category for depression, discussed treatment options and plan of care. Ms. Corina Jackson states she does not believe she has depression and does not want to be treated for it. I have educated her on the importance of Mind, Body, and Health. She states she is aware of that and will keep it in mind if she changes her opinion. I have personally discussed the diagnosis with the patient and the intended plan of care as seen in the above orders. The patient has received an after-visit summary unless they declined the option. Myself or my staff have given time for the patient to ask questions concerning future plans of care. I have discussed medication side effects and warnings with the patient as well. Follow-up Disposition:  Return in about 3 months (around 3/22/2018) for routine follow up care with co-morbidities and labs.

## 2017-12-22 NOTE — PATIENT INSTRUCTIONS
Learning About Depression  What is depression? Depression is an illness that affects how a person feels, thinks, and acts. It's different from normal feelings of sadness or grief. A person who has depression may have less energy. He or she may lose interest in daily activities and may feel sad and grouchy for a long time. Depression is a common illness. It affects men and women of all ages and backgrounds. Many people, and sometimes their families, feel embarrassed or ashamed about having depression. But it isn't a sign of personal weakness. It's not a character flaw. A person who is depressed is not \"crazy. \" Depression is a medical illness. It's caused by changes in the natural chemicals in the brain. Most experts believe that a combination of family history (a person's genes) and stressful life events can cause depression. Health problems may also cause depression or make it worse. It's common for people with long-term (chronic) health problems like coronary artery disease, diabetes, cancer, or chronic pain to feel depressed. It's important to know that depression can be treated. The first step toward feeling better is often just seeing that the problem exists. What are the symptoms? The symptoms of depression may be hard to notice at first. They vary among people, and it's easy to confuse them with just feeling \"off. \" The two most common symptoms are:  · Feeling sad or hopeless nearly every day for at least 2 weeks. · Losing interest in or not getting pleasure from most activities that used to be enjoyable, and feeling this way nearly every day for at least 2 weeks. Other symptoms may appear. A person with depression may, almost every day:  · Eat or sleep more or less than usual.  · Feel tired. · Feel unworthy or guilty. · Find it hard to focus, remember things, or make decisions. A serious symptom of depression is thinking about death or suicide.  If you or someone you care about talks about this or about feeling hopeless, get help right away. How is it treated? Doctors usually treat depression with medicines or counseling. Often a combination of the two works best. Many people don't get help because they think that they'll get over the depression on their own. But some people do not get better without treatment. Antidepressant medicines can improve the symptoms of depression in 1 to 3 weeks. But it can take 6 to 8 weeks to see more improvement. Your doctor will likely have you keep taking these medicines for at least 6 months. In many cases, counseling can work as well as medicines to treat mild to moderate depression. Counseling is done by licensed mental health providers, such as psychologists and social workers. This kind of treatment deals with how you think about things and how you act each day. If depression is caused by a medical problem, treating that problem may also help relieve the depression. What can you do to help someone with depression? If someone you care about is depressed, you may feel helpless. But there are some things you can do. · Help the person get treatment or stay with it. This is the best thing you can do. · Support and encourage the person. · Help the person have good health habits. Urge him or her to get regular exercise, eat a balanced diet, and get enough sleep. · Take care of yourself. Ask others to give you emotional and practical support while you are helping a friend or loved one who has depression. · Keep the numbers for these national suicide hotlines: 2-708-528-TALK (1-529-665-111-762-5578) and 7-114-YLXKGDK (7-260-202-903.772.6908). If you or someone you know talks about suicide or feeling hopeless, get help right away. Where can you learn more? Go to http://andriy-francisco.info/. Enter G446 in the search box to learn more about \"Learning About Depression. \"  Current as of: May 12, 2017  Content Version: 11.4  © 7313-5985 [employer]. Care instructions adapted under license by [employer] (which disclaims liability or warranty for this information). If you have questions about a medical condition or this instruction, always ask your healthcare professional. [address] any warranty or liability for your use of this information.

## 2017-12-22 NOTE — PROGRESS NOTES
Visit Vitals    /78 (BP 1 Location: Right arm, BP Patient Position: Sitting)    Pulse 78    Temp 98.7 °F (37.1 °C) (Oral)    Resp 20    Ht 5' 1\" (1.549 m)    Wt 112 lb 4.8 oz (50.9 kg)    SpO2 98%    BMI 21.22 kg/m2      Chief Complaint   Patient presents with    Stool Color Change     Patient reports that she bowel movements are better.  Urinary Frequency     Patient states that after starting the myrebtriq , she started with  leg cramps, she stopped the medication and the leg cramps stopped.

## 2018-01-11 ENCOUNTER — TELEPHONE (OUTPATIENT)
Dept: GERIATRIC MEDICINE | Age: 83
End: 2018-01-11

## 2018-01-11 NOTE — TELEPHONE ENCOUNTER
I spoke with the patient on the phone in regards to her appointment January 12, 2018. Pt stated that she will be there on time.

## 2018-01-12 ENCOUNTER — TELEPHONE (OUTPATIENT)
Dept: GERIATRIC MEDICINE | Age: 83
End: 2018-01-12

## 2018-01-12 ENCOUNTER — OFFICE VISIT (OUTPATIENT)
Dept: GERIATRIC MEDICINE | Age: 83
End: 2018-01-12

## 2018-01-12 VITALS
HEART RATE: 76 BPM | DIASTOLIC BLOOD PRESSURE: 52 MMHG | HEIGHT: 61 IN | SYSTOLIC BLOOD PRESSURE: 128 MMHG | OXYGEN SATURATION: 98 % | RESPIRATION RATE: 20 BRPM | BODY MASS INDEX: 20.58 KG/M2 | TEMPERATURE: 98.7 F | WEIGHT: 109 LBS

## 2018-01-12 DIAGNOSIS — J06.0 SORE THROAT AND LARYNGITIS: ICD-10-CM

## 2018-01-12 DIAGNOSIS — J30.9 CHRONIC ALLERGIC RHINITIS, UNSPECIFIED SEASONALITY, UNSPECIFIED TRIGGER: ICD-10-CM

## 2018-01-12 DIAGNOSIS — R53.1 WEAKNESS GENERALIZED: ICD-10-CM

## 2018-01-12 DIAGNOSIS — J01.40 ACUTE NON-RECURRENT PANSINUSITIS: Primary | ICD-10-CM

## 2018-01-12 DIAGNOSIS — R09.82 POST-NASAL DRIP: ICD-10-CM

## 2018-01-12 RX ORDER — MONTELUKAST SODIUM 10 MG/1
10 TABLET ORAL DAILY
Qty: 30 TAB | Refills: 2 | Status: SHIPPED | OUTPATIENT
Start: 2018-01-12 | End: 2018-10-16

## 2018-01-12 RX ORDER — CLARITHROMYCIN 250 MG/1
250 TABLET, FILM COATED ORAL 2 TIMES DAILY
Qty: 14 TAB | Refills: 0 | Status: SHIPPED | OUTPATIENT
Start: 2018-01-12 | End: 2018-01-19

## 2018-01-12 NOTE — PROGRESS NOTES
Visit Vitals    /52 (BP 1 Location: Left arm, BP Patient Position: Sitting)    Pulse 76    Temp 98.7 °F (37.1 °C) (Oral)    Resp 20    Ht 5' 1\" (1.549 m)    Wt 109 lb (49.4 kg)    SpO2 98%    BMI 20.6 kg/m2      Chief Complaint   Patient presents with   [facility]     Patient here for sore throat and dry cough for about a week.

## 2018-01-12 NOTE — MR AVS SNAPSHOT
Visit Information Date & Time Provider Department Dept. Phone Encounter #  
 1/12/2018 11:00 AM Donavon Jesus, [address] [facility] [phone] [id number] Follow-up Instructions Return in about 3 days (around 1/15/2018) for follow up to care provided today. Ginny Marin Follow-up and Disposition History Your Appointments 1/15/2018 11:30 AM  
ROUTINE CARE with Donavon Jesus, MARCELINA [address] [facility] ([address]) Appt Note: Pt f/u from sinuses. [address]  
  
   
 [address] [id number] Upcoming Health Maintenance Date Due  
 MEDICARE YEARLY EXAM 3/25/2018 GLAUCOMA SCREENING Q2Y 1/23/2019 DTaP/Tdap/Td series (2 - Td) 10/18/2027 Allergies as of 1/12/2018  Review Complete On: 1/12/2018 By: To Achilles Severity Noted Reaction Type Reactions Latex  05/17/2016    Rash Orabase (Benzocaine) [Benzocaine] High 05/17/2016    Anaphylaxis Adhesive  05/17/2016    Rash Codeine  09/14/2011    Nausea and Vomiting Blondell Nova [Propoxyphene N-acetaminophen]  09/14/2011    Unknown (comments) Hydrocodone  09/14/2011    Unknown (comments) Myrbetriq [Mirabegron] Low 12/22/2017    Myalgia Reports leg cramps from the medication. Current Immunizations  Reviewed on 9/30/2011 Name Date Influenza Vaccine 10/1/2016, 10/1/2015 Influenza Vaccine Whole 9/29/2011 Pneumococcal Conjugate (PCV-13) 7/30/2015 Pneumococcal Polysaccharide (PPSV-23) 9/1/2008 Pneumococcal Vaccine (Unspecified Type) 10/1/1998 Td 9/30/2009 ZZZ-RETIRED (DO NOT USE) Pneumococcal Vaccine (Unspecified Type) 9/1/2008, 10/1/1998 Zoster Vaccine, Live 5/11/2012 Not reviewed this visit You Were Diagnosed With   
  
 Codes Comments Acute non-recurrent pansinusitis    -  Primary ICD-10-CM: J01.40 ICD-9-CM: 461.8 Chronic allergic rhinitis, unspecified seasonality, unspecified trigger     ICD-10-CM: J30.9 ICD-9-CM: 477.9 Post-nasal drip     ICD-10-CM: R09.82 ICD-9-CM: 784.91 Weakness generalized     ICD-10-CM: R53.1 ICD-9-CM: 780.79 Sore throat and laryngitis     ICD-10-CM: J06.0 ICD-9-CM: 465.0 Vitals BP Pulse Temp Resp Height(growth percentile) Weight(growth percentile) 128/52 (BP 1 Location: Left arm, BP Patient Position: Sitting) 76 98.7 °F (37.1 °C) (Oral) 20 5' 1\" (1.549 m) 109 lb (49.4 kg) SpO2 BMI OB Status Smoking Status 98% 20.6 kg/m2 Hysterectomy Never Smoker Vitals History BMI and BSA Data Body Mass Index Body Surface Area  
 20.6 kg/m 2 1.46 m 2 Preferred Pharmacy Pharmacy Name Phone [address] [place] [address] [phone] Your Updated Medication List  
  
   
This list is accurate as of: 1/12/18  6:51 PM.  Always use your most recent med list.  
  
  
  
  
 acetaminophen 325 mg tablet Commonly known as:  TYLENOL Take  by mouth every four (4) hours as needed for Pain.  
  
 amoxicillin 500 mg capsule Commonly known as:  AMOXIL Take 500 mg by mouth. Take 4 caps one hour prior to dental appointments. Indications: prophylactic  
  
 aspirin 81 mg tablet Take 81 mg by mouth daily. atenolol 100 mg tablet Commonly known as:  TENORMIN Take 1 Tab by mouth daily. CALCIUM 600 PO Take 600 mg by mouth two (2) times a day. carbamide peroxide 6.5 % otic solution Commonly known as:  Maylon Coho Administer 5 Drops in right ear two (2) times a week. cholecalciferol 1,000 unit tablet Commonly known as:  VITAMIN D3 Take 1 Tab by mouth daily. Indications: OSTEOPOROSIS  
  
 clarithromycin 250 mg tablet Commonly known as:  Samia John Take 1 Tab by mouth two (2) times a day for 7 days. Indications: ACUTE BACTERIAL MAXILLARY SINUSITIS clotrimazole-betamethasone topical cream  
Commonly known as:  [place] Stake Apply twice daily to affected area x 10 days. hydroCHLOROthiazide 12.5 mg tablet Commonly known as:  HYDRODIURIL Take 1 Tab by mouth daily. loperamide 2 mg capsule Commonly known as:  IMODIUM Take  by mouth.  
  
 metroNIDAZOLE 0.75 % lotion Commonly known as:  [place] Jamey Apply  to affected area nightly. montelukast 10 mg tablet Commonly known as:  SINGULAIR Take 1 Tab by mouth daily. Indications: Allergic Rhinitis  
  
 peg 400-propylene glycol 0.4-0.3 % Drop Commonly known as:  SYSTANE Administer 1 Drop to both eyes four (4) times daily. simvastatin 10 mg tablet Commonly known as:  ZOCOR Take 1 Tab by mouth nightly. Indications: HYPERLIPIDEMIA Prescriptions Sent to Pharmacy Refills  
 montelukast (SINGULAIR) 10 mg tablet 2 Sig: Take 1 Tab by mouth daily. Indications: Allergic Rhinitis Class: Normal  
 Pharmacy: [address] [place] [address] Ph #: [phone] Route: Oral  
 clarithromycin (BIAXIN) 250 mg tablet 0 Sig: Take 1 Tab by mouth two (2) times a day for 7 days. Indications: ACUTE BACTERIAL MAXILLARY SINUSITIS Class: Normal  
 Pharmacy: [address] [place] [address] Ph #: [phone] Route: Oral  
  
Follow-up Instructions Return in about 3 days (around 1/15/2018) for follow up to care provided today. Ginny Marin Patient Instructions Upper Respiratory Infection (Cold): Care Instructions Your Care Instructions An upper respiratory infection, or URI, is an infection of the nose, sinuses, or throat. URIs are spread by coughs, sneezes, and direct contact. The common cold is the most frequent kind of URI. The flu and sinus infections are other kinds of URIs. Almost all URIs are caused by viruses. Antibiotics won't cure them.  But you can treat most infections with home care. This may include drinking lots of fluids and taking over-the-counter pain medicine. You will probably feel better in 4 to 10 days. The doctor has checked you carefully, but problems can develop later. If you notice any problems or new symptoms, get medical treatment right away. Follow-up care is a key part of your treatment and safety. Be sure to make and go to all appointments, and call your doctor if you are having problems. It's also a good idea to know your test results and keep a list of the medicines you take. How can you care for yourself at home? · To prevent dehydration, drink plenty of fluids, enough so that your urine is light yellow or clear like water. Choose water and other caffeine-free clear liquids until you feel better. If you have kidney, heart, or liver disease and have to limit fluids, talk with your doctor before you increase the amount of fluids you drink. · Take an over-the-counter pain medicine, such as acetaminophen (Tylenol), ibuprofen (Advil, Motrin), or naproxen (Aleve). Read and follow all instructions on the label. · Before you use cough and cold medicines, check the label. These medicines may not be safe for young children or for people with certain health problems. · Be careful when taking over-the-counter cold or flu medicines and Tylenol at the same time. Many of these medicines have acetaminophen, which is Tylenol. Read the labels to make sure that you are not taking more than the recommended dose. Too much acetaminophen (Tylenol) can be harmful. · Get plenty of rest. 
· Do not smoke or allow others to smoke around you. If you need help quitting, talk to your doctor about stop-smoking programs and medicines. These can increase your chances of quitting for good. When should you call for help? Call 911 anytime you think you may need emergency care. For example, call if: 
? · You have severe trouble breathing. ?Call your doctor now or seek immediate medical care if: 
? · You seem to be getting much sicker. ? · You have new or worse trouble breathing. ? · You have a new or higher fever. ? · You have a new rash. ? Watch closely for changes in your health, and be sure to contact your doctor if: 
? · You have a new symptom, such as a sore throat, an earache, or sinus pain. ? · You cough more deeply or more often, especially if you notice more mucus or a change in the color of your mucus. ? · You do not get better as expected. Where can you learn more? Go to http://andriy-francisco.info/. Enter K444 in the search box to learn more about \"Upper Respiratory Infection (Cold): Care Instructions. \" Current as of: May 12, 2017 Content Version: 11.4 © 2656-9192 [employer]. Care instructions adapted under license by [employer] (which disclaims liability or warranty for this information). If you have questions about a medical condition or this instruction, always ask your healthcare professional. [place] [address] any warranty or liability for your use of this information. Sore Throat: Care Instructions Your Care Instructions Infection by bacteria or a virus causes most sore throats. Cigarette smoke, dry air, air pollution, allergies, and yelling can also cause a sore throat. Sore throats can be painful and annoying. Fortunately, most sore throats go away on their own. If you have a bacterial infection, your doctor may prescribe antibiotics. Follow-up care is a key part of your treatment and safety. Be sure to make and go to all appointments, and call your doctor if you are having problems. It's also a good idea to know your test results and keep a list of the medicines you take. How can you care for yourself at home? · If your doctor prescribed antibiotics, take them as directed.  Do not stop taking them just because you feel better. You need to take the full course of antibiotics. · Gargle with warm salt water once an hour to help reduce swelling and relieve discomfort. Use 1 teaspoon of salt mixed in 1 cup of warm water. · Take an over-the-counter pain medicine, such as acetaminophen (Tylenol), ibuprofen (Advil, Motrin), or naproxen (Aleve). Read and follow all instructions on the label. · Be careful when taking over-the-counter cold or flu medicines and Tylenol at the same time. Many of these medicines have acetaminophen, which is Tylenol. Read the labels to make sure that you are not taking more than the recommended dose. Too much acetaminophen (Tylenol) can be harmful. · Drink plenty of fluids. Fluids may help soothe an irritated throat. Hot fluids, such as tea or soup, may help decrease throat pain. · Use over-the-counter throat lozenges to soothe pain. Regular cough drops or hard candy may also help. These should not be given to young children because of the risk of choking. · Do not smoke or allow others to smoke around you. If you need help quitting, talk to your doctor about stop-smoking programs and medicines. These can increase your chances of quitting for good. · Use a vaporizer or humidifier to add moisture to your bedroom. Follow the directions for cleaning the machine. When should you call for help? Call your doctor now or seek immediate medical care if: 
? · You have new or worse trouble swallowing. ? · Your sore throat gets much worse on one side. ? Watch closely for changes in your health, and be sure to contact your doctor if you do not get better as expected. Where can you learn more? Go to http://andriy-francisco.info/. Enter [id number] [id number] in the search box to learn more about \"Sore Throat: Care Instructions. \" Current as of: May 12, 2017 Content Version: 11.4 © 1092-3121 [employer].  Care instructions adapted under license by [address] (which disclaims liability or warranty for this information). If you have questions about a medical condition or this instruction, always ask your healthcare professional. [address] any warranty or liability for your use of this information. Sinusitis: Care Instructions Your Care Instructions Sinusitis is an infection of the lining of the sinus cavities in your head. Sinusitis often follows a cold. It causes pain and pressure in your head and face. In most cases, sinusitis gets better on its own in 1 to 2 weeks. But some mild symptoms may last for several weeks. Sometimes antibiotics are needed. Follow-up care is a key part of your treatment and safety. Be sure to make and go to all appointments, and call your doctor if you are having problems. It's also a good idea to know your test results and keep a list of the medicines you take. How can you care for yourself at home? · Take an over-the-counter pain medicine, such as acetaminophen (Tylenol), ibuprofen (Advil, Motrin), or naproxen (Aleve). Read and follow all instructions on the label. · If the doctor prescribed antibiotics, take them as directed. Do not stop taking them just because you feel better. You need to take the full course of antibiotics. · Be careful when taking over-the-counter cold or flu medicines and Tylenol at the same time. Many of these medicines have acetaminophen, which is Tylenol. Read the labels to make sure that you are not taking more than the recommended dose. Too much acetaminophen (Tylenol) can be harmful. · Breathe warm, moist air from a steamy shower, a hot bath, or a sink filled with hot water. Avoid cold, dry air. Using a humidifier in your home may help. Follow the directions for cleaning the machine. · Use saline (saltwater) nasal washes to help keep your nasal passages open and wash out mucus and bacteria.  You can buy saline nose drops at a grocery store or drugstore. Or you can make your own at home by adding 1 teaspoon of salt and 1 teaspoon of baking soda to 2 cups of distilled water. If you make your own, fill a bulb syringe with the solution, insert the tip into your nostril, and squeeze gently. [place] your nose. · Put a hot, wet towel or a warm gel pack on your face 3 or 4 times a day for 5 to 10 minutes each time. · Try a decongestant nasal spray like oxymetazoline (Afrin). Do not use it for more than 3 days in a row. Using it for more than 3 days can make your congestion worse. When should you call for help? Call your doctor now or seek immediate medical care if: 
? · You have new or worse swelling or redness in your face or around your eyes. ? · You have a new or higher fever. ? Watch closely for changes in your health, and be sure to contact your doctor if: 
? · You have new or worse facial pain. ? · The mucus from your nose becomes thicker (like pus) or has new blood in it. ? · You are not getting better as expected. Where can you learn more? Go to http://andriy-francisco.info/. Enter Z906 in the search box to learn more about \"Sinusitis: Care Instructions. \" Current as of: May 12, 2017 Content Version: 11.4 © 6937-3753 [employer]. Care instructions adapted under license by [employer] (which disclaims liability or warranty for this information). If you have questions about a medical condition or this instruction, always ask your healthcare professional. [place] [address] any warranty or liability for your use of this information. Patient Instructions History Introducing \[facility] [place] [facility]\"" & HEALTH SERVICES! Kathleen Larson introduces [employer] patient portal. Now you can access parts of your medical record, email your doctor's office, and request medication refills online. 1. In your internet browser, go to [email or web address] 2. Click on the First Time User? Click Here link in the Sign In box. You will see the New Member Sign Up page. 3. Enter your Keldelice Access Code exactly as it appears below. You will not need to use this code after youve completed the sign-up process. If you do not sign up before the expiration date, you must request a new code. · Keldelice Access Code: [email or web address] Expires: 1/16/2018 10:03 AM 
 
4. Enter the last four digits of your Social Security Number (xxxx) and Date of Birth (mm/dd/yyyy) as indicated and click Submit. You will be taken to the next sign-up page. 5. Create a Keldelice ID. This will be your Keldelice login ID and cannot be changed, so think of one that is secure and easy to remember. 6. Create a Keldelice password. You can change your password at any time. 7. Enter your Password Reset Question and Answer. This can be used at a later time if you forget your password. 8. Enter your e-mail address. You will receive e-mail notification when new information is available in [address] 9. Click Sign Up. You can now view and download portions of your medical record. 10. Click the Download Summary menu link to download a portable copy of your medical information. If you have questions, please visit the Frequently Asked Questions section of the Keldelice website. Remember, Kel[employer]ice is NOT to be used for urgent needs. For medical emergencies, dial 911. Now available from your iPhone and Android! Please provide this summary of care documentation to your next provider. Your primary care clinician is listed as Jeffery Ferguson. If you have any questions after today's visit, please call [phone].

## 2018-01-12 NOTE — PROGRESS NOTES
Reason for Visit:      Chief Complaint   Patient presents with   [facility]     Patient here for sore throat and dry cough for about a week. 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 This all started on Monday or Tuesday this week and has progressed to gnawing headache, cloudy thoughts and increasing fatigue. Diagnosis/Plan of Care:   During today's visit the following medications or treatments were requested to be discontinued by the provider :   There are no discontinued medications. Diagnoses and all orders for this visit:    1. Acute non-recurrent pansinusitis  -     montelukast (SINGULAIR) 10 mg tablet; Take 1 Tab by mouth daily. Indications: Allergic Rhinitis  -     clarithromycin (BIAXIN) 250 mg tablet; Take 1 Tab by mouth two (2) times a day for 7 days. Indications: ACUTE BACTERIAL MAXILLARY SINUSITIS    2. Chronic allergic rhinitis, unspecified seasonality, unspecified trigger  -     montelukast (SINGULAIR) 10 mg tablet; Take 1 Tab by mouth daily. Indications: Allergic Rhinitis  -     clarithromycin (BIAXIN) 250 mg tablet; Take 1 Tab by mouth two (2) times a day for 7 days. Indications: ACUTE BACTERIAL MAXILLARY SINUSITIS    3. Post-nasal drip  -     montelukast (SINGULAIR) 10 mg tablet; Take 1 Tab by mouth daily. Indications: Allergic Rhinitis  -     clarithromycin (BIAXIN) 250 mg tablet; Take 1 Tab by mouth two (2) times a day for 7 days. Indications: ACUTE BACTERIAL MAXILLARY SINUSITIS    4. Weakness generalized  -     montelukast (SINGULAIR) 10 mg tablet; Take 1 Tab by mouth daily. Indications: Allergic Rhinitis  -     clarithromycin (BIAXIN) 250 mg tablet; Take 1 Tab by mouth two (2) times a day for 7 days. Indications: ACUTE BACTERIAL MAXILLARY SINUSITIS    5.  Sore throat and laryngitis  -     montelukast (SINGULAIR) 10 mg tablet; Take 1 Tab by mouth daily. Indications: Allergic Rhinitis  -     clarithromycin (BIAXIN) 250 mg tablet; Take 1 Tab by mouth two (2) times a day for 7 days. Indications: ACUTE BACTERIAL MAXILLARY SINUSITIS      Follow-up Disposition:  Return in about 3 days (around 1/15/2018) for follow up to care provided today. .    Subjective: Allergies   Allergen Reactions    Latex Rash    Orabase (Benzocaine) [Benzocaine] Anaphylaxis    Adhesive Rash    Codeine Nausea and Vomiting    Darvocet A500 [Propoxyphene N-Acetaminophen] Unknown (comments)    Hydrocodone Unknown (comments)    Myrbetriq [Mirabegron] Myalgia     Reports leg cramps from the medication. Current Outpatient Prescriptions on File Prior to Visit   Medication Sig Dispense Refill    CALCIUM CARBONATE (CALCIUM 600 PO) Take 600 mg by mouth two (2) times a day.  loperamide (IMODIUM) 2 mg capsule Take  by mouth.  cholecalciferol (VITAMIN D3) 1,000 unit tablet Take 1 Tab by mouth daily. Indications: OSTEOPOROSIS 30 Tab 5    atenolol (TENORMIN) 100 mg tablet Take 1 Tab by mouth daily. 90 Tab 3    clotrimazole-betamethasone (LOTRISONE) topical cream Apply twice daily to affected area x 10 days. 15 g 0    simvastatin (ZOCOR) 10 mg tablet Take 1 Tab by mouth nightly. Indications: HYPERLIPIDEMIA 90 Tab 1    hydrochlorothiazide (HYDRODIURIL) 12.5 mg tablet Take 1 Tab by mouth daily. 90 Tab 1    peg 400-propylene glycol (SYSTANE) 0.4-0.3 % drop Administer 1 Drop to both eyes four (4) times daily.  amoxicillin (AMOXIL) 500 mg capsule Take 500 mg by mouth. Take 4 caps one hour prior to dental appointments. Indications: prophylactic      metronidazole (METROLOTION) 0.75 % lotion Apply  to affected area nightly. (Patient taking differently: Apply  to affected area every morning. Indications: ACNE ROSACEA) 59 mL 1    aspirin 81 mg tablet Take 81 mg by mouth daily.       carbamide peroxide (DEBROX) 6.5 % otic solution Administer 5 Drops in right ear two (2) times a week. 150 mL 1    acetaminophen (TYLENOL) 325 mg tablet Take  by mouth every four (4) hours as needed for Pain. No current facility-administered medications on file prior to visit.        Past Medical History:   Diagnosis Date    Allergic rhinitis     Arthritis     Benign hematuria     negative w/u by Dr. Jermaine Gaffney Hematuria     Hypertension     Osteoarthritis     Osteopenia     Overactive bladder     Rosacea     Rosacea conjunctivitis(372.31)     Shingles     Stress incontinence       Past Surgical History:   Procedure Laterality Date    ENDOSCOPY, COLON, DIAGNOSTIC  7/2005    (Jovanny Green)    HX CATARACT REMOVAL  7/2004    bilateral    HX ORTHOPAEDIC      toe surgery for bone spurs    HX IRENE AND BSO  1980    TOTAL HIP ARTHROPLASTY  2000    left    TOTAL HIP ARTHROPLASTY  2001    right      Social History   Substance Use Topics    Smoking status: Never Smoker    Smokeless tobacco: Never Used    Alcohol use No      Family History   Problem Relation Age of Onset    Stroke Mother      x2    Heart Attack Mother 61    Parkinsonism Father     Breast Cancer Sister     Hypertension Sister     Arthritis-osteo Sister     Heart defect Sister      MVP    Stroke Sister     Arthritis-osteo Sister     Hypertension Sister     Diabetes Sister     Breast Cancer Sister     Other Sister      polycythemia vera    Allergic Rhinitis Son     Hypertension Son     Headache Son     Allergic Rhinitis Daughter     Allergic Rhinitis Son       Latest Laboratory Results:     Lab Results   Component Value Date/Time    WBC 6.0 10/18/2017 12:20 PM    HGB 13.5 10/18/2017 12:20 PM    HCT 40.4 10/18/2017 12:20 PM    PLATELET 185 07/65/9376 12:20 PM     Lab Results   Component Value Date/Time    ALT (SGPT) 27 10/18/2017 12:20 PM    AST (SGOT) 44 10/18/2017 12:20 PM    Creatinine 0.71 10/18/2017 12:20 PM    BUN 19 10/18/2017 12:20 PM    Sodium 140 10/18/2017 12:20 PM    Potassium 4.4 10/18/2017 12:20 PM     Lab Results   Component Value Date/Time    Cholesterol, total 161 10/18/2017 12:20 PM    HDL Cholesterol 71 10/18/2017 12:20 PM    LDL, calculated 69 10/18/2017 12:20 PM    Triglyceride 106 10/18/2017 12:20 PM    TSH 2.240 10/18/2017 12:20 PM    Hemoglobin A1c 5.9 10/18/2017 12:20 PM     Objective:     Vitals:    01/12/18 1055   BP: 128/52   Pulse: 76   Resp: 20   Temp: 98.7 °F (37.1 °C)   TempSrc: Oral   SpO2: 98%   Weight: 109 lb (49.4 kg)   Height: 5' 1\" (1.549 m)     Review of Systems   Constitutional: Positive for activity change, appetite change and fatigue. Negative for fever. HENT: Positive for hearing loss, postnasal drip, rhinorrhea, sinus pain, sinus pressure, sneezing, sore throat, trouble swallowing and voice change. Negative for congestion and dental problem. Eyes: Negative for discharge, redness and visual disturbance. Respiratory: Positive for cough. Negative for apnea, chest tightness, shortness of breath and wheezing. Cardiovascular: Negative for chest pain, palpitations and leg swelling. Gastrointestinal: Negative for abdominal distention, abdominal pain, blood in stool, constipation, diarrhea, nausea and vomiting. Endocrine: Negative for polydipsia, polyphagia and polyuria. Genitourinary: Negative for flank pain, frequency and urgency. Musculoskeletal: Negative for arthralgias, back pain, gait problem, joint swelling and neck pain. Skin: Negative for color change, pallor, rash and wound. Allergic/Immunologic: Negative for environmental allergies and food allergies. Neurological: Positive for headaches. Negative for dizziness, tremors, weakness, light-headedness and numbness. Hematological: Negative for adenopathy. Psychiatric/Behavioral: Negative for agitation, confusion and sleep disturbance. The patient is not nervous/anxious.       Physical Exam   Constitutional: She is oriented to person, place, and time and well-developed, well-nourished, and in no distress. Vital signs are normal. She appears lethargic. She appears to not be writhing in pain, not malnourished and not dehydrated. She appears healthy. She has a sickly appearance. No distress. Average size, elderly  female, alert and oriented x 3 but difficulty remembering details and facts. Dry, labile, depressed effect today. In no apparent acute distress. HENT:   Head: Normocephalic and atraumatic. Right Ear: Tympanic membrane, external ear and ear canal normal. Decreased hearing is noted. Left Ear: Tympanic membrane, external ear and ear canal normal. Decreased hearing is noted. Nose: Mucosal edema, rhinorrhea and sinus tenderness present. Right sinus exhibits frontal sinus tenderness. Left sinus exhibits frontal sinus tenderness. Mouth/Throat: Uvula is midline. Mucous membranes are pale, dry and not cyanotic. No oral lesions. No uvula swelling. Posterior oropharyngeal erythema present. No posterior oropharyngeal edema. Eyes: Conjunctivae, EOM and lids are normal. Pupils are equal, round, and reactive to light. Lids are everted and swept, no foreign bodies found. Neck: Trachea normal, normal range of motion and full passive range of motion without pain. Neck supple. No hepatojugular reflux and no JVD present. Carotid bruit is not present. No thyromegaly present. Cardiovascular: Normal rate, regular rhythm, S1 normal, S2 normal, normal heart sounds, intact distal pulses and normal pulses. Occasional extrasystoles are present. Exam reveals no gallop and no friction rub. No murmur heard. No lower extremity edema present today. Pulmonary/Chest: Effort normal and breath sounds normal.   Abdominal: Soft. Normal appearance and bowel sounds are normal. She exhibits no distension, no fluid wave and no mass. There is no hepatosplenomegaly. There is no tenderness. There is no rebound and no CVA tenderness. Musculoskeletal: Normal range of motion. Lymphadenopathy:        Head (right side): No submandibular adenopathy present. Head (left side): No submandibular and no tonsillar adenopathy present. She has no cervical adenopathy. She has no axillary adenopathy. Neurological: She is oriented to person, place, and time. She has normal motor skills, normal sensation, normal strength, normal reflexes and intact cranial nerves. She appears lethargic. She displays no weakness. She exhibits normal muscle tone. Gait normal. Gait normal.   Skin: Skin is warm, dry and intact. No bruising, no ecchymosis and no rash noted. She is not diaphoretic. No cyanosis. There is pallor. Nails show no clubbing. Psychiatric: Mood, memory, affect and judgment normal.   Baseline mood and affect unchanged from normal.      Disclaimer:   Advised Jamari Moore to call back or return to office if symptoms worsen/change/persist.  Discussed expected course/resolution/complications of diagnosis in detail with patient. Medication risks/benefits/costs/interactions/alternatives discussed with patient and she was given an after visit summary which includes diagnoses, current medications, & vitals. Karlosor Paulnatalie expressed understanding with the diagnosis and plan.

## 2018-01-12 NOTE — PATIENT INSTRUCTIONS
Upper Respiratory Infection (Cold): Care Instructions  Your Care Instructions    An upper respiratory infection, or URI, is an infection of the nose, sinuses, or throat. URIs are spread by coughs, sneezes, and direct contact. The common cold is the most frequent kind of URI. The flu and sinus infections are other kinds of URIs. Almost all URIs are caused by viruses. Antibiotics won't cure them. But you can treat most infections with home care. This may include drinking lots of fluids and taking over-the-counter pain medicine. You will probably feel better in 4 to 10 days. The doctor has checked you carefully, but problems can develop later. If you notice any problems or new symptoms, get medical treatment right away. Follow-up care is a key part of your treatment and safety. Be sure to make and go to all appointments, and call your doctor if you are having problems. It's also a good idea to know your test results and keep a list of the medicines you take. How can you care for yourself at home? · To prevent dehydration, drink plenty of fluids, enough so that your urine is light yellow or clear like water. Choose water and other caffeine-free clear liquids until you feel better. If you have kidney, heart, or liver disease and have to limit fluids, talk with your doctor before you increase the amount of fluids you drink. · Take an over-the-counter pain medicine, such as acetaminophen (Tylenol), ibuprofen (Advil, Motrin), or naproxen (Aleve). Read and follow all instructions on the label. · Before you use cough and cold medicines, check the label. These medicines may not be safe for young children or for people with certain health problems. · Be careful when taking over-the-counter cold or flu medicines and Tylenol at the same time. Many of these medicines have acetaminophen, which is Tylenol. Read the labels to make sure that you are not taking more than the recommended dose.  Too much acetaminophen (Tylenol) can be harmful. · Get plenty of rest.  · Do not smoke or allow others to smoke around you. If you need help quitting, talk to your doctor about stop-smoking programs and medicines. These can increase your chances of quitting for good. When should you call for help? Call 911 anytime you think you may need emergency care. For example, call if:  ? · You have severe trouble breathing. ?Call your doctor now or seek immediate medical care if:  ? · You seem to be getting much sicker. ? · You have new or worse trouble breathing. ? · You have a new or higher fever. ? · You have a new rash. ? Watch closely for changes in your health, and be sure to contact your doctor if:  ? · You have a new symptom, such as a sore throat, an earache, or sinus pain. ? · You cough more deeply or more often, especially if you notice more mucus or a change in the color of your mucus. ? · You do not get better as expected. Where can you learn more? Go to http://andriy-francisco.info/. Enter W796 in the search box to learn more about \"Upper Respiratory Infection (Cold): Care Instructions. \"  Current as of: May 12, 2017  Content Version: 11.4  © 5849-3400 [employer]. Care instructions adapted under license by [employer] (which disclaims liability or warranty for this information). If you have questions about a medical condition or this instruction, always ask your healthcare professional. [place] [address] any warranty or liability for your use of this information. Sore Throat: Care Instructions  Your Care Instructions    Infection by bacteria or a virus causes most sore throats. Cigarette smoke, dry air, air pollution, allergies, and yelling can also cause a sore throat. Sore throats can be painful and annoying. Fortunately, most sore throats go away on their own. If you have a bacterial infection, your doctor may prescribe antibiotics.   Follow-up care is a key part of your treatment and safety. Be sure to make and go to all appointments, and call your doctor if you are having problems. It's also a good idea to know your test results and keep a list of the medicines you take. How can you care for yourself at home? · If your doctor prescribed antibiotics, take them as directed. Do not stop taking them just because you feel better. You need to take the full course of antibiotics. · Gargle with warm salt water once an hour to help reduce swelling and relieve discomfort. Use 1 teaspoon of salt mixed in 1 cup of warm water. · Take an over-the-counter pain medicine, such as acetaminophen (Tylenol), ibuprofen (Advil, Motrin), or naproxen (Aleve). Read and follow all instructions on the label. · Be careful when taking over-the-counter cold or flu medicines and Tylenol at the same time. Many of these medicines have acetaminophen, which is Tylenol. Read the labels to make sure that you are not taking more than the recommended dose. Too much acetaminophen (Tylenol) can be harmful. · Drink plenty of fluids. Fluids may help soothe an irritated throat. Hot fluids, such as tea or soup, may help decrease throat pain. · Use over-the-counter throat lozenges to soothe pain. Regular cough drops or hard candy may also help. These should not be given to young children because of the risk of choking. · Do not smoke or allow others to smoke around you. If you need help quitting, talk to your doctor about stop-smoking programs and medicines. These can increase your chances of quitting for good. · Use a vaporizer or humidifier to add moisture to your bedroom. Follow the directions for cleaning the machine. When should you call for help? Call your doctor now or seek immediate medical care if:  ? · You have new or worse trouble swallowing. ? · Your sore throat gets much worse on one side. ? Watch closely for changes in your health, and be sure to contact your doctor if you do not get better as expected. Where can you learn more? Go to http://andriy-francisco.info/. Enter 062 [id number] 80 [id number] in the search box to learn more about \"Sore Throat: Care Instructions. \"  Current as of: May 12, 2017  Content Version: 11.4  © 4797-4121 [employer]. Care instructions adapted under license by [employer] (which disclaims liability or warranty for this information). If you have questions about a medical condition or this instruction, always ask your healthcare professional. [address] any warranty or liability for your use of this information. Sinusitis: Care Instructions  Your Care Instructions    Sinusitis is an infection of the lining of the sinus cavities in your head. Sinusitis often follows a cold. It causes pain and pressure in your head and face. In most cases, sinusitis gets better on its own in 1 to 2 weeks. But some mild symptoms may last for several weeks. Sometimes antibiotics are needed. Follow-up care is a key part of your treatment and safety. Be sure to make and go to all appointments, and call your doctor if you are having problems. It's also a good idea to know your test results and keep a list of the medicines you take. How can you care for yourself at home? · Take an over-the-counter pain medicine, such as acetaminophen (Tylenol), ibuprofen (Advil, Motrin), or naproxen (Aleve). Read and follow all instructions on the label. · If the doctor prescribed antibiotics, take them as directed. Do not stop taking them just because you feel better. You need to take the full course of antibiotics. · Be careful when taking over-the-counter cold or flu medicines and Tylenol at the same time. Many of these medicines have acetaminophen, which is Tylenol. Read the labels to make sure that you are not taking more than the recommended dose. Too much acetaminophen (Tylenol) can be harmful.   · Breathe warm, moist air from a steamy shower, a hot bath, or a sink filled with hot water. Avoid cold, dry air. Using a humidifier in your home may help. Follow the directions for cleaning the machine. · Use saline (saltwater) nasal washes to help keep your nasal passages open and wash out mucus and bacteria. You can buy saline nose drops at a grocery store or drugstore. Or you can make your own at home by adding 1 teaspoon of salt and 1 teaspoon of baking soda to 2 cups of distilled water. If you make your own, fill a bulb syringe with the solution, insert the tip into your nostril, and squeeze gently. Recardo Donnell your nose. · Put a hot, wet towel or a warm gel pack on your face 3 or 4 times a day for 5 to 10 minutes each time. · Try a decongestant nasal spray like oxymetazoline (Afrin). Do not use it for more than 3 days in a row. Using it for more than 3 days can make your congestion worse. When should you call for help? Call your doctor now or seek immediate medical care if:  ? · You have new or worse swelling or redness in your face or around your eyes. ? · You have a new or higher fever. ? Watch closely for changes in your health, and be sure to contact your doctor if:  ? · You have new or worse facial pain. ? · The mucus from your nose becomes thicker (like pus) or has new blood in it. ? · You are not getting better as expected. Where can you learn more? Go to http://andriy-francisco.info/. Enter Y484 in the search box to learn more about \"Sinusitis: Care Instructions. \"  Current as of: May 12, 2017  Content Version: 11.4  © 7104-1808 [employer]. Care instructions adapted under license by [employer] (which disclaims liability or warranty for this information). If you have questions about a medical condition or this instruction, always ask your healthcare professional. [address] any warranty or liability for your use of this information.

## 2018-01-15 ENCOUNTER — OFFICE VISIT (OUTPATIENT)
Dept: GERIATRIC MEDICINE | Age: 83
End: 2018-01-15

## 2018-01-15 VITALS
TEMPERATURE: 97.7 F | OXYGEN SATURATION: 98 % | DIASTOLIC BLOOD PRESSURE: 59 MMHG | SYSTOLIC BLOOD PRESSURE: 146 MMHG | BODY MASS INDEX: 20.58 KG/M2 | RESPIRATION RATE: 20 BRPM | HEIGHT: 61 IN | HEART RATE: 71 BPM | WEIGHT: 109 LBS

## 2018-01-15 DIAGNOSIS — R53.1 WEAKNESS GENERALIZED: ICD-10-CM

## 2018-01-15 DIAGNOSIS — R05.8 COUGH WITH CONGESTION OF PARANASAL SINUS: Primary | ICD-10-CM

## 2018-01-15 DIAGNOSIS — R09.81 COUGH WITH CONGESTION OF PARANASAL SINUS: Primary | ICD-10-CM

## 2018-01-15 DIAGNOSIS — R09.89 CHEST CONGESTION: ICD-10-CM

## 2018-01-15 DIAGNOSIS — J20.9 BRONCHITIS, ACUTE, WITH BRONCHOSPASM: ICD-10-CM

## 2018-01-15 DIAGNOSIS — J02.9 SORE THROAT: ICD-10-CM

## 2018-01-15 DIAGNOSIS — R06.2 EXPIRATORY WHEEZING ON LEFT SIDE OF CHEST: ICD-10-CM

## 2018-01-15 RX ORDER — GUAIFENESIN 100 MG/5ML
200 SOLUTION ORAL
Qty: 118 ML | Refills: 1 | Status: SHIPPED | OUTPATIENT
Start: 2018-01-15 | End: 2018-04-16 | Stop reason: ALTCHOICE

## 2018-01-15 RX ORDER — IPRATROPIUM BROMIDE AND ALBUTEROL SULFATE 2.5; .5 MG/3ML; MG/3ML
3 SOLUTION RESPIRATORY (INHALATION)
Qty: 3 ML | Refills: 0
Start: 2018-01-15 | End: 2018-01-15

## 2018-01-15 RX ORDER — ALBUTEROL SULFATE 90 UG/1
1 AEROSOL, METERED RESPIRATORY (INHALATION)
Qty: 1 INHALER | Refills: 2 | Status: SHIPPED | OUTPATIENT
Start: 2018-01-15 | End: 2018-10-16

## 2018-01-15 RX ORDER — MOXIFLOXACIN HYDROCHLORIDE 400 MG/1
400 TABLET ORAL DAILY
Qty: 10 TAB | Refills: 0 | Status: SHIPPED | OUTPATIENT
Start: 2018-01-15 | End: 2018-01-25

## 2018-01-15 NOTE — PATIENT INSTRUCTIONS
Bronchitis: Care Instructions  Your Care Instructions    Bronchitis is inflammation of the bronchial tubes, which carry air to the lungs. The tubes swell and produce mucus, or phlegm. The mucus and inflamed bronchial tubes make you cough. You may have trouble breathing. Most cases of bronchitis are caused by viruses like those that cause colds. Antibiotics usually do not help and they may be harmful. Bronchitis usually develops rapidly and lasts about 2 to 3 weeks in otherwise healthy people. Follow-up care is a key part of your treatment and safety. Be sure to make and go to all appointments, and call your doctor if you are having problems. It's also a good idea to know your test results and keep a list of the medicines you take. How can you care for yourself at home? · Take all medicines exactly as prescribed. Call your doctor if you think you are having a problem with your medicine. · Get some extra rest.  · Take an over-the-counter pain medicine, such as acetaminophen (Tylenol), ibuprofen (Advil, Motrin), or naproxen (Aleve) to reduce fever and relieve body aches. Read and follow all instructions on the label. · Do not take two or more pain medicines at the same time unless the doctor told you to. Many pain medicines have acetaminophen, which is Tylenol. Too much acetaminophen (Tylenol) can be harmful. · Take an over-the-counter cough medicine that contains dextromethorphan to help quiet a dry, hacking cough so that you can sleep. Avoid cough medicines that have more than one active ingredient. Read and follow all instructions on the label. · Breathe moist air from a humidifier, hot shower, or sink filled with hot water. The heat and moisture will thin mucus so you can cough it out. · Do not smoke. Smoking can make bronchitis worse. If you need help quitting, talk to your doctor about stop-smoking programs and medicines. These can increase your chances of quitting for good.   When should you call for help? Call 911 anytime you think you may need emergency care. For example, call if:  ? · You have severe trouble breathing. ?Call your doctor now or seek immediate medical care if:  ? · You have new or worse trouble breathing. ? · You cough up dark brown or bloody mucus (sputum). ? · You have a new or higher fever. ? · You have a new rash. ? Watch closely for changes in your health, and be sure to contact your doctor if:  ? · You cough more deeply or more often, especially if you notice more mucus or a change in the color of your mucus. ? · You are not getting better as expected. Where can you learn more? Go to http://andriy-francisco.info/. Enter H333 in the search box to learn more about \"Bronchitis: Care Instructions. \"  Current as of: May 12, 2017  Content Version: 11.4  © 4617-3273 [employer]. Care instructions adapted under license by [employer] (which disclaims liability or warranty for this information). If you have questions about a medical condition or this instruction, always ask your healthcare professional. [place] [address] any warranty or liability for your use of this information. Cough: Care Instructions  Your Care Instructions    A cough is your body's response to something that bothers your throat or airways. Many things can cause a cough. You might cough because of a cold or the flu, bronchitis, or asthma. Smoking, postnasal drip, allergies, and stomach acid that backs up into your throat also can cause coughs. A cough is a symptom, not a disease. Most coughs stop when the cause, such as a cold, goes away. You can take a few steps at home to cough less and feel better. Follow-up care is a key part of your treatment and safety. Be sure to make and go to all appointments, and call your doctor if you are having problems. It's also a good idea to know your test results and keep a list of the medicines you take.   How can you care for yourself at home? · Drink lots of water and other fluids. This helps thin the mucus and soothes a dry or sore throat. Honey or lemon juice in hot water or tea may ease a dry cough. · Take cough medicine as directed by your doctor. · Prop up your head on pillows to help you breathe and ease a dry cough. · Try cough drops to soothe a dry or sore throat. Cough drops don't stop a cough. Medicine-flavored cough drops are no better than candy-flavored drops or hard candy. · Do not smoke. Avoid secondhand smoke. If you need help quitting, talk to your doctor about stop-smoking programs and medicines. These can increase your chances of quitting for good. When should you call for help? Call 911 anytime you think you may need emergency care. For example, call if:  ? · You have severe trouble breathing. ?Call your doctor now or seek immediate medical care if:  ? · You cough up blood. ? · You have new or worse trouble breathing. ? · You have a new or higher fever. ? · You have a new rash. ? Watch closely for changes in your health, and be sure to contact your doctor if:  ? · You cough more deeply or more often, especially if you notice more mucus or a change in the color of your mucus. ? · You have new symptoms, such as a sore throat, an earache, or sinus pain. ? · You do not get better as expected. Where can you learn more? Go to http://andriy-francisco.info/. Enter D279 in the search box to learn more about \"Cough: Care Instructions. \"  Current as of: May 12, 2017  Content Version: 11.4  © 4319-1513 [employer]. Care instructions adapted under license by [employer] (which disclaims liability or warranty for this information). If you have questions about a medical condition or this instruction, always ask your healthcare professional. [address] any warranty or liability for your use of this information.        Sinusitis: Care Instructions  Your Care Instructions    Sinusitis is an infection of the lining of the sinus cavities in your head. Sinusitis often follows a cold. It causes pain and pressure in your head and face. In most cases, sinusitis gets better on its own in 1 to 2 weeks. But some mild symptoms may last for several weeks. Sometimes antibiotics are needed. Follow-up care is a key part of your treatment and safety. Be sure to make and go to all appointments, and call your doctor if you are having problems. It's also a good idea to know your test results and keep a list of the medicines you take. How can you care for yourself at home? · Take an over-the-counter pain medicine, such as acetaminophen (Tylenol), ibuprofen (Advil, Motrin), or naproxen (Aleve). Read and follow all instructions on the label. · If the doctor prescribed antibiotics, take them as directed. Do not stop taking them just because you feel better. You need to take the full course of antibiotics. · Be careful when taking over-the-counter cold or flu medicines and Tylenol at the same time. Many of these medicines have acetaminophen, which is Tylenol. Read the labels to make sure that you are not taking more than the recommended dose. Too much acetaminophen (Tylenol) can be harmful. · Breathe warm, moist air from a steamy shower, a hot bath, or a sink filled with hot water. Avoid cold, dry air. Using a humidifier in your home may help. Follow the directions for cleaning the machine. · Use saline (saltwater) nasal washes to help keep your nasal passages open and wash out mucus and bacteria. You can buy saline nose drops at a grocery store or drugstore. Or you can make your own at home by adding 1 teaspoon of salt and 1 teaspoon of baking soda to 2 cups of distilled water. If you make your own, fill a bulb syringe with the solution, insert the tip into your nostril, and squeeze gently. Glenice Mimes your nose.   · Put a hot, wet towel or a warm gel pack on your face 3 or 4 times a day for 5 to 10 minutes each time. · Try a decongestant nasal spray like oxymetazoline (Afrin). Do not use it for more than 3 days in a row. Using it for more than 3 days can make your congestion worse. When should you call for help? Call your doctor now or seek immediate medical care if:  ? · You have new or worse swelling or redness in your face or around your eyes. ? · You have a new or higher fever. ? Watch closely for changes in your health, and be sure to contact your doctor if:  ? · You have new or worse facial pain. ? · The mucus from your nose becomes thicker (like pus) or has new blood in it. ? · You are not getting better as expected. Where can you learn more? Go to http://andriy-francisco.info/. Enter E482 in the search box to learn more about \"Sinusitis: Care Instructions. \"  Current as of: May 12, 2017  Content Version: 11.4  © 3241-1671 [employer]. Care instructions adapted under license by "[employer] (which disclaims liability or warranty for this information). If you have questions about a medical condition or this instruction, always ask your healthcare professional. [place] [address] any warranty or liability for your use of this information.

## 2018-01-15 NOTE — PROGRESS NOTES
Visit Vitals    /59 (BP 1 Location: Left arm, BP Patient Position: Sitting)    Pulse 71    Temp 97.7 °F (36.5 °C) (Oral)    Resp 20    Ht 5' 1\" (1.549 m)    Wt 109 lb (49.4 kg)    SpO2 98%    BMI 20.6 kg/m2      Chief Complaint   Patient presents with   [facility]     Patient here for follow up with sore throat.

## 2018-01-15 NOTE — PROGRESS NOTES
[address]   [phone]  __________________________________________________________  Lubna Canas  1/15/2018      Your instruction for care after your visit today are as follows: Any questions do not hesitate to call Kiki Guevara [facility] Nurse at [phone]. Please STOP taking the following medications: There are no discontinued medications. Please START taking the following medication:    Orders Placed This Encounter    DE PRESSURIZED/NONPRESSURIZED INHALATION TREATMENT- Done in Clinic Only     albuterol-ipratropium (DUO-NEB) 2.5 mg-0.5 mg/3 ml nebu     Sig: 3 mL by Nebulization route now for 1 dose. Dispense:  3 mL     Refill:  0    moxifloxacin (AVELOX) 400 mg tablet     Sig: Take 1 Tab by mouth daily for 10 days. Indications: ACUTE BACTERIAL SINUSITIS     Dispense:  10 Tab     Refill:  0    albuterol (PROVENTIL HFA, VENTOLIN HFA, PROAIR HFA) 90 mcg/actuation inhaler     Sig: Take 1 Puff by inhalation every six (6) hours as needed for Wheezing or Shortness of Breath. Indications: BRONCHOSPASM PREVENTION     Dispense:  1 Inhaler     Refill:  2    guaiFENesin (ROBAFEN) 100 mg/5 mL liquid     Sig: Take 10 mL by mouth three (3) times daily as needed for Cough. Indications: Cough     Dispense:  118 mL     Refill:  1     Diagnoses and all orders for this visit:    1. Cough with congestion of paranasal sinus  -     DE PRESSURIZED/NONPRESSURIZED INHALATION TREATMENT  -     albuterol-ipratropium (DUO-NEB) 2.5 mg-0.5 mg/3 ml nebu; 3 mL by Nebulization route now for 1 dose.  -     moxifloxacin (AVELOX) 400 mg tablet; Take 1 Tab by mouth daily for 10 days. Indications: ACUTE BACTERIAL SINUSITIS  -     albuterol (PROVENTIL HFA, VENTOLIN HFA, PROAIR HFA) 90 mcg/actuation inhaler; Take 1 Puff by inhalation every six (6) hours as needed for Wheezing or Shortness of Breath.  Indications: BRONCHOSPASM PREVENTION  -     guaiFENesin (ROBAFEN) 100 mg/5 mL liquid; Take 10 mL by mouth three (3) times daily as needed for Cough. Indications: Cough    2. Weakness generalized    3. Chest congestion  -     moxifloxacin (AVELOX) 400 mg tablet; Take 1 Tab by mouth daily for 10 days. Indications: ACUTE BACTERIAL SINUSITIS  -     albuterol (PROVENTIL HFA, VENTOLIN HFA, PROAIR HFA) 90 mcg/actuation inhaler; Take 1 Puff by inhal every six (6) hours as needed for Wheezing or Shortness of Breath. Indications: BRONCHOSPASM PREVENTION  -     guaiFENesin (ROBAFEN) 100 mg/5 mL liquid; Take 10 mL by mouth three (3) times daily as needed for Cough. Indications: Cough    4. Sore throat    5. Expiratory wheezing on left side of chest  -     moxifloxacin (AVELOX) 400 mg tablet; Take 1 Tab by mouth daily for 10 days. Indications: ACUTE BACTERIAL SINUSITIS  -     albuterol (PROVENTIL HFA, VENTOLIN HFA, PROAIR HFA) 90 mcg/actuation inhaler; Take 1 Puff by inhalation every six (6) hours as needed for Wheezing or Shortness of Breath. Indications: BRONCHOSPASM PREVENTION  -     guaiFENesin (ROBAFEN) 100 mg/5 mL liquid; Take 10 mL by mouth three (3) times daily as needed for Cough. Indications: Cough    6. Bronchitis, acute, with bronchospasm  -     moxifloxacin (AVELOX) 400 mg tablet; Take 1 Tab by mouth daily for 10 days. Indications: ACUTE BACTERIAL SINUSITIS  -     albuterol (PROVENTIL HFA, VENTOLIN HFA, PROAIR HFA) 90 mcg/actuation inhaler; Take 1 Puff by inhalation every six (6) hours as needed for Wheezing or Shortness of Breath. Indications: BRONCHOSPASM PREVENTION  -     guaiFENesin (ROBAFEN) 100 mg/5 mL liquid; Take 10 mL by mouth three (3) times daily as needed for Cough. Indications: Cough      Follow-up Disposition:  Return in about 3 days (around 1/18/2018) for follow up to care provided today. .    Thank you for allowing us to participate in your health care.

## 2018-01-17 ENCOUNTER — TELEPHONE (OUTPATIENT)
Dept: GERIATRIC MEDICINE | Age: 83
End: 2018-01-17

## 2018-01-18 ENCOUNTER — OFFICE VISIT (OUTPATIENT)
Dept: GERIATRIC MEDICINE | Age: 83
End: 2018-01-18

## 2018-01-18 VITALS
DIASTOLIC BLOOD PRESSURE: 53 MMHG | OXYGEN SATURATION: 98 % | TEMPERATURE: 98.7 F | WEIGHT: 109 LBS | RESPIRATION RATE: 20 BRPM | HEIGHT: 61 IN | HEART RATE: 74 BPM | SYSTOLIC BLOOD PRESSURE: 116 MMHG | BODY MASS INDEX: 20.58 KG/M2

## 2018-01-18 DIAGNOSIS — M15.9 PRIMARY OSTEOARTHRITIS INVOLVING MULTIPLE JOINTS: ICD-10-CM

## 2018-01-18 DIAGNOSIS — R09.81 COUGH WITH CONGESTION OF PARANASAL SINUS: Primary | ICD-10-CM

## 2018-01-18 DIAGNOSIS — R09.89 CHEST CONGESTION: ICD-10-CM

## 2018-01-18 DIAGNOSIS — E78.5 HYPERLIPIDEMIA, UNSPECIFIED HYPERLIPIDEMIA TYPE: ICD-10-CM

## 2018-01-18 DIAGNOSIS — R05.8 COUGH WITH CONGESTION OF PARANASAL SINUS: Primary | ICD-10-CM

## 2018-01-18 DIAGNOSIS — J20.9 BRONCHITIS, ACUTE, WITH BRONCHOSPASM: ICD-10-CM

## 2018-01-18 RX ORDER — SIMVASTATIN 10 MG/1
10 TABLET, FILM COATED ORAL
Qty: 90 TAB | Refills: 1 | Status: SHIPPED | OUTPATIENT
Start: 2018-01-18 | End: 2018-04-16 | Stop reason: SDUPTHER

## 2018-01-18 RX ORDER — CALCIUM CARBONATE 600 MG
600 TABLET ORAL 2 TIMES DAILY
Qty: 60 TAB | Refills: 2 | Status: SHIPPED | OUTPATIENT
Start: 2018-01-18 | End: 2018-04-10 | Stop reason: SDUPTHER

## 2018-01-18 NOTE — PROGRESS NOTES
Reason for Visit:      Chief Complaint   Patient presents with   [facility]     Patient here for follow up with sore throat, she states she is feeling better. History of Present Illness: Suni Lock is a 80 y.o. 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 Ms Neal Holstein reports she is feeling much better almost normal again. Diagnosis/Plan of Care:   During today's visit the following medications or treatments were requested to be discontinued by the provider :   Medications Discontinued During This Encounter   Medication Reason    simvastatin (ZOCOR) 10 mg tablet Reorder    CALCIUM CARBONATE (CALCIUM 600 PO) Reorder       Diagnoses and all orders for this visit:    1. Cough with congestion of paranasal sinus    2. Hyperlipidemia, unspecified hyperlipidemia type  -     simvastatin (ZOCOR) 10 mg tablet; Take 1 Tab by mouth nightly. Indications: hyperlipidemia    3. Chest congestion    4. Bronchitis, acute, with bronchospasm    5. Primary osteoarthritis involving multiple joints  -     calcium carbonate (CALCIUM 600) 600 mg calcium (1,500 mg) tablet; Take 1 Tab by mouth two (2) times a day. Continue the current therapies until they are completed and return as needed for follow up and care. Subjective: Allergies   Allergen Reactions    Latex Rash    Orabase (Benzocaine) [Benzocaine] Anaphylaxis    Adhesive Rash    Codeine Nausea and Vomiting    Darvocet A500 [Propoxyphene N-Acetaminophen] Unknown (comments)    Hydrocodone Unknown (comments)    Myrbetriq [Mirabegron] Myalgia     Reports leg cramps from the medication. Current Outpatient Prescriptions on File Prior to Visit   Medication Sig Dispense Refill    moxifloxacin (AVELOX) 400 mg tablet Take 1 Tab by mouth daily for 10 days.  Indications: ACUTE BACTERIAL SINUSITIS 10 Tab 0    albuterol (PROVENTIL HFA, VENTOLIN HFA, PROAIR HFA) 90 mcg/actuation inhaler Take 1 Puff by inhalation every six (6) hours as needed for Wheezing or Shortness of Breath. Indications: BRONCHOSPASM PREVENTION 1 Inhaler 2    guaiFENesin (ROBAFEN) 100 mg/5 mL liquid Take 10 mL by mouth three (3) times daily as needed for Cough. Indications: Cough 118 mL 1    montelukast (SINGULAIR) 10 mg tablet Take 1 Tab by mouth daily. Indications: Allergic Rhinitis 30 Tab 2    clarithromycin (BIAXIN) 250 mg tablet Take 1 Tab by mouth two (2) times a day for 7 days. Indications: ACUTE BACTERIAL MAXILLARY SINUSITIS 14 Tab 0    loperamide (IMODIUM) 2 mg capsule Take  by mouth.  cholecalciferol (VITAMIN D3) 1,000 unit tablet Take 1 Tab by mouth daily. Indications: OSTEOPOROSIS 30 Tab 5    atenolol (TENORMIN) 100 mg tablet Take 1 Tab by mouth daily. 90 Tab 3    clotrimazole-betamethasone (LOTRISONE) topical cream Apply twice daily to affected area x 10 days. 15 g 0    carbamide peroxide (DEBROX) 6.5 % otic solution Administer 5 Drops in right ear two (2) times a week. 150 mL 1    hydrochlorothiazide (HYDRODIURIL) 12.5 mg tablet Take 1 Tab by mouth daily. 90 Tab 1    acetaminophen (TYLENOL) 325 mg tablet Take  by mouth every four (4) hours as needed for Pain.  peg 400-propylene glycol (SYSTANE) 0.4-0.3 % drop Administer 1 Drop to both eyes four (4) times daily.  amoxicillin (AMOXIL) 500 mg capsule Take 500 mg by mouth. Take 4 caps one hour prior to dental appointments. Indications: prophylactic      metronidazole (METROLOTION) 0.75 % lotion Apply  to affected area nightly. (Patient taking differently: Apply  to affected area every morning. Indications: ACNE ROSACEA) 59 mL 1    aspirin 81 mg tablet Take 81 mg by mouth daily. No current facility-administered medications on file prior to visit.        Past Medical History:   Diagnosis Date    Allergic rhinitis     Arthritis     Benign hematuria     negative w/u by Dr. Barbara Mcfadden Hematuria     Hypertension     Osteoarthritis     Osteopenia     Overactive bladder     Rosacea     Rosacea conjunctivitis(372.31)     Shingles     Stress incontinence       Past Surgical History:   Procedure Laterality Date    ENDOSCOPY, COLON, DIAGNOSTIC  7/2005    (Caroline Zaragoza)    HX CATARACT REMOVAL  7/2004    bilateral    HX ORTHOPAEDIC      toe surgery for bone spurs    HX IRENE AND BSO  1980    TOTAL HIP ARTHROPLASTY  2000    left    TOTAL HIP ARTHROPLASTY  2001    right      Social History   Substance Use Topics    Smoking status: Never Smoker    Smokeless tobacco: Never Used    Alcohol use No      Family History   Problem Relation Age of Onset    Stroke Mother      x2    Heart Attack Mother 61    Parkinsonism Father     Breast Cancer Sister     Hypertension Sister     Arthritis-osteo Sister     Heart defect Sister      MVP    Stroke Sister     Arthritis-osteo Sister     Hypertension Sister     Diabetes Sister     Breast Cancer Sister     Other Sister      polycythemia vera    Allergic Rhinitis Son     Hypertension Son     Headache Son     Allergic Rhinitis Daughter     Allergic Rhinitis Son       Latest Laboratory Results:     Lab Results   Component Value Date/Time    WBC 6.0 10/18/2017 12:20 PM    HGB 13.5 10/18/2017 12:20 PM    HCT 40.4 10/18/2017 12:20 PM    PLATELET 481 10/17/6972 12:20 PM     Lab Results   Component Value Date/Time    ALT (SGPT) 27 10/18/2017 12:20 PM    AST (SGOT) 44 10/18/2017 12:20 PM    Creatinine 0.71 10/18/2017 12:20 PM    BUN 19 10/18/2017 12:20 PM    Sodium 140 10/18/2017 12:20 PM    Potassium 4.4 10/18/2017 12:20 PM     Lab Results   Component Value Date/Time    Cholesterol, total 161 10/18/2017 12:20 PM    HDL Cholesterol 71 10/18/2017 12:20 PM    LDL, calculated 69 10/18/2017 12:20 PM    Triglyceride 106 10/18/2017 12:20 PM    TSH 2.240 10/18/2017 12:20 PM    Hemoglobin A1c 5.9 10/18/2017 12:20 PM     Objective:     Vitals: 01/18/18 0954   BP: 116/53   Pulse: 74   Resp: 20   Temp: 98.7 °F (37.1 °C)   TempSrc: Oral   SpO2: 98%   Weight: 109 lb (49.4 kg)   Height: 5' 1\" (1.549 m)     Review of Systems   Constitutional: Positive for fatigue. Negative for activity change, appetite change and fever. HENT: Positive for hearing loss, postnasal drip and voice change. Negative for congestion, dental problem, rhinorrhea, sinus pain, sinus pressure, sneezing, sore throat and trouble swallowing. Eyes: Negative for discharge, redness and visual disturbance. Respiratory: Positive for cough. Negative for apnea, chest tightness, shortness of breath and wheezing. Cardiovascular: Negative for chest pain, palpitations and leg swelling. Gastrointestinal: Negative for abdominal distention, abdominal pain, blood in stool, constipation, diarrhea, nausea and vomiting. Endocrine: Negative for polydipsia, polyphagia and polyuria. Genitourinary: Negative for flank pain, frequency and urgency. Musculoskeletal: Negative for arthralgias, back pain, gait problem, joint swelling and neck pain. Skin: Negative for color change, pallor, rash and wound. Allergic/Immunologic: Negative for environmental allergies and food allergies. Neurological: Negative for dizziness, tremors, weakness, light-headedness, numbness and headaches. Hematological: Negative for adenopathy. Psychiatric/Behavioral: Negative for agitation, confusion and sleep disturbance. The patient is not nervous/anxious. Physical Exam   Constitutional: She is oriented to person, place, and time and well-developed, well-nourished, and in no distress. Vital signs are normal. She appears to not be writhing in pain, not malnourished and not dehydrated. She appears healthy. She does not have a sickly appearance. No distress. Average size, elderly  female, alert and oriented x 3 appears to be feeling much better. In no apparent acute distress.     HENT:   Head: Normocephalic and atraumatic. Right Ear: Tympanic membrane, external ear and ear canal normal. Decreased hearing is noted. Left Ear: Tympanic membrane, external ear and ear canal normal. Decreased hearing is noted. Nose: Mucosal edema present. No rhinorrhea or sinus tenderness. Right sinus exhibits no frontal sinus tenderness. Left sinus exhibits no frontal sinus tenderness. Mouth/Throat: Uvula is midline. Mucous membranes are not pale, not dry and not cyanotic. No oral lesions. No uvula swelling. Posterior oropharyngeal erythema present. No posterior oropharyngeal edema. Eyes: Conjunctivae, EOM and lids are normal. Pupils are equal, round, and reactive to light. Lids are everted and swept, no foreign bodies found. Neck: Trachea normal, normal range of motion and full passive range of motion without pain. Neck supple. No hepatojugular reflux and no JVD present. Carotid bruit is not present. No thyromegaly present. Cardiovascular: Normal rate, regular rhythm, S1 normal, S2 normal, normal heart sounds, intact distal pulses and normal pulses. Occasional extrasystoles are present. Exam reveals no gallop and no friction rub. No murmur heard. No lower extremity edema present today. Pulmonary/Chest: Effort normal and breath sounds normal.   Abdominal: Soft. Normal appearance and bowel sounds are normal. She exhibits no distension, no fluid wave and no mass. There is no hepatosplenomegaly. There is no tenderness. There is no rebound and no CVA tenderness. Musculoskeletal: Normal range of motion. Lymphadenopathy:        Head (right side): No submandibular adenopathy present. Head (left side): No submandibular and no tonsillar adenopathy present. She has no cervical adenopathy. She has no axillary adenopathy. Neurological: She is alert and oriented to person, place, and time. She has normal motor skills, normal sensation, normal strength, normal reflexes and intact cranial nerves. She displays no weakness. She exhibits normal muscle tone. Gait normal. Coordination and gait normal.   Skin: Skin is warm, dry and intact. No bruising, no ecchymosis and no rash noted. She is not diaphoretic. No cyanosis. No pallor. Nails show no clubbing. Psychiatric: Mood, memory, affect and judgment normal.   Baseline mood and affect unchanged from normal.         Disclaimer:   Advised Nolberto Gay to call back or return to office if symptoms worsen/change/persist.  Discussed expected course/resolution/complications of diagnosis in detail with patient. Medication risks/benefits/costs/interactions/alternatives discussed with patient and she was given an after visit summary which includes diagnoses, current medications, & vitals. Nolberto Gay expressed understanding with the diagnosis and plan.

## 2018-01-18 NOTE — PROGRESS NOTES
Visit Vitals    /53 (BP 1 Location: Left arm, BP Patient Position: Sitting)    Pulse 74    Temp 98.7 °F (37.1 °C) (Oral)    Resp 20    Ht 5' 1\" (1.549 m)    Wt 109 lb (49.4 kg)    SpO2 98%    BMI 20.6 kg/m2      Chief Complaint   Patient presents with   [facility]     Patient here for follow up with sore throat, she states she is feeling better.

## 2018-01-31 NOTE — PROGRESS NOTES
Reason for Visit:      Chief Complaint   Patient presents with   [facility]     Patient here for follow up with sore throat. History of Present Illness: Rahul Colindres is a 80 y.o. 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 Diagnosis/Plan of Care:   During today's visit the following medications or treatments were requested to be discontinued by the provider :   There are no discontinued medications. Diagnoses and all orders for this visit:    1. Cough with congestion of paranasal sinus  -     MD PRESSURIZED/NONPRESSURIZED INHALATION TREATMENT  -     albuterol-ipratropium (DUO-NEB) 2.5 mg-0.5 mg/3 ml nebu; 3 mL by Nebulization route now for 1 dose.  -     moxifloxacin (AVELOX) 400 mg tablet; Take 1 Tab by mouth daily for 10 days. Indications: ACUTE BACTERIAL SINUSITIS  -     albuterol (PROVENTIL HFA, VENTOLIN HFA, PROAIR HFA) 90 mcg/actuation inhaler; Take 1 Puff by inhalation every six (6) hours as needed for Wheezing or Shortness of Breath. Indications: BRONCHOSPASM PREVENTION  -     guaiFENesin (ROBAFEN) 100 mg/5 mL liquid; Take 10 mL by mouth three (3) times daily as needed for Cough. Indications: Cough    2. Weakness generalized  -     MD PRESSURIZED/NONPRESSURIZED INHALATION TREATMENT  -     albuterol-ipratropium (DUO-NEB) 2.5 mg-0.5 mg/3 ml nebu; 3 mL by Nebulization route now for 1 dose.  -     moxifloxacin (AVELOX) 400 mg tablet; Take 1 Tab by mouth daily for 10 days. Indications: ACUTE BACTERIAL SINUSITIS  -     albuterol (PROVENTIL HFA, VENTOLIN HFA, PROAIR HFA) 90 mcg/actuation inhaler; Take 1 Puff by inhalation every six (6) hours as needed for Wheezing or Shortness of Breath. Indications: BRONCHOSPASM PREVENTION  -     guaiFENesin (ROBAFEN) 100 mg/5 mL liquid; Take 10 mL by mouth three (3) times daily as needed for Cough. Indications: Cough    3.  Chest congestion  -     MD PRESSURIZED/NONPRESSURIZED INHALATION TREATMENT  - albuterol-ipratropium (DUO-NEB) 2.5 mg-0.5 mg/3 ml nebu; 3 mL by Nebulization route now for 1 dose.  -     moxifloxacin (AVELOX) 400 mg tablet; Take 1 Tab by mouth daily for 10 days. Indications: ACUTE BACTERIAL SINUSITIS  -     albuterol (PROVENTIL HFA, VENTOLIN HFA, PROAIR HFA) 90 mcg/actuation inhaler; Take 1 Puff by inhalation every six (6) hours as needed for Wheezing or Shortness of Breath. Indications: BRONCHOSPASM PREVENTION  -     guaiFENesin (ROBAFEN) 100 mg/5 mL liquid; Take 10 mL by mouth three (3) times daily as needed for Cough. Indications: Cough    4. Sore throat  -     [place] PRESSURIZED/NONPRESSURIZED INHALATION TREATMENT  -     albuterol-ipratropium (DUO-NEB) 2.5 mg-0.5 mg/3 ml nebu; 3 mL by Nebulization route now for 1 dose.  -     moxifloxacin (AVELOX) 400 mg tablet; Take 1 Tab by mouth daily for 10 days. Indications: ACUTE BACTERIAL SINUSITIS  -     albuterol (PROVENTIL HFA, VENTOLIN HFA, PROAIR HFA) 90 mcg/actuation inhaler; Take 1 Puff by inhalation every six (6) hours as needed for Wheezing or Shortness of Breath. Indications: BRONCHOSPASM PREVENTION  -     guaiFENesin (ROBAFEN) 100 mg/5 mL liquid; Take 10 mL by mouth three (3) times daily as needed for Cough. Indications: Cough    5. Expiratory wheezing on left side of chest  -     VA PRESSURIZED/NONPRESSURIZED INHALATION TREATMENT  -     albuterol-ipratropium (DUO-NEB) 2.5 mg-0.5 mg/3 ml nebu; 3 mL by Nebulization route now for 1 dose.  -     moxifloxacin (AVELOX) 400 mg tablet; Take 1 Tab by mouth daily for 10 days. Indications: ACUTE BACTERIAL SINUSITIS  -     albuterol (PROVENTIL HFA, VENTOLIN HFA, PROAIR HFA) 90 mcg/actuation inhaler; Take 1 Puff by inhalation every six (6) hours as needed for Wheezing or Shortness of Breath. Indications: BRONCHOSPASM PREVENTION  -     guaiFENesin (ROBAFEN) 100 mg/5 mL liquid; Take 10 mL by mouth three (3) times daily as needed for Cough. Indications: Cough    6.  Bronchitis, acute, with bronchospasm  - [place] PRESSURIZED/NONPRESSURIZED INHALATION TREATMENT  -     albuterol-ipratropium (DUO-NEB) 2.5 mg-0.5 mg/3 ml nebu; 3 mL by Nebulization route now for 1 dose.  -     moxifloxacin (AVELOX) 400 mg tablet; Take 1 Tab by mouth daily for 10 days. Indications: ACUTE BACTERIAL SINUSITIS  -     albuterol (PROVENTIL HFA, VENTOLIN HFA, PROAIR HFA) 90 mcg/actuation inhaler; Take 1 Puff by inhalation every six (6) hours as needed for Wheezing or Shortness of Breath. Indications: BRONCHOSPASM PREVENTION  -     guaiFENesin (ROBAFEN) 100 mg/5 mL liquid; Take 10 mL by mouth three (3) times daily as needed for Cough. Indications: Cough      Follow-up Disposition:  Return in about 3 days (around 1/18/2018) for follow up to care provided today. .    Subjective: Allergies   Allergen Reactions    Latex Rash    Orabase (Benzocaine) [Benzocaine] Anaphylaxis    Adhesive Rash    Codeine Nausea and Vomiting    Darvocet A500 [Propoxyphene N-Acetaminophen] Unknown (comments)    Hydrocodone Unknown (comments)    Myrbetriq [Mirabegron] Myalgia     Reports leg cramps from the medication. Current Outpatient Prescriptions on File Prior to Visit   Medication Sig Dispense Refill    montelukast (SINGULAIR) 10 mg tablet Take 1 Tab by mouth daily. Indications: Allergic Rhinitis 30 Tab 2    loperamide (IMODIUM) 2 mg capsule Take  by mouth.  cholecalciferol (VITAMIN D3) 1,000 unit tablet Take 1 Tab by mouth daily. Indications: OSTEOPOROSIS 30 Tab 5    atenolol (TENORMIN) 100 mg tablet Take 1 Tab by mouth daily. 90 Tab 3    clotrimazole-betamethasone (LOTRISONE) topical cream Apply twice daily to affected area x 10 days. 15 g 0    carbamide peroxide (DEBROX) 6.5 % otic solution Administer 5 Drops in right ear two (2) times a week. 150 mL 1    hydrochlorothiazide (HYDRODIURIL) 12.5 mg tablet Take 1 Tab by mouth daily. 90 Tab 1    acetaminophen (TYLENOL) 325 mg tablet Take  by mouth every four (4) hours as needed for Pain.       peg 400-propylene glycol (SYSTANE) 0.4-0.3 % drop Administer 1 Drop to both eyes four (4) times daily.  metronidazole (METROLOTION) 0.75 % lotion Apply  to affected area nightly. (Patient taking differently: Apply  to affected area every morning. Indications: ACNE ROSACEA) 59 mL 1    aspirin 81 mg tablet Take 81 mg by mouth daily.  amoxicillin (AMOXIL) 500 mg capsule Take 500 mg by mouth. Take 4 caps one hour prior to dental appointments. Indications: prophylactic       No current facility-administered medications on file prior to visit.        Past Medical History:   Diagnosis Date    Allergic rhinitis     Arthritis     Benign hematuria     negative w/u by Dr. Jose Ngo    Hematuria     Hypertension     Osteoarthritis     Osteopenia     Overactive bladder     Rosacea     Rosacea conjunctivitis(372.31)     Shingles     Stress incontinence       Past Surgical History:   Procedure Laterality Date    ENDOSCOPY, COLON, DIAGNOSTIC  7/2005    ([address]    HX CATARACT REMOVAL  7/2004    bilateral    HX ORTHOPAEDIC      toe surgery for bone spurs    HX IRENE AND BSO  1980    TOTAL HIP ARTHROPLASTY  2000    left    TOTAL HIP ARTHROPLASTY  2001    right      Social History   Substance Use Topics    Smoking status: Never Smoker    Smokeless tobacco: Never Used    Alcohol use No      Family History   Problem Relation Age of Onset    Stroke Mother      x2    Heart Attack Mother 61    Parkinsonism Father     Breast Cancer Sister     Hypertension Sister     Arthritis-osteo Sister     Heart defect Sister      MVP    Stroke Sister    [place] Sister     Hypertension Sister     Diabetes Sister     Breast Cancer Sister     Other Sister      polycythemia vera    Allergic Rhinitis Son     Hypertension Son     Headache Son     Allergic Rhinitis Daughter     Allergic Rhinitis Son       Latest Laboratory Results:     Lab Results   Component Value Date/Time    WBC 6.0 10/18/2017 12:20 PM    HGB 13.5 10/18/2017 12:20 PM    HCT 40.4 10/18/2017 12:20 PM    PLATELET 142 78/43/1476 12:20 PM     Lab Results   Component Value Date/Time    ALT (SGPT) 27 10/18/2017 12:20 PM    AST (SGOT) 44 10/18/2017 12:20 PM    Creatinine 0.71 10/18/2017 12:20 PM    BUN 19 10/18/2017 12:20 PM    Sodium 140 10/18/2017 12:20 PM    Potassium 4.4 10/18/2017 12:20 PM     Lab Results   Component Value Date/Time    Cholesterol, total 161 10/18/2017 12:20 PM    HDL Cholesterol 71 10/18/2017 12:20 PM    LDL, calculated 69 10/18/2017 12:20 PM    Triglyceride 106 10/18/2017 12:20 PM    TSH 2.240 10/18/2017 12:20 PM    Hemoglobin A1c 5.9 10/18/2017 12:20 PM     Objective:     Vitals:    01/15/18 1137   BP: 146/59   Pulse: 71   Resp: 20   Temp: 97.7 °F (36.5 °C)   TempSrc: Oral   SpO2: 98%   Weight: 109 lb (49.4 kg)   Height: 5' 1\" (1.549 m)     Review of Systems   Constitutional: Positive for activity change, appetite change and fatigue. Negative for fever. HENT: Positive for hearing loss, postnasal drip, rhinorrhea, sinus pain, sinus pressure, sneezing, sore throat, trouble swallowing and voice change. Negative for congestion and dental problem. Eyes: Negative for discharge, redness and visual disturbance. Respiratory: Positive for cough. Negative for apnea, chest tightness, shortness of breath and wheezing. Cardiovascular: Negative for chest pain, palpitations and leg swelling. Gastrointestinal: Negative for abdominal distention, abdominal pain, blood in stool, constipation, diarrhea, nausea and vomiting. Endocrine: Negative for polydipsia, polyphagia and polyuria. Genitourinary: Negative for flank pain, frequency and urgency. Musculoskeletal: Negative for arthralgias, back pain, gait problem, joint swelling and neck pain. Skin: Negative for color change, pallor, rash and wound. Allergic/Immunologic: Negative for environmental allergies and food allergies. Neurological: Positive for headaches.  Negative for dizziness, tremors, weakness, light-headedness and numbness. Hematological: Negative for adenopathy. Psychiatric/Behavioral: Negative for agitation, confusion and sleep disturbance. The patient is not nervous/anxious. Physical Exam   Constitutional: She is oriented to person, place, and time and well-developed, well-nourished, and in no distress. Vital signs are normal. She appears lethargic. She appears to not be writhing in pain, not malnourished and not dehydrated. She appears healthy. She has a sickly appearance. No distress. Average size, elderly  female, alert and oriented x 3 but difficulty remembering details and facts. Dry, labile, depressed effect today. In no apparent acute distress. HENT:   Head: Normocephalic and atraumatic. Right Ear: Tympanic membrane, external ear and ear canal normal. Decreased hearing is noted. Left Ear: Tympanic membrane, external ear and ear canal normal. Decreased hearing is noted. Nose: Mucosal edema, rhinorrhea and sinus tenderness present. Right sinus exhibits frontal sinus tenderness. Left sinus exhibits frontal sinus tenderness. Mouth/Throat: Uvula is midline. Mucous membranes are pale, dry and not cyanotic. No oral lesions. No uvula swelling. Posterior oropharyngeal erythema present. No posterior oropharyngeal edema. Eyes: Conjunctivae, EOM and lids are normal. Pupils are equal, round, and reactive to light. Lids are everted and swept, no foreign bodies found. Neck: Trachea normal, normal range of motion and full passive range of motion without pain. Neck supple. No hepatojugular reflux and no JVD present. Carotid bruit is not present. No thyromegaly present. Cardiovascular: Normal rate, regular rhythm, S1 normal, S2 normal, normal heart sounds, intact distal pulses and normal pulses. Occasional extrasystoles are present. Exam reveals no gallop and no friction rub. No murmur heard. No lower extremity edema present today. Pulmonary/Chest: Effort normal and breath sounds normal.   Abdominal: Soft. Normal appearance and bowel sounds are normal. She exhibits no distension, no fluid wave and no mass. There is no hepatosplenomegaly. There is no tenderness. There is no rebound and no CVA tenderness. Musculoskeletal: Normal range of motion. Lymphadenopathy:        Head (right side): No submandibular adenopathy present. Head (left side): No submandibular and no tonsillar adenopathy present. She has no cervical adenopathy. She has no axillary adenopathy. Neurological: She is oriented to person, place, and time. She has normal motor skills, normal sensation, normal strength, normal reflexes and intact cranial nerves. She appears lethargic. She displays no weakness. She exhibits normal muscle tone. Gait normal. Gait normal.   Skin: Skin is warm, dry and intact. No bruising, no ecchymosis and no rash noted. She is not diaphoretic. No cyanosis. There is pallor. Nails show no clubbing. Psychiatric: Mood, memory, affect and judgment normal.   Baseline mood and affect unchanged from normal.         Disclaimer:   Alina Robledo to call back or return to office if symptoms worsen/change/persist.  Discussed expected course/resolution/complications of diagnosis in detail with patient. Medication risks/benefits/costs/interactions/alternatives discussed with patient and she was given an after visit summary which includes diagnoses, current medications, & vitals. Lynette Robledo expressed understanding with the diagnosis and plan.

## 2018-03-20 DIAGNOSIS — I10 HYPERTENSION, UNSPECIFIED TYPE: Primary | ICD-10-CM

## 2018-03-20 RX ORDER — ASPIRIN 81 MG/1
81 TABLET ORAL DAILY
Qty: 90 TAB | Refills: 2 | Status: SHIPPED | OUTPATIENT
Start: 2018-03-20

## 2018-03-20 RX ORDER — HYDROCHLOROTHIAZIDE 12.5 MG/1
12.5 TABLET ORAL DAILY
Qty: 90 TAB | Refills: 1 | Status: SHIPPED | OUTPATIENT
Start: 2018-03-20 | End: 2018-09-12 | Stop reason: SDUPTHER

## 2018-03-20 RX ORDER — ATENOLOL 100 MG/1
100 TABLET ORAL DAILY
Qty: 90 TAB | Refills: 3 | Status: SHIPPED | OUTPATIENT
Start: 2018-03-20 | End: 2019-03-07 | Stop reason: SDUPTHER

## 2018-04-10 ENCOUNTER — OFFICE VISIT (OUTPATIENT)
Dept: GERIATRIC MEDICINE | Age: 83
End: 2018-04-10

## 2018-04-10 VITALS
HEART RATE: 66 BPM | OXYGEN SATURATION: 98 % | BODY MASS INDEX: 20.56 KG/M2 | RESPIRATION RATE: 18 BRPM | DIASTOLIC BLOOD PRESSURE: 57 MMHG | SYSTOLIC BLOOD PRESSURE: 132 MMHG | HEIGHT: 61 IN | WEIGHT: 108.9 LBS | TEMPERATURE: 97.8 F

## 2018-04-10 DIAGNOSIS — Z66 DNR (DO NOT RESUSCITATE): ICD-10-CM

## 2018-04-10 DIAGNOSIS — M15.9 PRIMARY OSTEOARTHRITIS INVOLVING MULTIPLE JOINTS: ICD-10-CM

## 2018-04-10 DIAGNOSIS — I10 ESSENTIAL HYPERTENSION: ICD-10-CM

## 2018-04-10 DIAGNOSIS — E78.5 HYPERLIPIDEMIA, UNSPECIFIED HYPERLIPIDEMIA TYPE: ICD-10-CM

## 2018-04-10 DIAGNOSIS — Z00.00 MEDICARE ANNUAL WELLNESS VISIT, SUBSEQUENT: Primary | ICD-10-CM

## 2018-04-10 DIAGNOSIS — Z71.89 ADVANCE CARE PLANNING: ICD-10-CM

## 2018-04-10 RX ORDER — CALCIUM CARBONATE 600 MG
600 TABLET ORAL 2 TIMES DAILY
Qty: 60 TAB | Refills: 2 | Status: SHIPPED | OUTPATIENT
Start: 2018-04-10 | End: 2018-04-16 | Stop reason: SDUPTHER

## 2018-04-10 NOTE — PROGRESS NOTES
Reason For Visit:   Junaid Rosario is a 80 y.o. female who presents for an annual Medicare Wellness Visit. Patient History:   PSH: Reviewed with patient  Past Surgical History:   Procedure Laterality Date    ENDOSCOPY, COLON, DIAGNOSTIC  7/2005    ([place])    HX CATARACT REMOVAL  7/2004    bilateral    HX ORTHOPAEDIC      toe surgery for bone spurs    HX IRENE AND BSO  1980    TOTAL HIP ARTHROPLASTY  2000    left    TOTAL HIP ARTHROPLASTY  2001    right      SH: Reviewed with patient  Social History   Substance Use Topics    Smoking status: Never Smoker    Smokeless tobacco: Never Used    Alcohol use No     FH: Reviewed with patient  Family History   Problem Relation Age of Onset    Stroke Mother      x2    Heart Attack Mother 61    Parkinsonism Father     Breast Cancer Sister     Hypertension Sister    [facility] Sister     Heart defect Sister      MVP    Stroke Sister    [facility] Sister     Hypertension Sister     Diabetes Sister     Breast Cancer Sister     Other Sister      polycythemia vera    Allergic Rhinitis Son     Hypertension Son     Headache Son     Allergic Rhinitis Daughter     Allergic Rhinitis Son      Medications/Allergies: Reviewed with patient. Current Outpatient Prescriptions on File Prior to Visit   Medication Sig Dispense Refill    aspirin delayed-release 81 mg tablet Take 1 Tab by mouth daily. 90 Tab 2    hydroCHLOROthiazide (HYDRODIURIL) 12.5 mg tablet Take 1 Tab by mouth daily. 90 Tab 1    atenolol (TENORMIN) 100 mg tablet Take 1 Tab by mouth daily. Indications: hypertension 90 Tab 3    simvastatin (ZOCOR) 10 mg tablet Take 1 Tab by mouth nightly. Indications: hyperlipidemia 90 Tab 1    albuterol (PROVENTIL HFA, VENTOLIN HFA, PROAIR HFA) 90 mcg/actuation inhaler Take 1 Puff by inhalation every six (6) hours as needed for Wheezing or Shortness of Breath.  Indications: BRONCHOSPASM PREVENTION 1 Inhaler 2    loperamide (IMODIUM) 2 mg capsule Take by mouth.  cholecalciferol (VITAMIN D3) 1,000 unit tablet Take 1 Tab by mouth daily. Indications: OSTEOPOROSIS 30 Tab 5    clotrimazole-betamethasone (LOTRISONE) topical cream Apply twice daily to affected area x 10 days. 15 g 0    carbamide peroxide (DEBROX) 6.5 % otic solution Administer 5 Drops in right ear two (2) times a week. 150 mL 1    acetaminophen (TYLENOL) 325 mg tablet Take  by mouth every four (4) hours as needed for Pain.  peg 400-propylene glycol (SYSTANE) 0.4-0.3 % drop Administer 1 Drop to both eyes four (4) times daily.  amoxicillin (AMOXIL) 500 mg capsule Take 500 mg by mouth. Take 4 caps one hour prior to dental appointments. Indications: prophylactic      metronidazole (METROLOTION) 0.75 % lotion Apply  to affected area nightly. (Patient taking differently: Apply  to affected area every morning. Indications: ACNE ROSACEA) 59 mL 1    guaiFENesin (ROBAFEN) 100 mg/5 mL liquid Take 10 mL by mouth three (3) times daily as needed for Cough. Indications: Cough 118 mL 1    montelukast (SINGULAIR) 10 mg tablet Take 1 Tab by mouth daily. Indications: Allergic Rhinitis 30 Tab 2    aspirin 81 mg tablet Take 81 mg by mouth daily. No current facility-administered medications on file prior to visit. Allergies   Allergen Reactions    Latex Rash    Orabase (Benzocaine) [Benzocaine] Anaphylaxis    Adhesive Rash    Codeine Nausea and Vomiting    Darvocet A500 [Propoxyphene N-Acetaminophen] Unknown (comments)    Hydrocodone Unknown (comments)    Myrbetriq [Mirabegron] Myalgia     Reports leg cramps from the medication. Patient Care Team:  Margaret Lewis NP as PCP - General  Objective:     Visit Vitals    /57 (BP 1 Location: Left arm, BP Patient Position: Sitting)    Pulse 66    Temp 97.8 °F (36.6 °C) (Oral)    Resp 18    Ht 5' 1\" (1.549 m)    Wt 108 lb 14.4 oz (49.4 kg)    SpO2 98%    BMI 20.58 kg/m2    Body mass index is 20.58 kg/(m^2).   No physical exam was performed today per Medicare Wellness Guidelines. Health Maintenance:   Daily Aspirin: yes  Bone Density: Yes;  Where: [facility], When: 2017  Glaucoma Screening: Yes  Immunizations:   Immunization History   Administered Date(s) Administered    Influenza Vaccine 10/01/2015, 10/01/2016    Influenza Vaccine Whole 09/29/2011    Pneumococcal Conjugate (PCV-13) 07/30/2015    Pneumococcal Polysaccharide (PPSV-23) 09/01/2008    Pneumococcal Vaccine (Unspecified Type) 10/01/1998    Td 09/30/2009    ZZZ-RETIRED (DO NOT USE) Pneumococcal Vaccine (Unspecified Type) 10/01/1998, 09/01/2008    Zoster Vaccine, Live 05/11/2012     Functional Assessment:   Depression Screen:   PHQ over the last two weeks 4/10/2018   PHQ Not Done -   Little interest or pleasure in doing things Not at all   Feeling down, depressed or hopeless Not at all   Total Score PHQ 2 0   Trouble falling or staying asleep, or sleeping too much Not at all   Feeling tired or having little energy Not at all   Poor appetite or overeating Not at all   Feeling bad about yourself - or that you are a failure or have let yourself or your family down Not at all   Trouble concentrating on things such as school, work, reading or watching TV -   Moving or speaking so slowly that other people could have noticed; or the opposite being so fidgety that others notice Not at all   Thoughts of being better off dead, or hurting yourself in some way Not at all   PHQ 9 Score -   How difficult have these problems made it for you to do your work, take care of your home and get along with others Not difficult at all      [facility] Exam 4/10/2018   What is the Year 1   What is the Season 1   What is the Date 1   What is the Day 1   What is the Month 1   Where are we State 1   Where are we Country 1   Where are we [place] city 1   [place] are we Floor 1   Name three objects, then ask the patient to say them 3   Serial sevens Subtract 7 from 100 in increments 1   Ask for the three objects repeated above 1   Name a pencil 1   Name a watch 1   Have the patient repeat this phrase \"No ifs, ands, or buts\" 1   Three stage command: Take the paper in your right hand 1   Fold the paper in half 1   Put the paper on the floor 1   Read and obey the following: CLOSE YOUR EYES 1   Have the patient write a sentence 1   Have the patient copy a figure 1   Mini Mental Score 24     Fall Risk:   Fall Risk Assessment, last 12 mths 4/10/2018   Able to walk? Yes   Fall in past 12 months? No      Abuse Screen:   Abuse Screening Questionnaire 4/10/2018   Do you ever feel afraid of your partner? N   Are you in a relationship with someone who physically or mentally threatens you? N   Is it safe for you to go home? Y      Hearing Status: grossly intact; wears hearing aides   Activities of Daily Living:Resides in [place] [id number] at [facility]. shedoes not require help with any ADLs   Adult Nutrition Screen: eats a balanced diet  Immunization status: up to date and documented    What are the patient's living arrangements - the patient lives alone. Does the patient use any ambulatory aids: yes   If so, what type: Rollator   Does the patient exhibit a steady gait? yes    Is the patient self reliant?  (ie can do own laundry, meals, household chores)  yes    Does the patient handle his/her own medications? yes   Does the patient handle his/her own money? yes   Is the patients home safe (ie good lighting, handrails on stairs and bath, etc.)? yes   Did you notice or did patient express any hearing difficulties? no   Did you notice or did patient express any vision difficulties?   no   Were distance and reading eye charts used?   yes     Advance Care Planning:    Date of ACP Conversation: 04/10/18  Persons included in Conversation:  patient  Length of ACP Conversation in minutes:  16 minutes  Is the patient competent to make his/her health decisions: yes  Authorized Decision Maker (if patient is incapable of making informed decisions):Healthcare Agent/Medical Power of  under Advance Directive  For Patients with Decision Making Capacity: \"In these circumstances, what matters most to you? \"  Care focused more on comfort or quality of life. Conversation Outcomes / Follow-Up Plan: Reviewed existing Advance Directive   Entered DNR order (If yes, complete Durable DNR form)  Referrals:    Was further evaluation necessary?no;   Treatment Plan: The following medication/treatments have been discontinued by the provider today:   Medications Discontinued During This Encounter   Medication Reason    calcium carbonate (CALCIUM 600) 600 mg calcium (1,500 mg) tablet Reorder       1. Medicare annual wellness visit, subsequent    2. Advance care planning    3. Primary osteoarthritis involving multiple joints    4. Hyperlipidemia, unspecified hyperlipidemia type    5. Essential hypertension    6. DNR (do not resuscitate)        Further medical records are not needed. Request will not be made. Disclaimer: This is an [facility] Exam (AWV). Patient verbalized understanding and agreement with the plan. A copy of the After Visit Summary with personalized health plan was given to the patient today. Greater than 30 minutes was spent with patient discussing advance directives, wellness initiatives, and preventative measures of their health. I have reviewed the patient's medical history in detail and updated the computerized patient record.      Ahsan Araya NP

## 2018-04-10 NOTE — LETTER
4/12/2018 10:03 AM 
 
Ms. Yesi [address] [facility] 7 [id number] Dear Crystal Greer: 
 
Please find your most recent results below. Resulted Orders CBC WITH AUTOMATED DIFF Result Value Ref Range WBC 5.6 3.4 - 10.8 x10E3/uL  
 RBC 4.46 3.77 - 5.28 x10E6/uL HGB 13.1 11.1 - 15.9 g/dL HCT 39.6 34.0 - 46.6 % MCV 89 79 - 97 fL  
 MCH 29.4 26.6 - 33.0 pg  
 MCHC 33.1 31.5 - 35.7 g/dL  
 RDW 14.8 12.3 - 15.4 % PLATELET 811 746 - 655 x10E3/uL NEUTROPHILS 70 Not Estab. % Lymphocytes 19 Not Estab. % MONOCYTES 9 Not Estab. % EOSINOPHILS 2 Not Estab. % BASOPHILS 0 Not Estab. %  
 ABS. NEUTROPHILS 3.9 1.4 - 7.0 x10E3/uL Abs Lymphocytes 1.1 0.7 - 3.1 x10E3/uL  
 ABS. MONOCYTES 0.5 0.1 - 0.9 x10E3/uL  
 ABS. EOSINOPHILS 0.1 0.0 - 0.4 x10E3/uL  
 ABS. BASOPHILS 0.0 0.0 - 0.2 x10E3/uL IMMATURE GRANULOCYTES 0 Not Estab. %  
 ABS. IMM. GRANS. 0.0 0.0 - 0.1 x10E3/uL Narrative Performed at:  [address]  [id number] : Sarah Morris MD, Phone:  [phone] METABOLIC PANEL, COMPREHENSIVE Result Value Ref Range Glucose 103 (H) 65 - 99 mg/dL Comment:  
   Specimen received in contact with cells. No visible hemolysis 
present. However GLUC may be decreased and K increased. Clinical 
correlation indicated. BUN 20 8 - 27 mg/dL Creatinine 0.83 0.57 - 1.00 mg/dL GFR est non-AA 64 >59 mL/min/1.73 GFR est AA 74 >59 mL/min/1.73  
 BUN/Creatinine ratio 24 12 - 28 Sodium 142 134 - 144 mmol/L Potassium 4.0 3.5 - 5.2 mmol/L Comment:  
   Specimen received in contact with cells. No visible hemolysis 
present. However GLUC may be decreased and K increased. Clinical 
correlation indicated. Chloride 100 96 - 106 mmol/L  
 CO2 26 18 - 29 mmol/L Calcium 9.4 8.7 - 10.3 mg/dL Protein, total 6.2 6.0 - 8.5 g/dL Albumin 4.1 3.5 - 4.7 g/dL GLOBULIN, TOTAL 2.1 1.5 - 4.5 g/dL A-G Ratio 2.0 1.2 - 2.2 Bilirubin, total 0.4 0.0 - 1.2 mg/dL Alk. phosphatase 72 39 - 117 IU/L  
 AST (SGOT) 33 0 - 40 IU/L  
 ALT (SGPT) 25 0 - 32 IU/L Narrative Performed at:  [address]  [id number] : Carmina Jo MD, Phone:  [phone] HEMOGLOBIN A1C WITH EAG Result Value Ref Range Hemoglobin A1c 5.8 (H) 4.8 - 5.6 % Comment:  
            Pre-diabetes: 5.7 - 6.4 Diabetes: >6.4 Glycemic control for adults with diabetes: <7.0 Estimated average glucose 120 mg/dL Narrative Performed at:  [address]  [id number] : Carmina Jo MD, Phone:  [phone] LIPID PANEL Result Value Ref Range Cholesterol, total 144 100 - 199 mg/dL Triglyceride 76 0 - 149 mg/dL HDL Cholesterol 72 >39 mg/dL VLDL, calculated 15 5 - 40 mg/dL LDL, calculated 57 0 - 99 mg/dL Narrative Performed at:  [address]  [id number] : Carmina Jo MD, Phone:  [phone] TSH REFLEX TO T4 Result Value Ref Range TSH 2.830 0.450 - 4.500 uIU/mL Narrative Performed at:  [address]  [id number] : Carmina Jo MD, Phone:  [phone] VITAMIN D, 25 HYDROXY Result Value Ref Range VITAMIN D, 25-HYDROXY 45.1 30.0 - 100.0 ng/mL Comment:  
   Vitamin D deficiency has been defined by the [facility] practice guideline as a 
level of serum 25-OH vitamin D less than 20 ng/mL (1,2). The Endocrine Society went on to further define vitamin D 
insufficiency as a level between 21 and 29 ng/mL (2). 1. IOM ([place] [employer]). 2010. Dietary reference 
   intakes for calcium and D. [facility]: The 
   marker.to. 2. Memo MF, Samuel NC, Andrei MOSQUERA, et al. 
   Evaluation, treatment, and prevention of vitamin D 
   deficiency: an Endocrine Society clinical practice 
   guideline. JCEM. 2011 Jul; 96(7):1911-30. Narrative Performed at:  [address]  [id number] : Krystle Zarco MD, Phone:  [phone] RECOMMENDATIONS: 
Excellent. All are stable and with in normal limits. Recheck labs in 3 months for routine monitoring. Please call me if you have any questions: [phone] Sincerely, 
 
 
Kenna Elizabeth NP

## 2018-04-10 NOTE — PROGRESS NOTES
ADVISED PATIENT OF THE FOLLOWING HEALTH MAINTAINCE DUE  Health Maintenance Due   Topic Date Due    MEDICARE Fadumo Slider  03/28/2018      Chief Complaint   Patient presents with   [facility] Annual Wellness Visit     Patient being seen for medicare wellness visit. 1. Have you been to the ER, urgent care clinic since your last visit? Hospitalized since your last visit? No    2. Have you seen or consulted any other health care providers outside of the [address] [place] [address] since your last visit? Include any DEXA scan, mammography  or colon screening. No    3. Do you have an Advance Directive on file? yes    4. Do you have a DNR on file? DNR        Patient is accompanied by self I have received verbal consent from Crystal Greer to discuss any/all medical information while they are present in the room.

## 2018-04-10 NOTE — PATIENT INSTRUCTIONS
Arthritis: Care Instructions  Your Care Instructions  Arthritis, also called osteoarthritis, is a breakdown of the cartilage that cushions your joints. When the cartilage wears down, your bones rub against each other. This causes pain and stiffness. Many people have some arthritis as they age. Arthritis most often affects the joints of the spine, hands, hips, knees, or feet. You can take simple measures to protect your joints, ease your pain, and help you stay active. Follow-up care is a key part of your treatment and safety. Be sure to make and go to all appointments, and call your doctor if you are having problems. It's also a good idea to know your test results and keep a list of the medicines you take. How can you care for yourself at home? · Stay at a healthy weight. Being overweight puts extra strain on your joints. · Talk to your doctor or physical therapist about exercises that will help ease joint pain. ¨ Stretch. You may enjoy gentle forms of yoga to help keep your joints and muscles flexible. ¨ Walk instead of jog. Other types of exercise that are less stressful on the joints include riding a bicycle, swimming, sim chi, or water exercise. ¨ Lift weights. Strong muscles help reduce stress on your joints. Stronger thigh muscles, for example, take some of the stress off of the knees and hips. Learn the right way to lift weights so you do not make joint pain worse. · Take your medicines exactly as prescribed. Call your doctor if you think you are having a problem with your medicine. · Take pain medicines exactly as directed. ¨ If the doctor gave you a prescription medicine for pain, take it as prescribed. ¨ If you are not taking a prescription pain medicine, ask your doctor if you can take an over-the-counter medicine. · Use a cane, crutch, walker, or another device if you need help to get around. These can help rest your joints.  You also can use other things to make life easier, such as a higher toilet seat and padded handles on kitchen utensils. · Do not sit in low chairs, which can make it hard to get up. · Put heat or cold on your sore joints as needed. Use whichever helps you most. You also can take turns with hot and cold packs. ¨ Apply heat 2 or 3 times a day for 20 to 30 minutes-using a heating pad, hot shower, or hot pack-to relieve pain and stiffness. ¨ Put ice or a cold pack on your sore joint for 10 to 20 minutes at a time. Put a thin cloth between the ice and your skin. When should you call for help? Call your doctor now or seek immediate medical care if:  ? · You have sudden swelling, warmth, or pain in any joint. ? · You have joint pain and a fever or rash. ? · You have such bad pain that you cannot use a joint. ? Watch closely for changes in your health, and be sure to contact your doctor if:  ? · You have mild joint symptoms that continue even with more than 6 weeks of care at home. ? · You have stomach pain or other problems with your medicine. Where can you learn more? Go to http://andriy-francisco.info/. Enter E365 in the search box to learn more about \"Arthritis: Care Instructions. \"  Current as of: October 31, 2016  Content Version: 11.4  © 4970-8184 [employer]. Care instructions adapted under license by [employer] (which disclaims liability or warranty for this information). If you have questions about a medical condition or this instruction, always ask your healthcare professional. [place] [address] any warranty or liability for your use of this information.

## 2018-04-11 LAB
25(OH)D3+25(OH)D2 SERPL-MCNC: 45.1 NG/ML (ref 30–100)
ALBUMIN SERPL-MCNC: 4.1 G/DL (ref 3.5–4.7)
ALBUMIN/GLOB SERPL: 2 {RATIO} (ref 1.2–2.2)
ALP SERPL-CCNC: 72 IU/L (ref 39–117)
ALT SERPL-CCNC: 25 IU/L (ref 0–32)
AST SERPL-CCNC: 33 IU/L (ref 0–40)
BASOPHILS # BLD AUTO: 0 X10E3/UL (ref 0–0.2)
BASOPHILS NFR BLD AUTO: 0 %
BILIRUB SERPL-MCNC: 0.4 MG/DL (ref 0–1.2)
BUN SERPL-MCNC: 20 MG/DL (ref 8–27)
BUN/CREAT SERPL: 24 (ref 12–28)
CALCIUM SERPL-MCNC: 9.4 MG/DL (ref 8.7–10.3)
CHLORIDE SERPL-SCNC: 100 MMOL/L (ref 96–106)
CHOLEST SERPL-MCNC: 144 MG/DL (ref 100–199)
CO2 SERPL-SCNC: 26 MMOL/L (ref 18–29)
CREAT SERPL-MCNC: 0.83 MG/DL (ref 0.57–1)
EOSINOPHIL # BLD AUTO: 0.1 X10E3/UL (ref 0–0.4)
EOSINOPHIL NFR BLD AUTO: 2 %
ERYTHROCYTE [DISTWIDTH] IN BLOOD BY AUTOMATED COUNT: 14.8 % (ref 12.3–15.4)
EST. AVERAGE GLUCOSE BLD GHB EST-MCNC: 120 MG/DL
GFR SERPLBLD CREATININE-BSD FMLA CKD-EPI: 64 ML/MIN/1.73
GFR SERPLBLD CREATININE-BSD FMLA CKD-EPI: 74 ML/MIN/1.73
GLOBULIN SER CALC-MCNC: 2.1 G/DL (ref 1.5–4.5)
GLUCOSE SERPL-MCNC: 103 MG/DL (ref 65–99)
HBA1C MFR BLD: 5.8 % (ref 4.8–5.6)
HCT VFR BLD AUTO: 39.6 % (ref 34–46.6)
HDLC SERPL-MCNC: 72 MG/DL
HGB BLD-MCNC: 13.1 G/DL (ref 11.1–15.9)
IMM GRANULOCYTES # BLD: 0 X10E3/UL (ref 0–0.1)
IMM GRANULOCYTES NFR BLD: 0 %
LDLC SERPL CALC-MCNC: 57 MG/DL (ref 0–99)
LYMPHOCYTES # BLD AUTO: 1.1 X10E3/UL (ref 0.7–3.1)
LYMPHOCYTES NFR BLD AUTO: 19 %
MCH RBC QN AUTO: 29.4 PG (ref 26.6–33)
MCHC RBC AUTO-ENTMCNC: 33.1 G/DL (ref 31.5–35.7)
MCV RBC AUTO: 89 FL (ref 79–97)
MONOCYTES # BLD AUTO: 0.5 X10E3/UL (ref 0.1–0.9)
MONOCYTES NFR BLD AUTO: 9 %
NEUTROPHILS # BLD AUTO: 3.9 X10E3/UL (ref 1.4–7)
NEUTROPHILS NFR BLD AUTO: 70 %
PLATELET # BLD AUTO: 220 X10E3/UL (ref 150–379)
POTASSIUM SERPL-SCNC: 4 MMOL/L (ref 3.5–5.2)
PROT SERPL-MCNC: 6.2 G/DL (ref 6–8.5)
RBC # BLD AUTO: 4.46 X10E6/UL (ref 3.77–5.28)
SODIUM SERPL-SCNC: 142 MMOL/L (ref 134–144)
TRIGL SERPL-MCNC: 76 MG/DL (ref 0–149)
TSH SERPL DL<=0.005 MIU/L-ACNC: 2.83 UIU/ML (ref 0.45–4.5)
VLDLC SERPL CALC-MCNC: 15 MG/DL (ref 5–40)
WBC # BLD AUTO: 5.6 X10E3/UL (ref 3.4–10.8)

## 2018-04-11 NOTE — PROGRESS NOTES
Excellent. All are stable and with in normal limits. Recheck labs in 3 months for routine monitoring.

## 2018-04-13 ENCOUNTER — OFFICE VISIT (OUTPATIENT)
Dept: GERIATRIC MEDICINE | Age: 83
End: 2018-04-13

## 2018-04-13 VITALS
BODY MASS INDEX: 20.39 KG/M2 | DIASTOLIC BLOOD PRESSURE: 58 MMHG | SYSTOLIC BLOOD PRESSURE: 139 MMHG | RESPIRATION RATE: 18 BRPM | TEMPERATURE: 97.8 F | OXYGEN SATURATION: 97 % | WEIGHT: 108 LBS | HEIGHT: 61 IN | HEART RATE: 68 BPM

## 2018-04-13 DIAGNOSIS — I10 ESSENTIAL HYPERTENSION: ICD-10-CM

## 2018-04-13 DIAGNOSIS — Z71.2 ENCOUNTER TO DISCUSS TEST RESULTS: ICD-10-CM

## 2018-04-13 DIAGNOSIS — M15.9 PRIMARY OSTEOARTHRITIS INVOLVING MULTIPLE JOINTS: Primary | ICD-10-CM

## 2018-04-13 DIAGNOSIS — E78.2 MIXED HYPERLIPIDEMIA: ICD-10-CM

## 2018-04-13 RX ORDER — CHROMIUM PICOLINATE 200 MCG
TABLET ORAL
COMMUNITY
End: 2018-07-25 | Stop reason: SDUPTHER

## 2018-04-13 NOTE — MR AVS SNAPSHOT
[facility] [address] [address] 
[phone] Patient: Jie Quintero MRN: [id number] BDC:[birth year] Visit Information Date & Time Provider Department Dept. Phone Encounter #  
 4/13/2018 10:30 AM William Bedolla, [address] [facility] [phone] [id number] Follow-up Instructions Return in about 3 months (around 7/13/2018). Follow-up and Disposition History Upcoming Health Maintenance Date Due  
 GLAUCOMA SCREENING Q2Y 1/23/2019 MEDICARE YEARLY EXAM 4/11/2019 DTaP/Tdap/Td series (2 - Td) 10/18/2027 Allergies as of 4/13/2018  Review Complete On: 4/13/2018 By: William Bedolla NP Severity Noted Reaction Type Reactions Latex  05/17/2016    Rash Orabase (Benzocaine) [Benzocaine] High 05/17/2016    Anaphylaxis Adhesive  05/17/2016    Rash Codeine  09/14/2011    Nausea and Vomiting Fran Alistair [Propoxyphene N-acetaminophen]  09/14/2011    Unknown (comments) Hydrocodone  09/14/2011    Unknown (comments) Myrbetriq [Mirabegron] Low 12/22/2017    Myalgia Reports leg cramps from the medication. Current Immunizations  Reviewed on 9/30/2011 Name Date Influenza Vaccine 10/1/2016, 10/1/2015 Influenza Vaccine Whole 9/29/2011 Pneumococcal Conjugate (PCV-13) 7/30/2015 Pneumococcal Polysaccharide (PPSV-23) 9/1/2008 Pneumococcal Vaccine (Unspecified Type) 10/1/1998 Td 9/30/2009 ZZZ-RETIRED (DO NOT USE) Pneumococcal Vaccine (Unspecified Type) 9/1/2008, 10/1/1998 Zoster Vaccine, Live 5/11/2012 Not reviewed this visit You Were Diagnosed With   
  
 Codes Comments Primary osteoarthritis involving multiple joints    -  Primary ICD-10-CM: M15.0 ICD-9-CM: 715.09 Essential hypertension     ICD-10-CM: I10 
ICD-9-CM: 401.9 Encounter to discuss test results     ICD-10-CM: Z71.89 ICD-9-CM: V65.49 Mixed hyperlipidemia     ICD-10-CM: E78.2 ICD-9-CM: 272.2 Vitals BP Pulse Temp Resp Height(growth percentile) Weight(growth percentile) 139/58 (BP 1 Location: Left arm, BP Patient Position: Sitting) 68 97.8 °F (36.6 °C) (Oral) 18 5' 1\" (1.549 m) 108 lb (49 kg) SpO2 BMI OB Status Smoking Status 97% 20.41 kg/m2 Hysterectomy Never Smoker Vitals History BMI and BSA Data Body Mass Index Body Surface Area  
 20.41 kg/m 2 1.45 m 2 Preferred Pharmacy Pharmacy Name Phone [facility], [address] [place] [address] [phone] Your Updated Medication List  
  
   
This list is accurate as of 4/13/18 11:59 PM.  Always use your most recent med list.  
  
  
  
  
 acetaminophen 325 mg tablet Commonly known as:  TYLENOL Take  by mouth every four (4) hours as needed for Pain. albuterol 90 mcg/actuation inhaler Commonly known as:  PROVENTIL HFA, VENTOLIN HFA, PROAIR HFA Take 1 Puff by inhalation every six (6) hours as needed for Wheezing or Shortness of Breath. Indications: BRONCHOSPASM PREVENTION  
  
 amoxicillin 500 mg capsule Commonly known as:  AMOXIL Take 500 mg by mouth. Take 4 caps one hour prior to dental appointments. Indications: prophylactic  
  
 aspirin delayed-release 81 mg tablet Take 1 Tab by mouth daily. atenolol 100 mg tablet Commonly known as:  TENORMIN Take 1 Tab by mouth daily. Indications: hypertension CALCIUM 600 WITH VITAMIN D3 600 mg(1,500mg) -400 unit Cap Generic drug:  Calcium-Cholecalciferol (D3) Take  by mouth.  
  
 carbamide peroxide 6.5 % otic solution Commonly known as:  Zara Lindo Administer 5 Drops in right ear two (2) times a week. clotrimazole-betamethasone topical cream  
Commonly known as:  Monica Mabeena Apply twice daily to affected area x 10 days. hydroCHLOROthiazide 12.5 mg tablet Commonly known as:  HYDRODIURIL Take 1 Tab by mouth daily. loperamide 2 mg capsule Commonly known as:  IMODIUM Take  by mouth.  
  
 metroNIDAZOLE 0.75 % lotion Commonly known as:  Lico [place] Apply  to affected area nightly. montelukast 10 mg tablet Commonly known as:  SINGULAIR Take 1 Tab by mouth daily. Indications: Allergic Rhinitis  
  
 peg 400-propylene glycol 0.4-0.3 % Drop Commonly known as:  SYSTANE Administer 1 Drop to both eyes four (4) times daily. simvastatin 5 mg tablet Commonly known as:  ZOCOR Take 1 Tab by mouth nightly. Follow-up Instructions Return in about 3 months (around 7/13/2018). Patient Instructions High Cholesterol: Care Instructions Your Care Instructions Cholesterol is a type of fat in your blood. It is needed for many body functions, such as making new cells. Cholesterol is made by your body. It also comes from food you eat. High cholesterol means that you have too much of the fat in your blood. This raises your risk of a heart attack and stroke. LDL and HDL are part of your total cholesterol. LDL is the \"bad\" cholesterol. High LDL can raise your risk for heart disease, heart attack, and stroke. HDL is the \"good\" cholesterol. It helps clear bad cholesterol from the body. High HDL is linked with a lower risk of heart disease, heart attack, and stroke. Your cholesterol levels help your doctor find out your risk for having a heart attack or stroke. You and your doctor can talk about whether you need to lower your risk and what treatment is best for you. A heart-healthy lifestyle along with medicines can help lower your cholesterol and your risk. The way you choose to lower your risk will depend on how high your risk is for heart attack and stroke. It will also depend on how you feel about taking medicines. Follow-up care is a key part of your treatment and safety.  Be sure to make and go to all appointments, and call your doctor if you are having problems. It's also a good idea to know your test results and keep a list of the medicines you take. How can you care for yourself at home? · Eat a variety of foods every day. Good choices include fruits, vegetables, whole grains (like oatmeal), dried beans and peas, nuts and seeds, soy products (like tofu), and fat-free or low-fat dairy products. · Replace butter, margarine, and hydrogenated or partially hydrogenated oils with olive and canola oils. (Canola oil margarine without trans fat is fine.) · Replace red meat with fish, poultry, and soy protein (like tofu). · Limit processed and packaged foods like chips, crackers, and cookies. · Bake, broil, or steam foods. Don't bocanegra them. · Be physically active. Get at least 30 minutes of exercise on most days of the week. Walking is a good choice. You also may want to do other activities, such as running, swimming, cycling, or playing tennis or team sports. · Stay at a healthy weight or lose weight by making the changes in eating and physical activity listed above. Losing just a small amount of weight, even 5 to 10 pounds, can reduce your risk for having a heart attack or stroke. · Do not smoke. When should you call for help? Watch closely for changes in your health, and be sure to contact your doctor if: 
? · You need help making lifestyle changes. ? · You have questions about your medicine. Where can you learn more? Go to http://andriy-francisco.info/. Enter L100 in the search box to learn more about \"High Cholesterol: Care Instructions. \" Current as of: September 21, 2016 Content Version: 11.4 © 8730-4309 [employer]. Care instructions adapted under license by [employer] (which disclaims liability or warranty for this information).  If you have questions about a medical condition or this instruction, always ask your healthcare professional. [employer] disclaims any warranty or liability for your use of this information. Introducing [facility]! [employer] introduces TradeGig patient portal. Now you can access parts of your medical record, email your doctor's office, and request medication refills online. 1. In your internet browser, go to [email or web address] 2. Click on the First Time User? Click Here link in the Sign In box. You will see the New Member Sign Up page. 3. Enter your TradeGig Access Code exactly as it appears below. You will not need to use this code after youve completed the sign-up process. If you do not sign up before the expiration date, you must request a new code. · TradeGig Access Code: [email or web address] Expires: 4/17/2018  5:49 PM 
 
4. Enter the last four digits of your Social Security Number (xxxx) and Date of Birth (mm/dd/yyyy) as indicated and click Submit. You will be taken to the next sign-up page. 5. Create a TradeGig ID. This will be your TradeGig login ID and cannot be changed, so think of one that is secure and easy to remember. 6. Create a TradeGig password. You can change your password at any time. 7. Enter your Password Reset Question and Answer. This can be used at a later time if you forget your password. 8. Enter your e-mail address. You will receive e-mail notification when new information is available in [address] 9. Click Sign Up. You can now view and download portions of your medical record. 10. Click the Download Summary menu link to download a portable copy of your medical information. If you have questions, please visit the Frequently Asked Questions section of the TradeGig website. Remember, TradeGig is NOT to be used for urgent needs. For medical emergencies, dial 911. Now available from your iPhone and Android! Please provide this summary of care documentation to your next provider. Your primary care clinician is listed as Anjana Shields. If you have any questions after today's visit, please call [phone].

## 2018-04-13 NOTE — PROGRESS NOTES
ADVISED PATIENT OF THE FOLLOWING HEALTH MAINTAINCE DUE  There are no preventive care reminders to display for this patient. Chief Complaint   Patient presents with    Results     Patient being seen by NP to review lab results. 1. Have you been to the ER, urgent care clinic since your last visit? Hospitalized since your last visit? NO   2. Have you seen or consulted any other health care providers outside of the [address] [place] [address] since your last visit? Include any DEXA scan, mammography  or colon screening. NO     3. Do you have an Advance Directive on file? YES 4. Do you have a DNR on file? DNR        Patient is accompanied by self I have received verbal consent from Crystal Greer to discuss any/all medical information while they are present in the room.

## 2018-04-16 PROBLEM — M15.9 PRIMARY OSTEOARTHRITIS INVOLVING MULTIPLE JOINTS: Status: ACTIVE | Noted: 2018-04-16

## 2018-04-16 RX ORDER — SIMVASTATIN 10 MG/1
5 TABLET, FILM COATED ORAL
Qty: 90 TAB | Refills: 1
Start: 2018-04-16 | End: 2018-04-16 | Stop reason: DRUGHIGH

## 2018-04-16 RX ORDER — SIMVASTATIN 5 MG/1
5 TABLET, FILM COATED ORAL
Qty: 90 TAB | Refills: 1
Start: 2018-04-16 | End: 2018-05-03 | Stop reason: SDUPTHER

## 2018-04-16 NOTE — PROGRESS NOTES
Reason for Visit:      Chief Complaint   Patient presents with    Results     Patient being seen by NP to review lab results. History of Present Illness: Ann Marie Bermeo is a 80 y.o. female geriatric patient who presents today to review her lab results from her routine labs that were drawn last week. She states she has no complaints at this time and reports she is feeling well. Reminded her of her wellness initiatives and she states she knows she has to have her mammogram completed in 1 year, see her eye dr, and get her teeth cleaned. Discussed her labs with her and they are all stable. Discussed trying to decrease her statin to try and help preserve her memory, she agrees as she does not have any cardiac history and is low risk for cardiac death. We will decrease the medication until she finishes the current supply and then do not refill it thereafter. She is in agreement. Educational reading material has been provided from [facility] 2017. Diagnosis/Treatment Plan: The following orders have been placed for treatment of the diagnoses above:    ICD-10-CM ICD-9-CM    1. Primary osteoarthritis involving multiple joints M15.0 715.09    2. Essential hypertension I10 401.9    3. Encounter to discuss test results Z71.89 V65.49 simvastatin (ZOCOR) 5 mg tablet   4. Mixed hyperlipidemia E78.2 272.2 simvastatin (ZOCOR) 5 mg tablet      DISCONTINUED: simvastatin (ZOCOR) 10 mg tablet     The following medication/treatments have been discontinued by the provider today:   Medications Discontinued During This Encounter   Medication Reason    aspirin 81 mg tablet Therapy Completed    guaiFENesin (ROBAFEN) 100 mg/5 mL liquid Therapy Completed    cholecalciferol (VITAMIN D3) 1,000 unit tablet Therapy Completed    calcium carbonate (CALCIUM 600) 600 mg calcium (4,247 mg) tablet Duplicate Order    simvastatin (ZOCOR) 10 mg tablet Reorder    simvastatin (ZOCOR) 10 mg tablet Dose Adjustment       Follow Up:     Follow-up Disposition: Not on File    Subjective: Allergies   Allergen Reactions    Latex Rash    Orabase (Benzocaine) [Benzocaine] Anaphylaxis    Adhesive Rash    Codeine Nausea and Vomiting    Darvocet A500 [Propoxyphene N-Acetaminophen] Unknown (comments)    Hydrocodone Unknown (comments)    Myrbetriq [Mirabegron] Myalgia     Reports leg cramps from the medication. Prior to Admission medications    Medication Sig Start Date End Date Taking? Authorizing Provider   simvastatin (ZOCOR) 5 mg tablet Take 1 Tab by mouth nightly. 4/16/18  Yes Karey De La Fuente NP   Calcium-Cholecalciferol, D3, (CALCIUM 600 WITH VITAMIN D3) 600 mg(1,500mg) -400 unit cap Take  by mouth. Yes Historical Provider   aspirin delayed-release 81 mg tablet Take 1 Tab by mouth daily. 3/20/18  Yes Karey De La Fuente NP   hydroCHLOROthiazide (HYDRODIURIL) 12.5 mg tablet Take 1 Tab by mouth daily. 3/20/18  Yes Karey De La Fuente NP   atenolol (TENORMIN) 100 mg tablet Take 1 Tab by mouth daily. Indications: hypertension 3/20/18  Yes Karey De La Fuente NP   albuterol (PROVENTIL HFA, VENTOLIN HFA, PROAIR HFA) 90 mcg/actuation inhaler Take 1 Puff by inhalation every six (6) hours as needed for Wheezing or Shortness of Breath. Indications: BRONCHOSPASM PREVENTION 1/15/18  Yes Karey De La Fuente NP   montelukast (SINGULAIR) 10 mg tablet Take 1 Tab by mouth daily. Indications: Allergic Rhinitis 1/12/18  Yes Karey De La Fuente NP   loperamide (IMODIUM) 2 mg capsule Take  by mouth. Yes Historical Provider   clotrimazole-betamethasone (LOTRISONE) topical cream Apply twice daily to affected area x 10 days. 1/11/17  Yes Iván Vegas NP   carbamide peroxide (DEBROX) 6.5 % otic solution Administer 5 Drops in right ear two (2) times a week. 1/6/17  Yes Iván Vegas NP   acetaminophen (TYLENOL) 325 mg tablet Take  by mouth every four (4) hours as needed for Pain.    Yes Historical Provider   peg 400-propylene glycol (SYSTANE) 0.4-0.3 % drop Administer 1 Drop to both eyes four (4) times daily. Yes Historical Provider   amoxicillin (AMOXIL) 500 mg capsule Take 500 mg by mouth. Take 4 caps one hour prior to dental appointments. Indications: prophylactic   Yes Historical Provider   metronidazole (METROLOTION) 0.75 % lotion Apply  to affected area nightly. Patient taking differently: Apply  to affected area every morning.  Indications: ACNE ROSACEA 4/18/12  Yes Val Chamberlain MD     Past Medical History:   Diagnosis Date    Allergic rhinitis     Arthritis     Benign hematuria     negative w/u by Dr. Phill Land Hematuria     Hypertension     Osteoarthritis     Osteopenia     Overactive bladder     Rosacea     Rosacea conjunctivitis(372.31)     Shingles     Stress incontinence       Past Surgical History:   Procedure Laterality Date    ENDOSCOPY, COLON, DIAGNOSTIC  7/2005    (Brent Aldridge)    HX CATARACT REMOVAL  7/2004    bilateral    HX ORTHOPAEDIC      toe surgery for bone spurs    HX IRENE AND BSO  1980    TOTAL HIP ARTHROPLASTY  2000    left    TOTAL HIP ARTHROPLASTY  2001    right      Social History   Substance Use Topics    Smoking status: Never Smoker    Smokeless tobacco: Never Used    Alcohol use No      Family History   Problem Relation Age of Onset    Stroke Mother      x2    Heart Attack Mother 61    Parkinsonism Father     Breast Cancer Sister     Hypertension Sister     Arthritis-osteo Sister     Heart defect Sister      MVP    Stroke Sister     Arthritis-osteo Sister     Hypertension Sister     Diabetes Sister     Breast Cancer Sister     Other Sister      polycythemia vera    Allergic Rhinitis Son     Hypertension Son     Headache Son     Allergic Rhinitis Daughter     Allergic Rhinitis Son       Latest Laboratory Results:     Office Visit on 04/10/2018   Component Date Value Ref Range Status    WBC 04/10/2018 5.6  3.4 - 10.8 x10E3/uL Final    RBC 04/10/2018 4.46  3.77 - 5.28 x10E6/uL Final    HGB 04/10/2018 13.1  11.1 - 15.9 g/dL Final    HCT 04/10/2018 39.6  34.0 - 46.6 % Final    MCV 04/10/2018 89  79 - 97 fL Final    MCH 04/10/2018 29.4  26.6 - 33.0 pg Final    MCHC 04/10/2018 33.1  31.5 - 35.7 g/dL Final    RDW 04/10/2018 14.8  12.3 - 15.4 % Final    PLATELET 02/77/3100 624  150 - 379 x10E3/uL Final    NEUTROPHILS 04/10/2018 70  Not Estab. % Final    Lymphocytes 04/10/2018 19  Not Estab. % Final    MONOCYTES 04/10/2018 9  Not Estab. % Final    EOSINOPHILS 04/10/2018 2  Not Estab. % Final    BASOPHILS 04/10/2018 0  Not Estab. % Final    ABS. NEUTROPHILS 04/10/2018 3.9  1.4 - 7.0 x10E3/uL Final    Abs Lymphocytes 04/10/2018 1.1  0.7 - 3.1 x10E3/uL Final    ABS. MONOCYTES 04/10/2018 0.5  0.1 - 0.9 x10E3/uL Final    ABS. EOSINOPHILS 04/10/2018 0.1  0.0 - 0.4 x10E3/uL Final    ABS. BASOPHILS 04/10/2018 0.0  0.0 - 0.2 x10E3/uL Final    IMMATURE GRANULOCYTES 04/10/2018 0  Not Estab. % Final    ABS. IMM. GRANS. 04/10/2018 0.0  0.0 - 0.1 x10E3/uL Final    Glucose 04/10/2018 103* 65 - 99 mg/dL Final    Comment: Specimen received in contact with cells. No visible hemolysis  present. However GLUC may be decreased and K increased. Clinical  correlation indicated.  BUN 04/10/2018 20  8 - 27 mg/dL Final    Creatinine 04/10/2018 0.83  0.57 - 1.00 mg/dL Final    GFR est non-AA 04/10/2018 64  >59 mL/min/1.73 Final    GFR est AA 04/10/2018 74  >59 mL/min/1.73 Final    BUN/Creatinine ratio 04/10/2018 24  12 - 28 Final    Sodium 04/10/2018 142  134 - 144 mmol/L Final    Potassium 04/10/2018 4.0  3.5 - 5.2 mmol/L Final    Comment: Specimen received in contact with cells. No visible hemolysis  present. However GLUC may be decreased and K increased. Clinical  correlation indicated.       Chloride 04/10/2018 100  96 - 106 mmol/L Final    CO2 04/10/2018 26  18 - 29 mmol/L Final    Calcium 04/10/2018 9.4  8.7 - 10.3 mg/dL Final    Protein, total 04/10/2018 6.2  6.0 - 8.5 g/dL Final    Albumin 04/10/2018 4.1  3.5 - 4.7 g/dL Final    GLOBULIN, TOTAL 04/10/2018 2.1  1.5 - 4.5 g/dL Final    A-G Ratio 04/10/2018 2.0  1.2 - 2.2 Final    Bilirubin, total 04/10/2018 0.4  0.0 - 1.2 mg/dL Final    Alk. phosphatase 04/10/2018 72  39 - 117 IU/L Final    AST (SGOT) 04/10/2018 33  0 - 40 IU/L Final    ALT (SGPT) 04/10/2018 25  0 - 32 IU/L Final    Hemoglobin A1c 04/10/2018 5.8* 4.8 - 5.6 % Final    Comment:          Pre-diabetes: 5.7 - 6.4           Diabetes: >6.4           Glycemic control for adults with diabetes: <7.0      Estimated average glucose 04/10/2018 120  mg/dL Final    Cholesterol, total 04/10/2018 144  100 - 199 mg/dL Final    Triglyceride 04/10/2018 76  0 - 149 mg/dL Final    HDL Cholesterol 04/10/2018 72  >39 mg/dL Final    VLDL, calculated 04/10/2018 15  5 - 40 mg/dL Final    LDL, calculated 04/10/2018 57  0 - 99 mg/dL Final    TSH 04/10/2018 2.830  0.450 - 4.500 uIU/mL Final    VITAMIN D, 25-HYDROXY 04/10/2018 45.1  30.0 - 100.0 ng/mL Final    Comment: Vitamin D deficiency has been defined by the [place] of  Medicine and an Endocrine Society practice guideline as a  level of serum 25-OH vitamin D less than 20 ng/mL (1,2). The Endocrine Society went on to further define vitamin D  insufficiency as a level between 21 and 29 ng/mL (2). 1. IOM ([place] of Medicine). 2010. Dietary reference     intakes for calcium and D. [facility]: The     [employer]. 2. Memo MF, Samuel REYES, Andrei MOSQUERA, et al.     Evaluation, treatment, and prevention of vitamin D     deficiency: an Endocrine Society clinical practice     guideline. JCEM. 2011 Jul; 96():1911-30.          Objective:   CODE STATUS: DNR  Vitals:    04/13/18 1024   BP: 139/58   Pulse: 68   Resp: 18   Temp: 97.8 °F (36.6 °C)   TempSrc: Oral   SpO2: 97%   Weight: 108 lb (49 kg)   Height: 5' 1\" (1.549 m)     Wt Readings from Last 3 Encounters:   04/13/18 108 lb (49 kg)   04/10/18 108 lb 14.4 oz (49.4 kg) 01/18/18 109 lb (49.4 kg)     BP Readings from Last 3 Encounters:   04/13/18 139/58   04/10/18 132/57   01/18/18 116/53     Review of Systems   Constitutional: Positive for fatigue. Negative for activity change, appetite change and fever. HENT: Positive for hearing loss and voice change. Negative for congestion, dental problem, postnasal drip, rhinorrhea, sinus pain, sinus pressure, sneezing, sore throat and trouble swallowing. Eyes: Negative for discharge, redness and visual disturbance. Respiratory: Negative for apnea, cough, chest tightness, shortness of breath and wheezing. Cardiovascular: Negative for chest pain, palpitations and leg swelling. Gastrointestinal: Negative for abdominal distention, abdominal pain, blood in stool, constipation, diarrhea, nausea and vomiting. Endocrine: Negative for polydipsia, polyphagia and polyuria. Genitourinary: Negative for flank pain, frequency and urgency. Musculoskeletal: Negative for arthralgias, back pain, gait problem, joint swelling and neck pain. Skin: Negative for color change, pallor, rash and wound. Allergic/Immunologic: Negative for environmental allergies and food allergies. Neurological: Negative for dizziness, tremors, weakness, light-headedness, numbness and headaches. Hematological: Negative for adenopathy. Psychiatric/Behavioral: Negative for agitation, confusion and sleep disturbance. The patient is not nervous/anxious. Physical Exam   Constitutional: She is oriented to person, place, and time and well-developed, well-nourished, and in no distress. Vital signs are normal. She appears to not be writhing in pain, not malnourished and not dehydrated. She appears healthy. She does not have a sickly appearance. No distress. Average size, elderly  female, alert and oriented x 3 appears to be feeling much better. In no apparent acute distress. HENT:   Head: Normocephalic and atraumatic.    Right Ear: Tympanic membrane, external ear and ear canal normal. Decreased hearing is noted. Left Ear: Tympanic membrane, external ear and ear canal normal. Decreased hearing is noted. Nose: Rhinorrhea present. No mucosal edema or sinus tenderness. Right sinus exhibits no frontal sinus tenderness. Left sinus exhibits no frontal sinus tenderness. Mouth/Throat: Uvula is midline. Mucous membranes are not pale, not dry and not cyanotic. No oral lesions. No uvula swelling. No posterior oropharyngeal edema or posterior oropharyngeal erythema. Eyes: Conjunctivae, EOM and lids are normal. Pupils are equal, round, and reactive to light. Lids are everted and swept, no foreign bodies found. Neck: Trachea normal, normal range of motion and full passive range of motion without pain. Neck supple. No hepatojugular reflux and no JVD present. Carotid bruit is not present. No thyromegaly present. Cardiovascular: Normal rate, regular rhythm, S1 normal, S2 normal, normal heart sounds, intact distal pulses and normal pulses. Occasional extrasystoles are present. Exam reveals no gallop and no friction rub. No murmur heard. No lower extremity edema present today. Pulmonary/Chest: Effort normal and breath sounds normal.   Abdominal: Soft. Normal appearance and bowel sounds are normal. She exhibits no distension, no fluid wave and no mass. There is no hepatosplenomegaly. There is no tenderness. There is no rebound and no CVA tenderness. Musculoskeletal: Normal range of motion. Lymphadenopathy:        Head (right side): No submandibular adenopathy present. Head (left side): No submandibular and no tonsillar adenopathy present. She has no cervical adenopathy. She has no axillary adenopathy. Neurological: She is alert and oriented to person, place, and time. She has normal motor skills, normal sensation, normal strength, normal reflexes and intact cranial nerves. She displays no weakness. She exhibits normal muscle tone. Gait normal. Coordination and gait normal.   Skin: Skin is warm, dry and intact. No bruising, no ecchymosis and no rash noted. She is not diaphoretic. No cyanosis. No pallor. Nails show no clubbing. Psychiatric: Mood, memory, affect and judgment normal.   Baseline mood and affect unchanged from normal.       Disclaimer:   Advised Karla Nj to call back or return to office if symptoms worsen/change/persist.  Discussed expected course/resolution/complications of diagnosis in detail with patient. Medication risks/benefits/costs/interactions/alternatives discussed with patient and she was given an after visit summary which includes diagnoses, current medications, & vitals. Karla Nj expressed understanding with the diagnosis and plan.

## 2018-04-16 NOTE — PATIENT INSTRUCTIONS
High Cholesterol: Care Instructions  Your Care Instructions    Cholesterol is a type of fat in your blood. It is needed for many body functions, such as making new cells. Cholesterol is made by your body. It also comes from food you eat. High cholesterol means that you have too much of the fat in your blood. This raises your risk of a heart attack and stroke. LDL and HDL are part of your total cholesterol. LDL is the \"bad\" cholesterol. High LDL can raise your risk for heart disease, heart attack, and stroke. HDL is the \"good\" cholesterol. It helps clear bad cholesterol from the body. High HDL is linked with a lower risk of heart disease, heart attack, and stroke. Your cholesterol levels help your doctor find out your risk for having a heart attack or stroke. You and your doctor can talk about whether you need to lower your risk and what treatment is best for you. A heart-healthy lifestyle along with medicines can help lower your cholesterol and your risk. The way you choose to lower your risk will depend on how high your risk is for heart attack and stroke. It will also depend on how you feel about taking medicines. Follow-up care is a key part of your treatment and safety. Be sure to make and go to all appointments, and call your doctor if you are having problems. It's also a good idea to know your test results and keep a list of the medicines you take. How can you care for yourself at home? · Eat a variety of foods every day. Good choices include fruits, vegetables, whole grains (like oatmeal), dried beans and peas, nuts and seeds, soy products (like tofu), and fat-free or low-fat dairy products. · Replace butter, margarine, and hydrogenated or partially hydrogenated oils with olive and canola oils. (Canola oil margarine without trans fat is fine.)  · Replace red meat with fish, poultry, and soy protein (like tofu). · Limit processed and packaged foods like chips, crackers, and cookies.   · Bake, broil, or steam foods. Don't bocanegra them. · Be physically active. Get at least 30 minutes of exercise on most days of the week. Walking is a good choice. You also may want to do other activities, such as running, swimming, cycling, or playing tennis or team sports. · Stay at a healthy weight or lose weight by making the changes in eating and physical activity listed above. Losing just a small amount of weight, even 5 to 10 pounds, can reduce your risk for having a heart attack or stroke. · Do not smoke. When should you call for help? Watch closely for changes in your health, and be sure to contact your doctor if:  ? · You need help making lifestyle changes. ? · You have questions about your medicine. Where can you learn more? Go to http://andriy-francisco.info/. Enter T256 in the search box to learn more about \"High Cholesterol: Care Instructions. \"  Current as of: September 21, 2016  Content Version: 11.4  © 9289-3241 [employer]. Care instructions adapted under license by [employer] (which disclaims liability or warranty for this information). If you have questions about a medical condition or this instruction, always ask your healthcare professional. [place] [address] any warranty or liability for your use of this information.

## 2018-05-03 DIAGNOSIS — Z71.2 ENCOUNTER TO DISCUSS TEST RESULTS: ICD-10-CM

## 2018-05-03 DIAGNOSIS — E78.2 MIXED HYPERLIPIDEMIA: ICD-10-CM

## 2018-05-03 RX ORDER — SIMVASTATIN 5 MG/1
5 TABLET, FILM COATED ORAL
Qty: 90 TAB | Refills: 1 | Status: SHIPPED | OUTPATIENT
Start: 2018-05-03 | End: 2018-10-16 | Stop reason: ALTCHOICE

## 2018-07-25 DIAGNOSIS — M19.90 OSTEOARTHRITIS, UNSPECIFIED OSTEOARTHRITIS TYPE, UNSPECIFIED SITE: Primary | ICD-10-CM

## 2018-07-25 RX ORDER — CHROMIUM PICOLINATE 200 MCG
1 TABLET ORAL DAILY
Qty: 90 CAP | Refills: 1 | Status: SHIPPED | OUTPATIENT
Start: 2018-07-25 | End: 2019-01-17 | Stop reason: SDUPTHER

## 2018-09-12 ENCOUNTER — TELEPHONE (OUTPATIENT)
Dept: GERIATRIC MEDICINE | Age: 83
End: 2018-09-12

## 2018-09-12 DIAGNOSIS — I10 HYPERTENSION, UNSPECIFIED TYPE: ICD-10-CM

## 2018-09-12 RX ORDER — HYDROCHLOROTHIAZIDE 12.5 MG/1
12.5 TABLET ORAL DAILY
Qty: 90 TAB | Refills: 1 | Status: SHIPPED | OUTPATIENT
Start: 2018-09-12 | End: 2018-12-17 | Stop reason: SDUPTHER

## 2018-09-12 NOTE — TELEPHONE ENCOUNTER
Please call her @ [phone] for new med order and refill on Hydrochlorothizde 12.5mg to [facility].

## 2018-09-12 NOTE — TELEPHONE ENCOUNTER
I called and spoke with patient she confirmed that she needs a refill of the hydrochlorothiazide 12.5 mg

## 2018-10-10 ENCOUNTER — CLINICAL SUPPORT (OUTPATIENT)
Dept: GERIATRIC MEDICINE | Age: 83
End: 2018-10-10

## 2018-10-10 DIAGNOSIS — I10 ESSENTIAL HYPERTENSION: Chronic | ICD-10-CM

## 2018-10-10 DIAGNOSIS — R73.03 PREDIABETES: ICD-10-CM

## 2018-10-10 DIAGNOSIS — E55.9 VITAMIN D DEFICIENCY: ICD-10-CM

## 2018-10-10 DIAGNOSIS — E78.2 MIXED HYPERLIPIDEMIA: Primary | ICD-10-CM

## 2018-10-10 NOTE — PROGRESS NOTES
Christin Sanchez presents for lab draw ordered by Esther Mccracken RN MSN [facility]-AG-NP    The following labs were drawn and sent to lab by Love Mcadams LPN:    CBC, CMP, MCL4D and TSH reflex T4, Lipid panel, Vitamin D.      The following tubes were sent:    Rondal Bolds  ( 1) and Tiger(1)    Patient tolerated procedure well, blood obtained via venipuncture to left antecubital

## 2018-10-11 LAB
25(OH)D3+25(OH)D2 SERPL-MCNC: 42.8 NG/ML (ref 30–100)
ALBUMIN SERPL-MCNC: 4.3 G/DL (ref 3.5–4.7)
ALBUMIN/GLOB SERPL: 1.9 {RATIO} (ref 1.2–2.2)
ALP SERPL-CCNC: 75 IU/L (ref 39–117)
ALT SERPL-CCNC: 17 IU/L (ref 0–32)
AST SERPL-CCNC: 33 IU/L (ref 0–40)
BASOPHILS # BLD AUTO: 0 X10E3/UL (ref 0–0.2)
BASOPHILS NFR BLD AUTO: 1 %
BILIRUB SERPL-MCNC: 0.5 MG/DL (ref 0–1.2)
BUN SERPL-MCNC: 20 MG/DL (ref 8–27)
BUN/CREAT SERPL: 26 (ref 12–28)
CALCIUM SERPL-MCNC: 9.9 MG/DL (ref 8.7–10.3)
CHLORIDE SERPL-SCNC: 99 MMOL/L (ref 96–106)
CHOLEST SERPL-MCNC: 162 MG/DL (ref 100–199)
CO2 SERPL-SCNC: 25 MMOL/L (ref 20–29)
CREAT SERPL-MCNC: 0.78 MG/DL (ref 0.57–1)
EOSINOPHIL # BLD AUTO: 0 X10E3/UL (ref 0–0.4)
EOSINOPHIL NFR BLD AUTO: 1 %
ERYTHROCYTE [DISTWIDTH] IN BLOOD BY AUTOMATED COUNT: 13.7 % (ref 12.3–15.4)
EST. AVERAGE GLUCOSE BLD GHB EST-MCNC: 120 MG/DL
GLOBULIN SER CALC-MCNC: 2.3 G/DL (ref 1.5–4.5)
GLUCOSE SERPL-MCNC: 93 MG/DL (ref 65–99)
HBA1C MFR BLD: 5.8 % (ref 4.8–5.6)
HCT VFR BLD AUTO: 39.9 % (ref 34–46.6)
HDLC SERPL-MCNC: 78 MG/DL
HGB BLD-MCNC: 13.5 G/DL (ref 11.1–15.9)
IMM GRANULOCYTES # BLD: 0 X10E3/UL (ref 0–0.1)
IMM GRANULOCYTES NFR BLD: 0 %
LDLC SERPL CALC-MCNC: 69 MG/DL (ref 0–99)
LYMPHOCYTES # BLD AUTO: 1.3 X10E3/UL (ref 0.7–3.1)
LYMPHOCYTES NFR BLD AUTO: 22 %
MCH RBC QN AUTO: 30.1 PG (ref 26.6–33)
MCHC RBC AUTO-ENTMCNC: 33.8 G/DL (ref 31.5–35.7)
MCV RBC AUTO: 89 FL (ref 79–97)
MONOCYTES # BLD AUTO: 0.5 X10E3/UL (ref 0.1–0.9)
MONOCYTES NFR BLD AUTO: 9 %
NEUTROPHILS # BLD AUTO: 4 X10E3/UL (ref 1.4–7)
NEUTROPHILS NFR BLD AUTO: 67 %
PLATELET # BLD AUTO: 215 X10E3/UL (ref 150–379)
POTASSIUM SERPL-SCNC: 4.8 MMOL/L (ref 3.5–5.2)
PROT SERPL-MCNC: 6.6 G/DL (ref 6–8.5)
RBC # BLD AUTO: 4.49 X10E6/UL (ref 3.77–5.28)
SODIUM SERPL-SCNC: 143 MMOL/L (ref 134–144)
TRIGL SERPL-MCNC: 77 MG/DL (ref 0–149)
TSH SERPL DL<=0.005 MIU/L-ACNC: 2.13 UIU/ML (ref 0.45–4.5)
VLDLC SERPL CALC-MCNC: 15 MG/DL (ref 5–40)
WBC # BLD AUTO: 5.8 X10E3/UL (ref 3.4–10.8)

## 2018-10-16 ENCOUNTER — OFFICE VISIT (OUTPATIENT)
Dept: GERIATRIC MEDICINE | Age: 83
End: 2018-10-16

## 2018-10-16 VITALS
WEIGHT: 114.9 LBS | RESPIRATION RATE: 18 BRPM | HEART RATE: 65 BPM | TEMPERATURE: 97.8 F | BODY MASS INDEX: 21.69 KG/M2 | DIASTOLIC BLOOD PRESSURE: 63 MMHG | SYSTOLIC BLOOD PRESSURE: 139 MMHG | HEIGHT: 61 IN | OXYGEN SATURATION: 97 %

## 2018-10-16 DIAGNOSIS — K90.9: ICD-10-CM

## 2018-10-16 DIAGNOSIS — N81.10 FEMALE BLADDER PROLAPSE, ACQUIRED: ICD-10-CM

## 2018-10-16 DIAGNOSIS — R19.8 ALTERNATING CONSTIPATION AND DIARRHEA: ICD-10-CM

## 2018-10-16 DIAGNOSIS — N32.81 OAB (OVERACTIVE BLADDER): ICD-10-CM

## 2018-10-16 DIAGNOSIS — R15.9 INCONTINENCE OF FECES WITH FECAL URGENCY: ICD-10-CM

## 2018-10-16 DIAGNOSIS — N32.81 URGENCY-FREQUENCY SYNDROME: ICD-10-CM

## 2018-10-16 DIAGNOSIS — R15.2 INCONTINENCE OF FECES WITH FECAL URGENCY: ICD-10-CM

## 2018-10-16 DIAGNOSIS — R19.5 MUCOUS IN STOOLS: ICD-10-CM

## 2018-10-16 DIAGNOSIS — R10.11 RIGHT UPPER QUADRANT ABDOMINAL PAIN: ICD-10-CM

## 2018-10-16 DIAGNOSIS — K59.1 FUNCTIONAL DIARRHEA: ICD-10-CM

## 2018-10-16 DIAGNOSIS — R19.8 CHANGE IN BOWEL FUNCTION: ICD-10-CM

## 2018-10-16 DIAGNOSIS — K59.1 FUNCTIONAL DIARRHEA: Primary | ICD-10-CM

## 2018-10-16 NOTE — PROGRESS NOTES
ADVISED PATIENT OF THE FOLLOWING HEALTH MAINTAINCE DUE  Health Maintenance Due   Topic Date Due    Shingrix Vaccine Age 49> (1 of 2) 08/10/1981      Chief Complaint   Patient presents with    Results     Discuss recent lab results.  Urinary Frequency     She reports that she can not leaver her hosue as she uses the bathroom all thru the day both urination and having bowelmovements. This is limiting her to her house. 1. Have you been to the ER, urgent care clinic since your last visit? Hospitalized since your last visit? No    2. Have you seen or consulted any other health care providers outside of the [address] [place] [address] since your last visit? Include any DEXA scan, mammography  or colon screening. No    3. Do you have an Advance Directive on file? yes    4. Do you have a DNR on file? DNR        Patient is accompanied by self I have received verbal consent from Cordell Stratton to discuss any/all medical information while they are present in the room. Advance Care Planning 10/16/2018   Patient's Healthcare Decision Maker is: Named in scanned ACP document   Primary Decision Maker Name Cassidy Villafuerte   Primary Decision Maker Phone Number [phone]   Primary Decision Maker Relationship to Patient Adult child   Confirm Advance Directive Yes, on file   Does the patient have other document types Do Not Resuscitate; Power of [facility] [address], [address] [place], [place] [address]Y AT [address] [place] [address] of [address] [facility]  [address] [address]  Phone: [phone] Fax: [phone]

## 2018-10-16 NOTE — ACP (ADVANCE CARE PLANNING)
Advance Care Planning 10/16/2018   Patient's Healthcare Decision Maker is: Named in scanned ACP document   Primary Decision Maker Name Darwin Singh   Primary Decision Maker Phone Number [phone]   Primary Decision Maker Relationship to Patient Adult child   Confirm Advance Directive Yes, on file   Does the patient have other document types Do Not Resuscitate; Power of [employer]

## 2018-10-16 NOTE — PATIENT INSTRUCTIONS
Fecal Incontinence: Care Instructions  Your Care Instructions  Fecal incontinence is the loss of normal control of your bowels. You may not be able to reach the toilet in time for a bowel movement, or stool may leak from your anus. Fecal incontinence can be caused by constipation, diarrhea, or anxiety or other emotional stress. It can also result from nerve injury, muscle damage (especially from childbirth), lack of exercise, or poor diet. Treatment of fecal incontinence depends on what caused it and how bad it is. It may include changes to your diet, medicine, bowel training, or surgery. More than one treatment may be needed. Loss of bowel control can be hard to deal with. You may feel ashamed or embarrassed, and you may not want to leave the house because you fear that you might have an accident in public. But treatment can help you better control your bowels and manage your incontinence. Follow-up care is a key part of your treatment and safety. Be sure to make and go to all appointments, and call your doctor if you are having problems. It's also a good idea to know your test results and keep a list of the medicines you take. How can you care for yourself at home? · Keep a food diary of what you eat. This will help you learn which foods make your incontinence worse. · Eat small, frequent meals. Large meals may cause diarrhea. · Avoid constipation:  ¨ Include fruits, vegetables, beans, and whole grains in your diet each day. These foods are high in fiber. ¨ Drink plenty of fluids, enough so that your urine is light yellow or clear like water. If you have kidney, heart, or liver disease and have to limit fluids, talk with your doctor before you increase the amount of fluids you drink. ¨ Get some exercise every day. Build up slowly to 30 to 60 minutes a day on 5 or more days of the week. ¨ Take a fiber supplement, such as Citrucel or Metamucil, every day if needed.  Read and follow all instructions on the label. ¨ Schedule time each day for a bowel movement. Having a daily routine may help. Take your time and do not strain when having a bowel movement. · Take your medicines exactly as prescribed. Call your doctor if you think you are having a problem with your medicine. You will get more details on the specific medicines your doctor prescribes. · Do pelvic floor (Kegel) exercises, which tighten and strengthen the pelvic muscles. To do Kegel exercises:  ¨ Squeeze the same muscles you would use to stop your urine. Your belly and thighs should not move. ¨ Hold the squeeze for 3 seconds, and then relax for 3 seconds. ¨ Start with 3 seconds. Then add 1 second each week until you are able to squeeze for 10 seconds. ¨ Repeat the exercise 10 to 15 times a session. Do three or more sessions a day. When should you call for help? Call your doctor now or seek immediate medical care if:    · You have new or worse pain.     · You have new or worse bleeding from the rectum.    Watch closely for changes in your health, and be sure to contact your doctor if:    · You cannot pass stools or gas.     · You do not get better as expected. Where can you learn more? Go to http://andriy-francisco.info/. Enter D507 in the search box to learn more about \"Fecal Incontinence: Care Instructions. \"  Current as of: March 28, 2018  Content Version: 11.8  © 1548-6361 Healthwise, [employer]. Care instructions adapted under license by [employer] (which disclaims liability or warranty for this information). If you have questions about a medical condition or this instruction, always ask your healthcare professional. [place] [address] any warranty or liability for your use of this information. Cystocele: Care Instructions  Your Care Instructions    Cystocele happens when the bladder sags or presses into the vagina. It is also called bladder prolapse.   Pregnancy, pelvic surgery, or being overweight may damage the muscles and other support tissues in your pelvis. Or the muscles and tissues may get weaker as you age. These can make the bladder sag. This may cause uncomfortable pressure in your vagina. A cystocele usually does not cause serious health problems. You may find relief by making lifestyle changes and doing exercises to make the pelvic muscles stronger. Talk to your doctor about steps you can take to reduce symptoms. If your symptoms go on, you may want to talk to your doctor about surgery. Follow-up care is a key part of your treatment and safety. Be sure to make and go to all appointments, and call your doctor if you are having problems. It's also a good idea to know your test results and keep a list of the medicines you take. How can you care for yourself at home? · Do not lift heavy objects or do anything that puts pressure on your pelvic muscles. · Do pelvic floor (Kegel) exercises. These tighten and strengthen pelvic muscles. ¨ Squeeze the same muscles you would use to stop your urine. Your belly and thighs should not move. ¨ Hold the squeeze for 3 seconds. Then relax for 3 seconds. ¨ Start with 3 seconds. Then add 1 second each week until you are able to squeeze for 10 seconds. ¨ Repeat the exercise 10 to 15 times in each session. Do 3 or more sessions a day. · Lie down and put a pillow under your knees. This eases pressure on your vagina. You also can lie on your side and bring your knees up to your chest.  · Ask your doctor about a vaginal pessary. You can place this in your vagina. It supports the bladder. Your doctor can teach you how and when to remove, clean, and reinsert it. · If your doctor prescribes vaginal estrogen cream, use it exactly as prescribed. When should you call for help? Watch closely for changes in your health, and be sure to contact your doctor if you have any problems. Where can you learn more?   Go to http://andriy-francisco.info/. Enter S624 in the search box to learn more about \"Cystocele: Care Instructions. \"  Current as of: May 15, 2018  Content Version: 11.8  © 2298-9409 Healthwise, [employer]. Care instructions adapted under license by [employer] (which disclaims liability or warranty for this information). If you have questions about a medical condition or this instruction, always ask your healthcare professional. [place] [address] any warranty or liability for your use of this information.

## 2018-10-16 NOTE — PROGRESS NOTES
Reason for Visit/HPI:    Zoe Singh is a 80 y.o. female patient who presents today for   Chief Complaint   Patient presents with    Results     Discuss recent lab results.  Urinary Frequency     She reports that she can not leaver her hosue as she uses the bathroom all thru the day both urination and having bowelmovements. This is limiting her to her house. Diagnosis/Treatment Plan:    Diagnoses and all orders for this visit:    1. Functional diarrhea ; Mrs. Vesta Hinkle states since her visit in April 2018 she has started to have urgency without much warning of \"loose\" stools. She states she does not want to call it diarrhea but she is going multiple times per hour and up throughout the night. She has secluded herself to her apartment due to not having any warning and the frequency of her bowel movements. She also states she has prolapsed bladder issues and has to manually push her bladder back inside of herself. She also explains that most of her trips the bathroom have bowel movements and urination results. She denies seeing any blood in the stool. She does report having a yellowish mucous in her BM's as well as when she wipes it is sometimes on her underwear. She does state she has had smearing of stool in her underwear as well. This has become a social issue for Mrs. Vesta Hinkle as well as a quality of life issue. Discussed the possible differentials for diagnosis and the testing involved for them. She has agreed to start with the more in depth blood tests and some stool studies as well as she is allowing me to order a CT ABD/PELVIS to see if there is a simple reason for the change in her bowel habits. She has not lost weight, she has actually gained almost 7 lbs. I have given her some reading on bowel disorders as well as tips on trying to adjust her diet to help her regain some control over the bowels. If the tests above are negative I will send her to Dr. Xander Graham with [facility] for consult.    - AMYLASE  -     LIPASE  -     C REACTIVE PROTEIN, QT  -     SED RATE (ESR)  -     CALPROTECTIN, FECAL; Future  -     INFLAMMATORY BOWEL DISEASE (IBD) EXPANDED PROFILE  -     CELIAC DISEASE PROFILE (REFLEX TTG, IGG)  -     FECAL FAT/MUSCLE FIBER  -     LACTOFERRIN, FECAL  -     BOWEL DISORDERS CASCADE  -     COLLECTION VENOUS BLOOD,VENIPUNCTURE  -     CT ABD PELV W WO CONT; Future    2. Change in bowel function  -     AMYLASE  -     LIPASE  -     C REACTIVE PROTEIN, QT  -     SED RATE (ESR)  -     CALPROTECTIN, FECAL; Future  -     INFLAMMATORY BOWEL DISEASE (IBD) EXPANDED PROFILE  -     CELIAC DISEASE PROFILE (REFLEX TTG, IGG)  -     FECAL FAT/MUSCLE FIBER  -     LACTOFERRIN, FECAL  -     BOWEL DISORDERS CASCADE  -     COLLECTION VENOUS BLOOD,VENIPUNCTURE  -     CT ABD PELV W WO CONT; Future    3. Incontinence of feces with fecal urgency  -     AMYLASE  -     LIPASE  -     C REACTIVE PROTEIN, QT  -     SED RATE (ESR)  -     CALPROTECTIN, FECAL; Future  -     INFLAMMATORY BOWEL DISEASE (IBD) EXPANDED PROFILE  -     CELIAC DISEASE PROFILE (REFLEX TTG, IGG)  -     FECAL FAT/MUSCLE FIBER  -     LACTOFERRIN, FECAL  -     BOWEL DISORDERS CASCADE  -     COLLECTION VENOUS BLOOD,VENIPUNCTURE  -     CT ABD PELV W WO CONT; Future    4. Mucous in stools  -     AMYLASE  -     LIPASE  -     C REACTIVE PROTEIN, QT  -     SED RATE (ESR)  -     CALPROTECTIN, FECAL; Future  -     INFLAMMATORY BOWEL DISEASE (IBD) EXPANDED PROFILE  -     CELIAC DISEASE PROFILE (REFLEX TTG, IGG)  -     FECAL FAT/MUSCLE FIBER  -     LACTOFERRIN, FECAL  -     BOWEL DISORDERS CASCADE  -     COLLECTION VENOUS BLOOD,VENIPUNCTURE  -     CT ABD PELV W WO CONT; Future    5. Alternating constipation and diarrhea  -     AMYLASE  -     LIPASE  -     C REACTIVE PROTEIN, QT  -     SED RATE (ESR)  -     CALPROTECTIN, FECAL;  Future  -     INFLAMMATORY BOWEL DISEASE (IBD) EXPANDED PROFILE  -     CELIAC DISEASE PROFILE (REFLEX TTG, IGG)  -     FECAL FAT/MUSCLE FIBER  -     LACTOFERRIN, FECAL  -     BOWEL DISORDERS CASCADE  -     COLLECTION VENOUS BLOOD,VENIPUNCTURE  -     CT ABD PELV W WO CONT; Future    6. Bowel movements with increased fat  -     AMYLASE  -     LIPASE  -     C REACTIVE PROTEIN, QT  -     SED RATE (ESR)  -     CALPROTECTIN, FECAL; Future  -     INFLAMMATORY BOWEL DISEASE (IBD) EXPANDED PROFILE  -     CELIAC DISEASE PROFILE (REFLEX TTG, IGG)  -     FECAL FAT/MUSCLE FIBER  -     LACTOFERRIN, FECAL  -     BOWEL DISORDERS CASCADE  -     COLLECTION VENOUS BLOOD,VENIPUNCTURE  -     CT ABD PELV W WO CONT; Future    7. OAB (overactive bladder)    8. Female bladder prolapse, acquired    9. 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  -     AMYLASE  -     LIPASE  -     C REACTIVE PROTEIN, QT  -     SED RATE (ESR)  -     CALPROTECTIN, FECAL; Future  -     INFLAMMATORY BOWEL DISEASE (IBD) EXPANDED PROFILE  -     CELIAC DISEASE PROFILE (REFLEX TTG, IGG)  -     FECAL FAT/MUSCLE FIBER  -     LACTOFERRIN, FECAL  -     BOWEL DISORDERS CASCADE  -     COLLECTION VENOUS BLOOD,VENIPUNCTURE  -     CT ABD PELV W WO CONT; Future        Discontinued Care:     The following treatment modalities have been discontinued by the provider today:   Medications Discontinued During This Encounter   Medication Reason    montelukast (SINGULAIR) 10 mg tablet Not A Current Medication    albuterol (PROVENTIL HFA, VENTOLIN HFA, PROAIR HFA) 90 mcg/actuation inhaler Not A Current Medication    clotrimazole-betamethasone (LOTRISONE) topical cream Not A Current Medication    metronidazole (METROLOTION) 0.75 % lotion Therapy Completed    simvastatin (ZOCOR) 5 mg tablet Therapy Completed       Follow Up: Follow-up Disposition:  Return in about 1 week (around 10/23/2018) for after testing is completed to review the results. Subjective: Allergies   Allergen Reactions    Latex Rash    Orabase (Benzocaine) [Benzocaine] Anaphylaxis    Adhesive Rash    Codeine Nausea and Vomiting    Darvocet A500 [Propoxyphene N-Acetaminophen] Unknown (comments)    Hydrocodone Unknown (comments)    Myrbetriq [Mirabegron] Myalgia     Reports leg cramps from the medication. Prior to Admission medications    Medication Sig Start Date End Date Taking? Authorizing Provider   hydroCHLOROthiazide (HYDRODIURIL) 12.5 mg tablet Take 1 Tab by mouth daily. 9/12/18  Yes Emory Macdonald NP   Calcium-Cholecalciferol, D3, (CALCIUM 600 WITH VITAMIN D3) 600 mg(1,500mg) -400 unit cap Take 1 Tab by mouth daily. Indications: Osteoporosis 7/25/18  Yes Emory Macdonald NP   aspirin delayed-release 81 mg tablet Take 1 Tab by mouth daily. 3/20/18  Yes Emory Macdonald NP   atenolol (TENORMIN) 100 mg tablet Take 1 Tab by mouth daily. Indications: hypertension 3/20/18  Yes Emory Macdonald NP   loperamide (IMODIUM) 2 mg capsule Take 2 mg by mouth every other day. Yes Historical Provider   carbamide peroxide (DEBROX) 6.5 % otic solution Administer 5 Drops in right ear two (2) times a week. 1/6/17  Yes Yrn Bui NP   acetaminophen (TYLENOL) 325 mg tablet Take  by mouth every four (4) hours as needed for Pain. Yes Historical Provider   peg 400-propylene glycol (SYSTANE) 0.4-0.3 % drop Administer 1 Drop to both eyes four (4) times daily. Yes Historical Provider   amoxicillin (AMOXIL) 500 mg capsule Take 500 mg by mouth. Take 4 caps one hour prior to dental appointments.   Indications: prophylactic   Yes Historical Provider     Past Medical History:   Diagnosis Date    Allergic rhinitis     Arthritis     Benign hematuria     negative w/u by Dr. Tennille Gerard Hematuria     Hypertension     Osteoarthritis     Osteopenia     Overactive bladder     Rosacea     Rosacea conjunctivitis(372.31)     Shingles     Stress incontinence      Past Surgical History:   Procedure Laterality Date    ENDOSCOPY, COLON, DIAGNOSTIC  7/2005    ([address])    HX CATARACT REMOVAL  7/2004    bilateral    HX ORTHOPAEDIC      toe surgery for bone spurs    HX IRENE AND BSO  1980    TOTAL HIP ARTHROPLASTY  2000    left    TOTAL HIP ARTHROPLASTY  2001    right      Social History   Substance Use Topics    Smoking status: Never Smoker    Smokeless tobacco: Never Used    Alcohol use No      Family History   Problem Relation Age of Onset    Stroke Mother      x2    Heart Attack Mother 61    Parkinsonism Father     Breast Cancer Sister     Hypertension Sister     Arthritis-osteo Sister     Heart defect Sister      MVP    Stroke Sister     Arthritis-osteo Sister     Hypertension Sister     Diabetes Sister     Breast Cancer Sister     Other Sister      polycythemia vera    Allergic Rhinitis Son     Hypertension Son     Headache Son     Allergic Rhinitis Daughter     Allergic Rhinitis Son      Advance Care Planning 10/16/2018   Patient's Healthcare Decision Maker is: Named in scanned ACP document   Primary Decision Maker Name Norman Custcolette   Primary Decision Maker Phone Number [phone]   Primary Decision Maker Relationship to Patient Adult child   Confirm Advance Directive Yes, on file   Does the patient have other document types Do Not Resuscitate; Power of [employer] Results:     Lab Results   Component Value Date/Time    WBC 5.8 10/10/2018 11:19 AM    HGB 13.5 10/10/2018 11:19 AM    HCT 39.9 10/10/2018 11:19 AM    PLATELET 758 30/43/3068 11:19 AM    MCV 89 10/10/2018 11:19 AM     Lab Results   Component Value Date/Time    Sodium 143 10/10/2018 11:19 AM    Potassium 4.8 10/10/2018 11:19 AM    Chloride 99 10/10/2018 11:19 AM    CO2 25 10/10/2018 11:19 AM    Anion gap 11 09/29/2010 11:00 AM    Glucose 93 10/10/2018 11:19 AM    BUN 20 10/10/2018 11:19 AM    Creatinine 0.78 10/10/2018 11:19 AM    BUN/Creatinine ratio 26 10/10/2018 11:19 AM    GFR est AA 79 10/10/2018 11:19 AM    GFR est non-AA 69 10/10/2018 11:19 AM    Calcium 9.9 10/10/2018 11:19 AM    Bilirubin, total 0.5 10/10/2018 11:19 AM    AST (SGOT) 33 10/10/2018 11:19 AM    Alk. phosphatase 75 10/10/2018 11:19 AM    Protein, total 6.6 10/10/2018 11:19 AM    Albumin 4.3 10/10/2018 11:19 AM    Globulin 3.3 09/29/2010 11:00 AM    A-G Ratio 1.9 10/10/2018 11:19 AM    ALT (SGPT) 17 10/10/2018 11:19 AM     Lab Results   Component Value Date/Time    TSH 2.130 10/10/2018 11:19 AM    TSH 2.240 10/18/2017 12:20 PM     Lab Results   Component Value Date/Time    Cholesterol, total 162 10/10/2018 11:19 AM    HDL Cholesterol 78 10/10/2018 11:19 AM    LDL, calculated 69 10/10/2018 11:19 AM    VLDL, calculated 15 10/10/2018 11:19 AM    Triglyceride 77 10/10/2018 11:19 AM    CHOL/HDL Ratio 2.0 09/29/2010 11:00 AM     Objective:     Vitals:    10/16/18 1102   BP: 139/63   Pulse: 65   Resp: 18   Temp: 97.8 °F (36.6 °C)   TempSrc: Oral   SpO2: 97%   Weight: 114 lb 14.4 oz (52.1 kg)   Height: 5' 1\" (1.549 m)     Wt Readings from Last 3 Encounters:   10/16/18 114 lb 14.4 oz (52.1 kg)   04/13/18 108 lb (49 kg)   04/10/18 108 lb 14.4 oz (49.4 kg)     BP Readings from Last 3 Encounters:   10/16/18 139/63   04/13/18 139/58   04/10/18 132/57     Review of Systems   Constitutional: Positive for fatigue. Negative for activity change, appetite change and fever. HENT: Positive for hearing loss. Negative for congestion, dental problem, postnasal drip, rhinorrhea, sinus pain, sinus pressure, sneezing, sore throat, trouble swallowing and voice change. Eyes: Negative for discharge, redness and visual disturbance.    Respiratory: Negative for apnea, cough, chest tightness, shortness of breath and wheezing. Cardiovascular: Negative for chest pain, palpitations and leg swelling. Gastrointestinal: Positive for abdominal distention, abdominal pain, constipation, diarrhea and rectal pain. Negative for blood in stool, nausea and vomiting. Endocrine: Negative for polydipsia, polyphagia and polyuria. Genitourinary: Negative for flank pain, frequency and urgency. Musculoskeletal: Positive for arthralgias, gait problem, joint swelling and myalgias. Negative for back pain and neck pain. Skin: Negative for color change, pallor, rash and wound. Allergic/Immunologic: Negative for environmental allergies and food allergies. Neurological: Negative for dizziness, tremors, weakness, light-headedness, numbness and headaches. Hematological: Negative for adenopathy. Psychiatric/Behavioral: Negative for agitation, confusion and sleep disturbance. The patient is not nervous/anxious. Physical Exam   Constitutional: Vital signs are normal. She appears malnourished and dehydrated. She appears to not be writhing in pain. She appears unhealthy. She does not have a sickly appearance. No distress. Frail, fragile, elderly  female, alert and oriented x 2 appears to be exhausted and states she has not been leaving her apartment due to her bowels. HENT:   Head: Normocephalic and atraumatic. Right Ear: Tympanic membrane, external ear and ear canal normal. Decreased hearing is noted. Left Ear: Tympanic membrane, external ear and ear canal normal. Decreased hearing is noted. Nose: No mucosal edema, rhinorrhea or sinus tenderness. Right sinus exhibits no frontal sinus tenderness. Left sinus exhibits no frontal sinus tenderness. Mouth/Throat: Uvula is midline. Mucous membranes are not pale, not dry and not cyanotic. No oral lesions. No uvula swelling. No posterior oropharyngeal edema or posterior oropharyngeal erythema.    Eyes: Conjunctivae, EOM and lids are normal. Pupils are equal, round, and reactive to light. Lids are everted and swept, no foreign bodies found. Neck: Trachea normal, normal range of motion and full passive range of motion without pain. Neck supple. No hepatojugular reflux and no JVD present. Carotid bruit is not present. No thyromegaly present. Cardiovascular: Normal rate, regular rhythm, S1 normal, S2 normal, normal heart sounds, intact distal pulses and normal pulses. Occasional extrasystoles are present. Exam reveals no gallop and no friction rub. No murmur heard. No lower extremity edema present today. Pulmonary/Chest: Effort normal and breath sounds normal.   Abdominal: Soft. Normal appearance. She exhibits no distension, no fluid wave and no mass. Bowel sounds are hyperactive. There is no hepatosplenomegaly. There is tenderness in the right upper quadrant, right lower quadrant, epigastric area and periumbilical area. There is no rebound and no CVA tenderness. Musculoskeletal: Normal range of motion. Chronic muscular weakness generalized, chronic joint disfiguration due to arthritis. Lymphadenopathy:        Head (right side): No submandibular adenopathy present. Head (left side): No submandibular and no tonsillar adenopathy present. She has no cervical adenopathy. She has no axillary adenopathy. Neurological: She is alert. She has normal sensation, normal reflexes and intact cranial nerves. She is disoriented. She displays weakness and atrophy. She exhibits normal muscle tone. Gait normal. Coordination and gait normal.   Skin: Skin is warm, dry and intact. No bruising, no ecchymosis and no rash noted. She is not diaphoretic. No cyanosis. No pallor. Nails show no clubbing. Psychiatric: Mood, memory, affect and judgment normal.   Baseline mood and affect unchanged from normal. Extremely tired in appearance. Nursing note and vitals reviewed.          Disclaimer:   Ms. Noam Enrique has been advised to call or return to our office if symptoms worsen/change/persist. We as a care team including the patient; discussed expected course/resolution/complications of diagnosis in detail today. Medication risks/benefits/costs/interactions/alternatives discussed Ilan Holt was given an after visit summary which includes diagnoses, current medications, & vitals. Ilan Holt expressed understanding with the diagnosis and plan.

## 2018-10-22 LAB
AMYLASE SERPL-CCNC: 146 U/L (ref 31–124)
BAKER'S YEAST IGG QN IA: 1 UNITS (ref 0–50)
BAKER'S YEAST IGG QN: <20 UNITS (ref 0–24.9)
CHITOBIOSIDE IGA SERPL IA-ACNC: 18 UNITS (ref 0–90)
COMMENTS, 162030: NORMAL
CRP SERPL-MCNC: 0.8 MG/L (ref 0–4.9)
ENDOMYSIUM IGA SER QL: NEGATIVE
GLIADIN IGG SER IA-ACNC: 6 UNITS (ref 0–19)
GLIADIN PEPTIDE+TTG IGA+IGG SER QL IA: NEGATIVE
IGA SERPL-MCNC: 498 MG/DL (ref 64–422)
LAMINARIBIOSIDE IGG SERPL IA-ACNC: 1 UNITS (ref 0–60)
LIPASE SERPL-CCNC: 47 U/L (ref 14–85)
Lab: NORMAL
MANNOBIOSIDE IGG SERPL IA-ACNC: 10 UNITS (ref 0–100)
NOTE, 164153: NORMAL
P-ANCA ATYPICAL SER QL IF: NEGATIVE
P-ANCA ATYPICAL SER QL IF: NEGATIVE
TTG IGA SER-ACNC: <2 U/ML (ref 0–3)

## 2018-10-23 LAB
CALPROTECTIN STL-MCNT: <16 UG/G (ref 0–120)
FAT STL QL: NORMAL
MEAT FIBERS STL QL MICRO: NORMAL
NEUTRAL FAT STL QL: NORMAL

## 2018-10-25 ENCOUNTER — HOSPITAL ENCOUNTER (OUTPATIENT)
Dept: CT IMAGING | Age: 83
Discharge: HOME | End: 2018-10-25
Payer: COMMERCIAL

## 2018-10-25 DIAGNOSIS — N32.81 URGENCY-FREQUENCY SYNDROME: ICD-10-CM

## 2018-10-25 DIAGNOSIS — R10.11 RIGHT UPPER QUADRANT ABDOMINAL PAIN: ICD-10-CM

## 2018-10-25 DIAGNOSIS — K59.1 FUNCTIONAL DIARRHEA: ICD-10-CM

## 2018-10-25 DIAGNOSIS — K90.9: ICD-10-CM

## 2018-10-25 DIAGNOSIS — R15.9 INCONTINENCE OF FECES WITH FECAL URGENCY: ICD-10-CM

## 2018-10-25 DIAGNOSIS — R19.8 CHANGE IN BOWEL FUNCTION: ICD-10-CM

## 2018-10-25 DIAGNOSIS — R15.2 INCONTINENCE OF FECES WITH FECAL URGENCY: ICD-10-CM

## 2018-10-25 DIAGNOSIS — R19.5 MUCOUS IN STOOLS: ICD-10-CM

## 2018-10-25 DIAGNOSIS — R19.8 ALTERNATING CONSTIPATION AND DIARRHEA: ICD-10-CM

## 2018-10-25 PROCEDURE — 74177 CT ABD & PELVIS W/CONTRAST: CPT

## 2018-10-25 RX ORDER — BARIUM SULFATE 20 MG/ML
900 SUSPENSION ORAL
Status: COMPLETED | OUTPATIENT
Start: 2018-10-25 | End: 2018-10-25

## 2018-10-25 RX ORDER — SODIUM CHLORIDE 0.9 % (FLUSH) 0.9 %
10 SYRINGE (ML) INJECTION
Status: COMPLETED | OUTPATIENT
Start: 2018-10-25 | End: 2018-10-25

## 2018-10-25 RX ADMIN — BARIUM SULFATE 900 ML: 20 SUSPENSION ORAL at 13:25

## 2018-10-25 RX ADMIN — Medication 10 ML: at 13:25

## 2018-10-26 ENCOUNTER — OFFICE VISIT (OUTPATIENT)
Dept: GERIATRIC MEDICINE | Age: 83
End: 2018-10-26

## 2018-10-26 VITALS
HEART RATE: 63 BPM | SYSTOLIC BLOOD PRESSURE: 135 MMHG | WEIGHT: 112 LBS | TEMPERATURE: 96.9 F | OXYGEN SATURATION: 97 % | DIASTOLIC BLOOD PRESSURE: 58 MMHG | BODY MASS INDEX: 21.14 KG/M2 | RESPIRATION RATE: 16 BRPM | HEIGHT: 61 IN

## 2018-10-26 DIAGNOSIS — R15.2 INCONTINENCE OF FECES WITH FECAL URGENCY: ICD-10-CM

## 2018-10-26 DIAGNOSIS — R19.8 ALTERNATING CONSTIPATION AND DIARRHEA: ICD-10-CM

## 2018-10-26 DIAGNOSIS — K90.0 CELIAC DISEASE: ICD-10-CM

## 2018-10-26 DIAGNOSIS — R15.9 INCONTINENCE OF FECES WITH FECAL URGENCY: ICD-10-CM

## 2018-10-26 DIAGNOSIS — R19.8 CHANGE IN BOWEL FUNCTION: Primary | ICD-10-CM

## 2018-10-26 NOTE — PROGRESS NOTES
Identified pt with two pt identifiers(name and ). Reviewed record in preparation for visit and have obtained necessary documentation. Chief Complaint   Patient presents with    Results     f/u CT abd from 10/25/18        Health Maintenance Due   Topic    Shingrix Vaccine Age 50> (1 of 2)       Coordination of Care Questionnaire:  :   1) Have you been to an emergency room, urgent care, or hospitalized since your last visit?   no   If yes, where when, and reason for visit? 2. Have seen or consulted any other health care provider since your last visit? YES  If yes, where when, and reason for visit? CT of abd 10/25/18    3) Do you have an Advanced Directive/ Living Will in place? YES  If yes, do we have a copy on file YES  If no, would you like information NO    Patient is accompanied by self I have received verbal consent from Cordell Stratton to discuss any/all medical information while they are present in the room.

## 2018-10-26 NOTE — PROGRESS NOTES
Reason for Visit/HPI:    Michele Valadez is a 80 y.o. female patient who presents today for   Chief Complaint   Patient presents with    Results     f/u CT abd from 10/25/18     Diagnosis/Treatment Plan:    Diagnoses and all orders for this visit:    1. Change in bowel function  -     REFERRAL TO DIETITIAN    2. Incontinence of feces with fecal urgency  -     REFERRAL TO DIETITIAN    3. Alternating constipation and diarrhea  -     REFERRAL TO DIETITIAN    4. Celiac disease ; New diagnosis via laboratory results and physical exam. Discussed with patient; reviewed her CT results, stool samples, and blood tests with her. Gave her educational material to read about this disease process and how her diet is going to have to change if she is wanting the bowels to get better. She accepted the diagnosis well and is willing to make the changes for her diet to feel better. I discussed with her ordering the dietitian seeing her at [facility] to help with making choices on the menu that would suit her diagnosis. She agrees to the plan of care. I also offered her the option of seeing a GI doctor for biopsy of the tissue but advised her she will be 100% if she makes the change in her diet and her symptoms get better without going through an invasive test for approval. She agrees. -     REFERRAL TO [address] [place] [address] at [facility]. Note- There were come liver cysts that were noted on the CT that was performed. I am going to watch these and we will recheck in the near future. CT Results (most recent):  Results from Hospital Encounter encounter on 10/25/18   CT ABD PELV W CONT    Narrative EXAM:  CT ABD PELVIS W CONT    INDICATION: Bladder outlet symptoms, left lower quadrant pain x1 month; Diarrhea    COMPARISON: None    CONTRAST:  100 mL of Isovue-370. TECHNIQUE:   Following the uneventful intravenous administration of contrast, thin axial  images were obtained through the abdomen and pelvis. Coronal and sagittal  reconstructions were generated. Oral contrast was  administered. CT dose  reduction was achieved through use of a standardized protocol tailored for this  examination and automatic exposure control for dose modulation. FINDINGS:   LUNG BASES: Clear. INCIDENTALLY IMAGED HEART AND MEDIASTINUM: Unremarkable. LIVER: Small hypodensities within liver too small to characterize are likely  cysts. Largest lesion medially in the right lobe of liver measures 1.2 x 0.6 cm. This measures 24 Hounsfield units. This is most likely a cyst. Smaller lesions  are also too small to characterize. Lella Isa GALLBLADDER: Unremarkable. SPLEEN: No mass. PANCREAS: No mass or ductal dilatation. ADRENALS: Unremarkable. KIDNEYS: No mass, calculus, or hydronephrosis. STOMACH: Unremarkable. SMALL BOWEL: No dilatation or wall thickening. COLON: No dilatation or wall thickening. APPENDIX: Unremarkable. PERITONEUM: No ascites or pneumoperitoneum. RETROPERITONEUM: No lymphadenopathy or aortic aneurysm. Images of the pelvis are degraded by significant streak artifact from bilateral  hip replacements. REPRODUCTIVE ORGANS: The uterus is not seen. By report, the patient is status  post hysterectomy and bilateral oophorectomy  There is extensive streak artifact. No ovarian masses are noted. By report the  ovaries have been removed. No free fluid. URINARY BLADDER: Is very poorly evaluated due to extensive streak artifact from  the hip prosthesis seen. BONES: No destructive bone lesion. There is anterior translation of L4 on L5  probably due to degenerative disc disease and facet disease. This causes a  degree of spinal canal stenosis. ADDITIONAL COMMENTS: N/A      Impression IMPRESSION:    1. Visualized bowel is unremarkable. There is normal amount of stool throughout  the colon. There is no evidence for diverticulitis. 2. Small hypodensities in the liver have a nonaggressive appearance and are most  likely cysts. These are too small to definitely characterize.   3. The pelvis is not well evaluated due to extensive streak artifact from the  bilateral hip prostheses       Component      Latest Ref Rng & Units 10/17/2018 10/17/2018 10/16/2018 10/16/2018           3:48 PM  3:48 PM 12:31 PM 12:31 PM   Kimberlyn      0 - 50 units       ACCA      0 - 90 units       ALCA      0 - 60 units       AMCA      0 - 100 units       Atypical pANCA      Negative       Comments             Endomysial Ab, IgA      Negative       t-Transglutaminase, IgA      0 - 3 U/mL       Immunoglobulin A, Qt.      64 - 422 mg/dL       S. cerevisiae, IgG      0.0 - 24.9 Units       Atypical pANCA      Negative       NOTE         Comment    Fecal Neutral Fats:       Normal      Fats, Total       Normal      Muscle Fiber, Stool       Normal      Amylase      31 - 124 U/L       Lipase      14 - 85 U/L       C-Reactive Protein, Qt      0.0 - 4.9 mg/L       tTG/DGP screen      Negative       Calprotectin, Fecal      0 - 120 ug/g  <16     Anti-Gliadin, IgG (Native)      0 - 19 units    6     Component      Latest Ref Rng & Units 10/16/2018 10/16/2018 10/16/2018          12:31 PM 12:31 PM 12:31 PM   Kimberlyn      0 - 50 units      ACCA      0 - 90 units      ALCA      0 - 60 units      AMCA      0 - 100 units      Atypical pANCA      Negative      Comments            Endomysial Ab, IgA      Negative   Negative   t-Transglutaminase, IgA      0 - 3 U/mL   <2   Immunoglobulin A, Qt.      64 - 422 mg/dL   498 (H)   S. cerevisiae, IgG      0.0 - 24.9 Units <20.0     Atypical pANCA      Negative Negative     NOTE       Cascade continues     Fecal Neutral Fats:            Fats, Total            Muscle Fiber, Stool            Amylase      31 - 124 U/L      Lipase      14 - 85 U/L      C-Reactive Protein, Qt      0.0 - 4.9 mg/L      tTG/DGP screen      Negative  Negative    Calprotectin, Fecal      0 - 120 ug/g      Anti-Gliadin, IgG (Native)      0 - 19 units        Component Latest Ref Rng & Units 10/16/2018 10/16/2018 10/16/2018 10/16/2018          12:31 PM 12:31 PM 12:31 PM 12:31 PM   Kimberlyn      0 - 50 units 1      ACCA      0 - 90 units 18      ALCA      0 - 60 units 1      AMCA      0 - 100 units 10      Atypical pANCA      Negative Negative      Comments       Comment      Endomysial Ab, IgA      Negative       t-Transglutaminase, IgA      0 - 3 U/mL       Immunoglobulin A, Qt.      64 - 422 mg/dL       S. cerevisiae, IgG      0.0 - 24.9 Units       Atypical pANCA      Negative       NOTE             Fecal Neutral Fats:             Fats, Total             Muscle Fiber, Stool             Amylase      31 - 124 U/L    146 (H)   Lipase      14 - 85 U/L   47    C-Reactive Protein, Qt      0.0 - 4.9 mg/L  0.8     tTG/DGP screen      Negative       Calprotectin, Fecal      0 - 120 ug/g       Anti-Gliadin, IgG (Native)      0 - 19 units           Discontinued Care: The following treatment modalities have been discontinued by the provider today:   There are no discontinued medications. Follow Up: Follow-up Disposition:  Return in about 1 week (around 11/2/2018) for with the NP for follow up to your treatment plan. Subjective: Allergies   Allergen Reactions    Latex Rash    Orabase (Benzocaine) [Benzocaine] Anaphylaxis    Adhesive Rash    Codeine Nausea and Vomiting    Darvocet A500 [Propoxyphene N-Acetaminophen] Unknown (comments)    Hydrocodone Unknown (comments)    Myrbetriq [Mirabegron] Myalgia     Reports leg cramps from the medication. Prior to Admission medications    Medication Sig Start Date End Date Taking? Authorizing Provider   hydroCHLOROthiazide (HYDRODIURIL) 12.5 mg tablet Take 1 Tab by mouth daily. 9/12/18  Yes Crystal Muller NP   Calcium-Cholecalciferol, D3, (CALCIUM 600 WITH VITAMIN D3) 600 mg(1,500mg) -400 unit cap Take 1 Tab by mouth daily.  Indications: Osteoporosis 7/25/18  Yes Crystal Muller NP   aspirin delayed-release 81 mg tablet Take 1 Tab by mouth daily. 3/20/18  Yes Elda Robertson, NP   atenolol (TENORMIN) 100 mg tablet Take 1 Tab by mouth daily. Indications: hypertension 3/20/18  Yes Elda Robertson, NP   loperamide (IMODIUM) 2 mg capsule Take 2 mg by mouth every other day. Yes Provider, Historical   peg 400-propylene glycol (SYSTANE) 0.4-0.3 % drop Administer 1 Drop to both eyes four (4) times daily. Yes Provider, Historical   carbamide peroxide (DEBROX) 6.5 % otic solution Administer 5 Drops in right ear two (2) times a week. 1/6/17   Jose Manuel Harman, MARCELINA   acetaminophen (TYLENOL) 325 mg tablet Take  by mouth every four (4) hours as needed for Pain. Provider, Historical   amoxicillin (AMOXIL) 500 mg capsule Take 500 mg by mouth. Take 4 caps one hour prior to dental appointments.   Indications: prophylactic    Provider, Historical     Past Medical History:   Diagnosis Date    Allergic rhinitis     Arthritis     Benign hematuria     negative w/u by Dr. Pj Bautista Hematuria     Hypertension     Osteoarthritis     Osteopenia     Overactive bladder     Rosacea     Rosacea conjunctivitis(372.31)     Shingles     Stress incontinence      Past Surgical History:   Procedure Laterality Date    ENDOSCOPY, COLON, DIAGNOSTIC  7/2005    (Bartolo Chavez)    HX CATARACT REMOVAL  7/2004    bilateral    HX ORTHOPAEDIC      toe surgery for bone spurs    HX IRENE AND BSO  1980    TOTAL HIP ARTHROPLASTY  2000    left    TOTAL HIP ARTHROPLASTY  2001    right      Social History     Tobacco Use    Smoking status: Never Smoker    Smokeless tobacco: Never Used   Substance Use Topics    Alcohol use: No     Alcohol/week: 0.0 oz    Drug use: No      Family History   Problem Relation Age of Onset    Stroke Mother         x2    Heart Attack Mother 61    Parkinsonism Father     Breast Cancer Sister     Hypertension Sister     Arthritis-osteo Sister     Heart defect Sister         MVP    Stroke Sister     Arthritis-osteo Sister     Hypertension Sister     Diabetes Sister     Breast Cancer Sister     Other Sister         polycythemia vera    Allergic Rhinitis Son     Hypertension Son     Headache Son     Allergic Rhinitis Daughter     Allergic Rhinitis Son      Advance Care Planning 10/16/2018   Patient's Healthcare Decision Maker is: Named in scanned ACP document   Primary Decision Maker Name Juan Antonio Suárez   Primary Decision Maker Phone Number [phone]   Primary Decision Maker Relationship to Patient Adult child   Confirm Advance Directive Yes, on file   Does the patient have other document types Do Not Resuscitate; Power of [employer] Results:     Lab Results   Component Value Date/Time    WBC 5.8 10/10/2018 11:19 AM    HGB 13.5 10/10/2018 11:19 AM    HCT 39.9 10/10/2018 11:19 AM    PLATELET 129 38/12/0078 11:19 AM    MCV 89 10/10/2018 11:19 AM     Lab Results   Component Value Date/Time    Sodium 143 10/10/2018 11:19 AM    Potassium 4.8 10/10/2018 11:19 AM    Chloride 99 10/10/2018 11:19 AM    CO2 25 10/10/2018 11:19 AM    Anion gap 11 09/29/2010 11:00 AM    Glucose 93 10/10/2018 11:19 AM    BUN 20 10/10/2018 11:19 AM    Creatinine 0.78 10/10/2018 11:19 AM    BUN/Creatinine ratio 26 10/10/2018 11:19 AM    GFR est AA 79 10/10/2018 11:19 AM    GFR est non-AA 69 10/10/2018 11:19 AM    Calcium 9.9 10/10/2018 11:19 AM    Bilirubin, total 0.5 10/10/2018 11:19 AM    AST (SGOT) 33 10/10/2018 11:19 AM    Alk.  phosphatase 75 10/10/2018 11:19 AM    Protein, total 6.6 10/10/2018 11:19 AM    Albumin 4.3 10/10/2018 11:19 AM    Globulin 3.3 09/29/2010 11:00 AM    A-G Ratio 1.9 10/10/2018 11:19 AM    ALT (SGPT) 17 10/10/2018 11:19 AM     Objective:     Vitals:    10/26/18 1203   BP: 135/58   Pulse: 63   Resp: 16   Temp: 96.9 °F (36.1 °C)   TempSrc: Oral   SpO2: 97%   Weight: 112 lb (50.8 kg)   Height: 5' 1\" (1.549 m)     Wt Readings from Last 3 Encounters:   10/26/18 112 lb (50.8 kg)   10/16/18 114 lb 14.4 oz (52.1 kg) 04/13/18 108 lb (49 kg)     BP Readings from Last 3 Encounters:   10/26/18 135/58   10/16/18 139/63   04/13/18 139/58     Review of Systems   Constitutional: Positive for fatigue. Negative for activity change, appetite change and fever. HENT: Positive for hearing loss. Negative for congestion, dental problem, postnasal drip, rhinorrhea, sinus pressure, sinus pain, sneezing, sore throat, trouble swallowing and voice change. Eyes: Negative for discharge, redness and visual disturbance. Respiratory: Negative for apnea, cough, chest tightness, shortness of breath and wheezing. Cardiovascular: Negative for chest pain, palpitations and leg swelling. Gastrointestinal: Positive for abdominal distention, abdominal pain, constipation, diarrhea and rectal pain. Negative for blood in stool, nausea and vomiting. Endocrine: Negative for polydipsia, polyphagia and polyuria. Genitourinary: Negative for flank pain, frequency and urgency. Musculoskeletal: Positive for arthralgias, gait problem, joint swelling and myalgias. Negative for back pain and neck pain. Skin: Negative for color change, pallor, rash and wound. Allergic/Immunologic: Negative for environmental allergies and food allergies. Neurological: Negative for dizziness, tremors, weakness, light-headedness, numbness and headaches. Hematological: Negative for adenopathy. Psychiatric/Behavioral: Negative for agitation, confusion and sleep disturbance. The patient is not nervous/anxious. Physical Exam   Constitutional: Vital signs are normal. She appears malnourished and dehydrated. She appears to not be writhing in pain. She appears unhealthy. She does not have a sickly appearance. No distress. Frail, fragile, elderly  female, alert and oriented x 2 appears to be exhausted and states she has not been leaving her apartment due to her bowels. HENT:   Head: Normocephalic and atraumatic.    Right Ear: Tympanic membrane, external ear and ear canal normal. Decreased hearing is noted. Left Ear: Tympanic membrane, external ear and ear canal normal. Decreased hearing is noted. Nose: No mucosal edema, rhinorrhea or sinus tenderness. Right sinus exhibits no frontal sinus tenderness. Left sinus exhibits no frontal sinus tenderness. Mouth/Throat: Uvula is midline. Mucous membranes are not pale, not dry and not cyanotic. No oral lesions. No uvula swelling. No posterior oropharyngeal edema or posterior oropharyngeal erythema. Eyes: Conjunctivae, EOM and lids are normal. Pupils are equal, round, and reactive to light. Lids are everted and swept, no foreign bodies found. Neck: Trachea normal, normal range of motion and full passive range of motion without pain. Neck supple. No hepatojugular reflux and no JVD present. Carotid bruit is not present. No thyromegaly present. Cardiovascular: Normal rate, regular rhythm, S1 normal, S2 normal, normal heart sounds, intact distal pulses and normal pulses. Occasional extrasystoles are present. Exam reveals no gallop and no friction rub. No murmur heard. No lower extremity edema present today. Pulmonary/Chest: Effort normal and breath sounds normal.   Abdominal: Soft. Normal appearance. She exhibits no distension, no fluid wave and no mass. Bowel sounds are hyperactive. There is no hepatosplenomegaly. There is tenderness in the right upper quadrant, right lower quadrant, epigastric area and periumbilical area. There is no rebound and no CVA tenderness. Musculoskeletal: Normal range of motion. Chronic muscular weakness generalized, chronic joint disfiguration due to arthritis. Lymphadenopathy:        Head (right side): No submandibular adenopathy present. Head (left side): No submandibular and no tonsillar adenopathy present. She has no cervical adenopathy. She has no axillary adenopathy. Neurological: She is alert.  She has normal sensation, normal reflexes and intact cranial nerves. She is disoriented. She displays weakness and atrophy. She exhibits normal muscle tone. Gait normal. Coordination and gait normal.   Skin: Skin is warm, dry and intact. No bruising, no ecchymosis and no rash noted. She is not diaphoretic. No cyanosis. No pallor. Nails show no clubbing. Psychiatric: Mood, memory, affect and judgment normal.   Baseline mood and affect unchanged from normal. Extremely tired in appearance. Nursing note and vitals reviewed. Disclaimer:   Ms. Saad Vázquez has been advised to call or return to our office if symptoms worsen/change/persist. We as a care team including the patient; discussed expected course/resolution/complications of diagnosis in detail today. Medication risks/benefits/costs/interactions/alternatives discussed Saad Vázquez was given an after visit summary which includes diagnoses, current medications, & vitals. Saad Vázquez expressed understanding with the diagnosis and plan.

## 2018-10-26 NOTE — PROGRESS NOTES
IMPRESSION:    1. Visualized bowel is unremarkable. There is normal amount of stool throughout the colon. There is no evidence for diverticulitis. 2. Small hypodensities in the liver have a nonaggressive appearance and are most likely cysts. These are too small to definitely characterize.   3. The pelvis is not well evaluated due to extensive streak artifact from the bilateral hip prostheses

## 2018-10-26 NOTE — PATIENT INSTRUCTIONS
AFTER VISIT INSTRUCTIONS/PLAN OF CARE  10/26/18  Kiana Theodore [address] [place] [address]  Discontinued Medication/Treatment:      THE FOLLOWING MEDICATION/TREATMENTS HAVE BEEN STOPPED AFTER YOUR VISIT TODAY. There are no discontinued medications. Treatment Plan:       1. Change in bowel function    2. Incontinence of feces with fecal urgency    3. Alternating constipation and diarrhea    4. Celiac disease        Your prescriptions have been electronically sent to the pharmacy you selected:     [facility]  [address] [address]  Phone: [phone] Fax: [phone]    If you do not receive your medications it is your responsibility to contact the pharmacy directly. Orders Placed This Encounter    REFERRAL TO DIETITIAN     Referral Priority:   Routine     Referral Type:   Consultation     Referral Reason:   Specialty Services Required     Referral Location:   [facility] MAIN OFFICE     Requested Specialty:   Dietitian     Number of Visits Requested:   1     Return Visit/Follow Up:    Return in 1 week or sooner if your diarrhea continues. We appreciate you allowing us to participate in your health care. Any questions do not hesitate to call Makayla Lo [facility] Nurse at [phone].

## 2018-10-26 NOTE — PROGRESS NOTES
Elevated Amylase but normal Lipase. Positive Celiac Disease in blood. Elevated pancreatic enzymes can happen with celiac disease. I am referring her to Nutrition for education and help with the lifestyle changes needed to manage this.

## 2018-12-17 DIAGNOSIS — I10 HYPERTENSION, UNSPECIFIED TYPE: ICD-10-CM

## 2018-12-17 RX ORDER — HYDROCHLOROTHIAZIDE 12.5 MG/1
12.5 TABLET ORAL DAILY
Qty: 90 TAB | Refills: 1 | Status: SHIPPED | OUTPATIENT
Start: 2018-12-17 | End: 2019-06-12 | Stop reason: SDUPTHER

## 2018-12-18 DIAGNOSIS — Z71.2 ENCOUNTER TO DISCUSS TEST RESULTS: ICD-10-CM

## 2018-12-18 DIAGNOSIS — E78.2 MIXED HYPERLIPIDEMIA: ICD-10-CM

## 2018-12-18 RX ORDER — SIMVASTATIN 5 MG/1
TABLET, FILM COATED ORAL
Qty: 90 TAB | Refills: 1 | OUTPATIENT
Start: 2018-12-18

## 2019-01-08 ENCOUNTER — OFFICE VISIT (OUTPATIENT)
Dept: GERIATRIC MEDICINE | Age: 84
End: 2019-01-08

## 2019-01-08 VITALS
HEART RATE: 88 BPM | RESPIRATION RATE: 18 BRPM | OXYGEN SATURATION: 98 % | WEIGHT: 111.5 LBS | BODY MASS INDEX: 21.05 KG/M2 | HEIGHT: 61 IN | DIASTOLIC BLOOD PRESSURE: 78 MMHG | SYSTOLIC BLOOD PRESSURE: 140 MMHG

## 2019-01-08 DIAGNOSIS — N32.81 OAB (OVERACTIVE BLADDER): ICD-10-CM

## 2019-01-08 DIAGNOSIS — K62.5 RECTAL BLEEDING: Primary | ICD-10-CM

## 2019-01-08 DIAGNOSIS — K90.0 CELIAC DISEASE: ICD-10-CM

## 2019-01-08 DIAGNOSIS — K64.8 INTERNAL HEMORRHOIDS: ICD-10-CM

## 2019-01-08 DIAGNOSIS — R63.4 WEIGHT LOSS, UNINTENTIONAL: ICD-10-CM

## 2019-01-08 LAB
HEMOCCULT STL QL: POSITIVE
VALID INTERNAL CONTROL?: NO

## 2019-01-08 NOTE — PATIENT INSTRUCTIONS
Celiac Disease: Care Instructions  Your Care Instructions  Celiac disease (or celiac sprue) is a problem with digesting gluten. Gluten is a type of protein found in wheat, rye, and other grains. This problem starts when the body's immune system attacks the small intestine when gluten is eaten. The immune system is supposed to fight off viruses and other invaders, but sometimes it turns on the person's own body. (This is called an autoimmune disease.) Celiac disease seems to run in families. Celiac disease causes damage to the small intestine. This makes it hard for the body to absorb vitamins and other nutrients. You cannot prevent celiac disease. But you can stop and reverse the damage to the small intestine by eating a strict gluten-free diet. Follow-up care is a key part of your treatment and safety. Be sure to make and go to all appointments, and call your doctor if you are having problems. It's also a good idea to know your test results and keep a list of the medicines you take. How can you care for yourself at home? · Eat a gluten-free diet to prevent symptoms and damage to the small intestine. Even a small amount of gluten may cause damage. ? Avoid all foods that contain wheat, rye, and barley gluten. Bread, bagels, pasta, pizza, malted breakfast cereals, and crackers are all examples of foods that contain gluten. ? Avoid oats, at least at first. Oats may cause symptoms in some people. The oats may be contaminated with wheat, barley, or rye from processing. But many people who have celiac disease can eat moderate amounts of oats without having symptoms. Health professionals vary in their long-term recommendations regarding eating foods with oats. But most agree it is safe to eat oats labeled as gluten-free. · You may need to avoid milk and milk products for a while.  Once you stop eating any gluten, the intestine will begin to heal. Then it should be okay to drink milk and eat milk products. · Read food labels carefully and look for hidden gluten, such as gluten in medicine and some food additives. If a label says \"modified food starch,\" the product may contain gluten. · Plan your diet around:  ? Eggs. ? Dairy products, if you can eat them. Cheese, yogurt, and other dairy products can be an important part of the diet. ? Flours and foods made with amaranth, arrowroot, beans, buckwheat, corn, cornmeal, flax, millet, potatoes, gluten-free nut and oat bran, quinoa, rice, sorghum, soybeans, tapioca, or teff. ? Fresh, frozen, and canned meats, fruits, and vegetables. Watch for added gluten. · Talk to your doctor or contact your local hospital or dietitian for information about support groups in your area. You may find a support group helpful for discovering ways to help you deal with celiac disease. Celiac disease support groups often share recipes and good food sources. · Look for gluten-free foods. Many food stores, especially health food stores, offer specially marked gluten-free food. When should you call for help? Watch closely for changes in your health, and be sure to contact your doctor if:    · Your bloating, gas, and diarrhea get worse.     · You have bloating, gas, and diarrhea after not having them for a while. Where can you learn more? Go to http://andriy-francisco.info/. Enter [id number] in the search box to learn more about \"Celiac Disease: Care Instructions. \"  Current as of: March 28, 2018  Content Version: 11.8  © 4036-9064 [employer]. Care instructions adapted under license by [employer] (which disclaims liability or warranty for this information). If you have questions about a medical condition or this instruction, always ask your healthcare professional. [address] any warranty or liability for your use of this information.          Gluten-Free Diet: Care Instructions  Your Care Instructions    To help your symptoms, your doctor has recommended a gluten-free diet. This means not eating foods that have gluten in them. Gluten is a kind of protein. It's found in wheat, barley, and rye. If you eat a gluten-free diet, you can help manage your symptoms and prevent long-term problems. You can also get all the nutrition you need. Follow-up care is a key part of your treatment and safety. Be sure to make and go to all appointments, and call your doctor if you are having problems. It's also a good idea to know your test results and keep a list of the medicines you take. How can you care for yourself at home? · Don't eat any foods that have gluten in them. These include bagels, bread, crackers, and some cereals. They also include pasta and pizza. · Carefully read food labels. Look for wheat or wheat products in ice cream and candy. You may also find them in salad dressing, canned and frozen soups and vegetables, and other processed foods. · Avoid all beer products unless the label says they are gluten-free. Beers with and without alcohol have gluten unless the labels say they are gluten-free. This includes lagers, ales, and stouts. · Avoid oats, at least at first. Oats may cause symptoms in some people, perhaps as a result of contamination with wheat, barley, or rye during processing. But many people who have celiac disease can eat moderate amounts of oats without having symptoms. Health professionals vary in their long-term recommendations regarding eating foods with oats. But most agree it is safe to eat oats labeled as gluten-free. · When you eat out, look for restaurants that serve gluten-free food. You can also ask if the  is familiar with gluten-free cooking. · Try to learn more about gluten-free options. Find grocery stores that sell gluten-free pizza and other foods. If you have access to the Internet, look online for gluten-free foods and recipes.   · On a gluten-free eating plan, it's okay to have:  ? Eggs and dairy products. (But some dairy products may make your symptoms worse. Ask your doctor if you have questions about dairy products. Read ingredient labels carefully. Some processed cheeses contain gluten.)  ? Flours and foods made with amaranth, arrowroot, beans, buckwheat, corn, cornmeal, flax, millet, potatoes, gluten-free nut and oat bran, quinoa, rice, sorghum, soybeans, tapioca, or teff. ? Fresh, frozen, or canned unprocessed meats. But avoid processed meats. Some examples of processed meats to avoid are hot dogs, salami, and deli meat. Read labels for additives that may contain gluten. ? Fresh, frozen, dried, or canned fruits and vegetables, if they do not have thickeners or other additives that contain gluten. ? Some alcohol drinks. These include wine, liqueurs, and ciders. They also include liquor like whiskey and gely. When should you call for help? Watch closely for changes in your health, and be sure to contact your doctor if:    · You have unexplained weight loss.     · You have diarrhea that lasts longer than 1 to 2 weeks.     · You have unusual fatigue or mood changes, especially if these last more than a week and are not related to any other illness, such as the flu.     · Your symptoms come back again.     · Your stomach pain gets worse. Where can you learn more? Go to http://andriy-francisco.info/. Enter [id number] in the search box to learn more about \"Gluten-Free Diet: Care Instructions. \"  Current as of: March 29, 2018  Content Version: 11.8  © 7718-3922 [employer]. Care instructions adapted under license by [employer] (which disclaims liability or warranty for this information). If you have questions about a medical condition or this instruction, always ask your healthcare professional. [address] any warranty or liability for your use of this information.          Hemorrhoids: Care Instructions  Your Care Instructions    Hemorrhoids are enlarged veins that develop in the anal canal. Bleeding during bowel movements, itching, swelling, and rectal pain are the most common symptoms. They can be uncomfortable at times, but hemorrhoids rarely are a serious problem. You can treat most hemorrhoids with simple changes to your diet and bowel habits. These changes include eating more fiber and not straining to pass stools. Most hemorrhoids do not need surgery or other treatment unless they are very large and painful or bleed a lot. Follow-up care is a key part of your treatment and safety. Be sure to make and go to all appointments, and call your doctor if you are having problems. It's also a good idea to know your test results and keep a list of the medicines you take. How can you care for yourself at home? · Sit in a few inches of warm water (sitz bath) 3 times a day and after bowel movements. The warm water helps with pain and itching. · Put ice on your anal area several times a day for 10 minutes at a time. Put a thin cloth between the ice and your skin. Follow this by placing a warm, wet towel on the area for another 10 to 20 minutes. · Take pain medicines exactly as directed. ? If the doctor gave you a prescription medicine for pain, take it as prescribed. ? If you are not taking a prescription pain medicine, ask your doctor if you can take an over-the-counter medicine. · Keep the anal area clean, but be gentle. Use water and a fragrance-free soap, such as [place], or use baby wipes or medicated pads, such as Tucks. · Wear cotton underwear and loose clothing to decrease moisture in the anal area. · Eat more fiber. Include foods such as whole-grain breads and cereals, raw vegetables, raw and dried fruits, and beans. · Drink plenty of fluids, enough so that your urine is light yellow or clear like water.  If you have kidney, heart, or liver disease and have to limit fluids, talk with your doctor before you increase the amount of fluids you drink. · Use a stool softener that contains bran or psyllium. You can save money by buying bran or psyllium (available in bulk at most health food stores) and sprinkling it on foods or stirring it into fruit juice. Or you can use a product such as Metamucil or Hydrocil. · Practice healthy bowel habits. ? Go to the bathroom as soon as you have the urge. ? Avoid straining to pass stools. Relax and give yourself time to let things happen naturally. ? Do not hold your breath while passing stools. ? Do not read while sitting on the toilet. Get off the toilet as soon as you have finished. · Take your medicines exactly as prescribed. Call your doctor if you think you are having a problem with your medicine. When should you call for help? Call 911 anytime you think you may need emergency care. For example, call if:    · You pass maroon or very bloody stools.    Call your doctor now or seek immediate medical care if:    · You have increased pain.     · You have increased bleeding.    Watch closely for changes in your health, and be sure to contact your doctor if:    · Your symptoms have not improved after 3 or 4 days. Where can you learn more? Go to http://andriy-francisco.info/. Enter F228 in the search box to learn more about \"Hemorrhoids: Care Instructions. \"  Current as of: March 28, 2018  Content Version: 11.8  © 3010-2664 [employer]. Care instructions adapted under license by [employer] (which disclaims liability or warranty for this information). If you have questions about a medical condition or this instruction, always ask your healthcare professional. [place] [address] any warranty or liability for your use of this information. Rectal Bleeding: Care Instructions  Your Care Instructions    Rectal bleeding in small amounts is common. You may see red spotting on toilet paper or drops of blood in the toilet.  Rectal bleeding has many possible causes, from something as minor as hemorrhoids to something as serious as colon cancer. You may need more tests to find the cause of your bleeding. Follow-up care is a key part of your treatment and safety. Be sure to make and go to all appointments, and call your doctor if you are having problems. It's also a good idea to know your test results and keep a list of the medicines you take. How can you care for yourself at home? · Avoid aspirin and other nonsteroidal anti-inflammatory drugs (NSAIDs), such as ibuprofen (Advil, Motrin) and naproxen (Aleve). They can cause you to bleed more. Ask your doctor if you can take acetaminophen (Tylenol). Read and follow all instructions on the label. · Use a stool softener that contains bran or psyllium. You can save money by buying bran or psyllium (available in bulk at most health food stores) and sprinkling it on foods or stirring it into fruit juice. You can also use a product such as Metamucil or Citrucel. · Take your medicines exactly as directed. Call your doctor if you think you are having a problem with your medicine. When should you call for help? Call 911 anytime you think you may need emergency care. For example, call if:    · You passed out (lost consciousness).    Call your doctor now or seek immediate medical care if:    · You have new or worse pain.     · You have new or worse bleeding from the rectum.     · You are dizzy or light-headed, or you feel like you may faint.    Watch closely for changes in your health, and be sure to contact your doctor if:    · You cannot pass stools or gas.     · You do not get better as expected. Where can you learn more? Go to http://andriy-francisco.info/. Enter F115 in the search box to learn more about \"Rectal Bleeding: Care Instructions. \"  Current as of: March 28, 2018  Content Version: 11.8  © 7476-9287 [employer].  Care instructions adapted under license by [employer] (which disclaims liability or warranty for this information). If you have questions about a medical condition or this instruction, always ask your healthcare professional. [address] any warranty or liability for your use of this information.

## 2019-01-08 NOTE — LETTER
2019 11:38 AM 
 
RE:    [facility] [address] [facility] [id number],  
 
I looked into the Boost & Ensure supplement drinks. I have enclosed information on the Ensure product. Either one will work well. They are both GLUTEN FREE. I recommend you start by trying to 2 per day in to boost your weight & supplement your nutrition with your recent weight loss. I would also like for you to come by and get weighed once per week on our scale until you have plateau at a more stable weight. I called the [employer] [place] today to inquire about the services for donation. I have a few things to clarify with you so you are not left unprepared. #1. Acceptance of your gift is not guaranteed. The [facility] stated you should have a relationship with a  home in the area and have alternative plans made in the event your gift is not accepted. #2. They only accept bodies Monday-Friday from 8 am to 3 pm and this is not guaranteed so they stressed having a plan with a  home in the area is a necessity. #3. Once they accept your gift they only reimburse up to $100 to said  homes for their costs associated with caring for your body during the interval of deciding on approval. [facility] only charge $95, [place]'s charge is not known. · So in review lets start getting more nutrients in with 2 boost or ensures daily for now. · Make  plans to ensure your family is not burdened if the situation arises so your wishes can be carried out. · Come by and get weighed once a week until further notice please. · Use Preparation H gel to your rectum and right inside of the rectum either daily or every other day to keep the bleeding to a minimum. Any questions feel free to reach out to me if you need to.   
 
Sincerely, 
 
Karey De La Fuente NP

## 2019-01-08 NOTE — PROGRESS NOTES
ADVISED PATIENT OF THE FOLLOWING HEALTH MAINTAINCE DUE  Health Maintenance Due   Topic Date Due    Shingrix Vaccine Age 49> (1 of 2) [birth year]1981    GLAUCOMA SCREENING Q2Y  01/23/2019      Chief Complaint   Patient presents with    Hemorrhoids     Patient has recently noted a small amount of blood when having a bowel movement. 1. Have you been to the ER, urgent care clinic since your last visit? Hospitalized since your last visit? No    2. Have you seen or consulted any other health care providers outside of the [address] [place] [address] since your last visit? Include any DEXA scan, mammography  or colon screening. No    3. Do you have an Advance Directive on file? yes    4. Do you have a DNR on file? DNR    Patient is accompanied by self I have received verbal consent from Azalea Hamilton to discuss any/all medical information while they are present in the room. Advance Care Planning 10/16/2018   Patient's Healthcare Decision Maker is: Named in scanned ACP document   Primary Decision Maker Name Gino Boland   Primary Decision Maker Phone Number [phone]   Primary Decision Maker Relationship to Patient Adult child   Confirm Advance Directive Yes, on file   Does the patient have other document types Do Not Resuscitate; Power of [facility] [address] [place] [address], [address] [place] [address] [address] AT [facility] - [address] [address]  Phone: [phone] Fax: [phone]    Results for orders placed or performed in visit on 01/08/19   AMB POC FECAL BLOOD, OCCULT, QL 1 CARD   Result Value Ref Range    VALID INTERNAL CONTROL POC No     Hemoccult (POC) Positive Negative

## 2019-01-08 NOTE — PROGRESS NOTES
Reason for Visit/HPI:    Azalea Hamilton is a 80 y.o. female patient who presents today for   Chief Complaint   Patient presents with    Hemorrhoids     Patient has recently noted a small amount of blood when having a bowel movement. In depth discussion with Mrs. Sarah Beth Pineda with regards to her decision to donate her body to science with the [employer]. I have given her a list of the processes that need to be completed even with her wishes to be carried out. Discussed also with Mrs. Sarah Beth Pineda working up her new onset rectal bleeding she is seeing more frequent. I discussed options for working this up and various conservative options. She is currently declining further work up with GI referral and other diagnostics. I agree with her to just keep an eye on it and work on getting her weight up to keep her strength. We can revisit this at anytime if things start this get more intense. Diagnosis/Treatment Plan:    Diagnoses and all orders for this visit:    1. Rectal bleeding  Comments:  pt reports small amounts of bright red blood droplets from her bowel movements. It is not everyday she sees it but it has become more frequent. No pain. Orders:  -     AMB POC FECAL BLOOD, OCCULT, QL 1 CARD  -     DIGITAL RECTAL EXAMINATION,    2. Internal hemorrhoids  Comments:  Rectal exam did not show any large, swollen hemorrhoids. No active bleeding seen but some fullness noted in the rectum not swollen or tangible for palpation. Orders:  -     DIGITAL RECTAL EXAMINATION,    3. Celiac disease  Comments:  She has lost 6# in 2 months since changing her diet to Gluten Free. She states she is eating well she believes but there are not many choices at [facility]. Orders:  -     AMB POC FECAL BLOOD, OCCULT, QL 1 CARD    4. Weight loss, unintentional  Comments:  6# weight loss in 2 months since going Gluten Free. Discussed adding more protein to her diet with simple sugars to help boost weight. Also adding boost bid. Orders:  -     DIGITAL RECTAL EXAMINATION,    5. OAB (overactive bladder)  Comments:  Chronic, ongoing. She has adapted to not going out much as she states she has urgency. She declines further treatment for this. Discontinued Care: The following treatment modalities have been discontinued by the provider today:   There are no discontinued medications. Follow Up: Follow-up Disposition:  Return in about 1 week (around 1/15/2019) for with the NP for follow up to your treatment plan. Subjective: Allergies   Allergen Reactions    Latex Rash    Orabase (Benzocaine) [Benzocaine] Anaphylaxis    Adhesive Rash    Codeine Nausea and Vomiting    Darvocet A500 [Propoxyphene N-Acetaminophen] Unknown (comments)    Hydrocodone Unknown (comments)    Myrbetriq [Mirabegron] Myalgia     Reports leg cramps from the medication. Prior to Admission medications    Medication Sig Start Date End Date Taking? Authorizing Provider   hydroCHLOROthiazide (HYDRODIURIL) 12.5 mg tablet Take 1 Tab by mouth daily. 12/17/18  Yes Tali Ceballos NP   Calcium-Cholecalciferol, D3, (CALCIUM 600 WITH VITAMIN D3) 600 mg(1,500mg) -400 unit cap Take 1 Tab by mouth daily. Indications: Osteoporosis 7/25/18  Yes Tali Ceballos NP   aspirin delayed-release 81 mg tablet Take 1 Tab by mouth daily. 3/20/18  Yes Tali Ceballos NP   atenolol (TENORMIN) 100 mg tablet Take 1 Tab by mouth daily. Indications: hypertension 3/20/18  Yes Tali Ceballos NP   loperamide (IMODIUM) 2 mg capsule Take 2 mg by mouth every other day. Yes Provider, Historical   carbamide peroxide (DEBROX) 6.5 % otic solution Administer 5 Drops in right ear two (2) times a week. 1/6/17  Yes Dyana Mancuso NP   acetaminophen (TYLENOL) 325 mg tablet Take  by mouth every four (4) hours as needed for Pain. Yes Provider, Historical   peg 400-propylene glycol (SYSTANE) 0.4-0.3 % drop Administer 1 Drop to both eyes four (4) times daily.    Yes Provider, Historical   amoxicillin (AMOXIL) 500 mg capsule Take 500 mg by mouth. Take 4 caps one hour prior to dental appointments. Indications: prophylactic   Yes Provider, Historical     Past Medical History:   Diagnosis Date    Allergic rhinitis     Arthritis     Benign hematuria     negative w/u by Dr. Shaan Rosenthal    Hematuria     Hypertension     Osteoarthritis     Osteopenia     Overactive bladder     Rosacea     Rosacea conjunctivitis(372.31)     Shingles     Stress incontinence      Past Surgical History:   Procedure Laterality Date    ENDOSCOPY, COLON, DIAGNOSTIC  7/2005    (Horris Shield)    HX CATARACT REMOVAL  7/2004    bilateral    HX ORTHOPAEDIC      toe surgery for bone spurs    HX IRENE AND BSO  1980    TOTAL HIP ARTHROPLASTY  2000    left    TOTAL HIP ARTHROPLASTY  2001    right      Social History     Tobacco Use    Smoking status: Never Smoker    Smokeless tobacco: Never Used   Substance Use Topics    Alcohol use: No     Alcohol/week: 0.0 oz    Drug use: No      Family History   Problem Relation Age of Onset    Stroke Mother         x2    Heart Attack Mother 61    Parkinsonism Father     Breast Cancer Sister     Hypertension Sister     Arthritis-osteo Sister     Heart defect Sister         MVP    Stroke Sister     Arthritis-osteo Sister     Hypertension Sister     Diabetes Sister     Breast Cancer Sister     Other Sister         polycythemia vera    Allergic Rhinitis Son     Hypertension Son     Headache Son     Allergic Rhinitis Daughter     Allergic Rhinitis Son      Advance Care Planning 10/16/2018   Patient's Healthcare Decision Maker is: Named in scanned ACP document   Primary Decision Maker Name Selina Courser   Primary Decision Maker Phone Number [phone]   Primary Decision Maker Relationship to Patient Adult child   Confirm Advance Directive Yes, on file   Does the patient have other document types Do Not Resuscitate; Power of [employer] Results:     Office Visit on 01/08/2019   Component Date Value Ref Range Status    VALID INTERNAL CONTROL POC 01/08/2019 No   Final    Hemoccult (POC) 01/08/2019 Positive  Negative Final   Orders Only on 10/16/2018   Component Date Value Ref Range Status    Calprotectin, Fecal 10/17/2018 <16  0 - 120 ug/g Final    Comment: Concentration     Interpretation   Follow-Up  <16 - 50 ug/g     Normal           None  >50 -120 ug/g     Borderline       Re-evaluate in 4-6 weeks      >120 ug/g     Abnormal         Repeat as clinically                                     indicated      Fecal Neutral Fats: 10/17/2018 Normal   Final                                   Normal (<60 Droplets/HPF)    Fats, Total 10/17/2018 Normal   Final                                  Normal (<100 Droplets/HPF)    Muscle Fiber, Stool 10/17/2018 Normal   Final                                     Normal (<10 Fibers/HPF)   Office Visit on 10/16/2018   Component Date Value Ref Range Status    Amylase 10/16/2018 146* 31 - 124 U/L Final    Lipase 10/16/2018 47  14 - 85 U/L Final    C-Reactive Protein, Qt 10/16/2018 0.8  0.0 - 4.9 mg/L Final    Kimberlyn 10/16/2018 1  0 - 50 units Final    Comment:                                  Negative        <45                                   Equivocal   45 - 50                                   Positive        >50      ACCA 10/16/2018 18  0 - 90 units Final    Comment:                                  Negative        <80                                   Equivocal   80 - 90                                   Positive        >90      ALCA 10/16/2018 1  0 - 60 units Final    Comment:                                  Negative        <55                                   Equivocal   55 - 60                                   Positive        >60      AMCA 10/16/2018 10  0 - 100 units Final    Comment:                                  Negative        < 90                                   Equivocal   90 - 100 Positive        >100   This test was developed and its performance   characteristics determined by [employer].  It has not   been cleared or approved by the Food and Drug   Administration. The FDA has determined that such   clearance or approval is not necessary.  Atypical pANCA 10/16/2018 Negative  Negative Final    Comments 10/16/2018 Comment   Final    Pattern is not suggestive of Inflammatory Bowel Disease    Endomysial Ab, IgA 10/16/2018 Negative  Negative Final    t-Transglutaminase, IgA 10/16/2018 <2  0 - 3 U/mL Final    Comment:                               Negative        0 -  3                                Weak Positive   4 - 10                                Positive           >10   Tissue Transglutaminase (tTG) has been identified   as the endomysial antigen. Studies have demonstr-   ated that endomysial IgA antibodies have over 99%   specificity for gluten sensitive enteropathy.  Immunoglobulin A, Qt. 10/16/2018 498* 64 - 422 mg/dL Final    tTG/DGP screen 10/16/2018 Negative  Negative Final    S. cerevisiae, IgG 10/16/2018 <20.0  0.0 - 24.9 Units Final    Comment:                                 Negative         <20.0                                  Equivocal  20.1 - 24.9                                  Positive     >or= 25.0      Atypical pANCA 10/16/2018 Negative  Negative Final    NOTE 10/16/2018 [place] continues   Final    Anti-Gliadin, IgG (Native) 10/16/2018 6  0 - 19 units Final    Comment:                    Negative                   0 - 19                     Weak Positive             20 - 30                     Moderate to Strong Positive   >30      NOTE 10/16/2018 Comment   Final    Comment: Suggestive of irritable bowel syndrome (IBS). Careful evaluation of  the patient's history, physical examination, and application of Juwan  III diagnostic criteria may help to rule in or rule out the diagnosis  of IBS.   Subsequent testing for Fecal Calprotectin (964129) may be  recommended. If IBD is strongly suspected, subsequent testing with  the Crohn's Disease Prognostic Profile (875229) that includes anti-  glycan antibodies AMCA, ALCA, ACCA, and Kimberlyn may aid in differential  diagnosis. Clinical Support on 10/10/2018   Component Date Value Ref Range Status    WBC 10/10/2018 5.8  3.4 - 10.8 x10E3/uL Final    RBC 10/10/2018 4.49  3.77 - 5.28 x10E6/uL Final    HGB 10/10/2018 13.5  11.1 - 15.9 g/dL Final    HCT 10/10/2018 39.9  34.0 - 46.6 % Final    MCV 10/10/2018 89  79 - 97 fL Final    MCH 10/10/2018 30.1  26.6 - 33.0 pg Final    MCHC 10/10/2018 33.8  31.5 - 35.7 g/dL Final    RDW 10/10/2018 13.7  12.3 - 15.4 % Final    PLATELET 68/17/5336 793  150 - 379 x10E3/uL Final    NEUTROPHILS 10/10/2018 67  Not Estab. % Final    Lymphocytes 10/10/2018 22  Not Estab. % Final    MONOCYTES 10/10/2018 9  Not Estab. % Final    EOSINOPHILS 10/10/2018 1  Not Estab. % Final    BASOPHILS 10/10/2018 1  Not Estab. % Final    ABS. NEUTROPHILS 10/10/2018 4.0  1.4 - 7.0 x10E3/uL Final    Abs Lymphocytes 10/10/2018 1.3  0.7 - 3.1 x10E3/uL Final    ABS. MONOCYTES 10/10/2018 0.5  0.1 - 0.9 x10E3/uL Final    ABS. EOSINOPHILS 10/10/2018 0.0  0.0 - 0.4 x10E3/uL Final    ABS. BASOPHILS 10/10/2018 0.0  0.0 - 0.2 x10E3/uL Final    IMMATURE GRANULOCYTES 10/10/2018 0  Not Estab. % Final    ABS. IMM. GRANS.  10/10/2018 0.0  0.0 - 0.1 x10E3/uL Final    Glucose 10/10/2018 93  65 - 99 mg/dL Final    BUN 10/10/2018 20  8 - 27 mg/dL Final    Creatinine 10/10/2018 0.78  0.57 - 1.00 mg/dL Final    GFR est non-AA 10/10/2018 69  >59 mL/min/1.73 Final    GFR est AA 10/10/2018 79  >59 mL/min/1.73 Final    BUN/Creatinine ratio 10/10/2018 26  12 - 28 Final    Sodium 10/10/2018 143  134 - 144 mmol/L Final    Potassium 10/10/2018 4.8  3.5 - 5.2 mmol/L Final    Chloride 10/10/2018 99  96 - 106 mmol/L Final    CO2 10/10/2018 25  20 - 29 mmol/L Final    Calcium 10/10/2018 9.9  8.7 - 10.3 mg/dL Final    Protein, total 10/10/2018 6.6  6.0 - 8.5 g/dL Final    Albumin 10/10/2018 4.3  3.5 - 4.7 g/dL Final    GLOBULIN, TOTAL 10/10/2018 2.3  1.5 - 4.5 g/dL Final    A-G Ratio 10/10/2018 1.9  1.2 - 2.2 Final    Bilirubin, total 10/10/2018 0.5  0.0 - 1.2 mg/dL Final    Alk. phosphatase 10/10/2018 75  39 - 117 IU/L Final    AST (SGOT) 10/10/2018 33  0 - 40 IU/L Final    ALT (SGPT) 10/10/2018 17  0 - 32 IU/L Final    VITAMIN D, 25-HYDROXY 10/10/2018 42.8  30.0 - 100.0 ng/mL Final    Comment: Vitamin D deficiency has been defined by the [facility] practice guideline as a  level of serum 25-OH vitamin D less than 20 ng/mL (1,2). The Endocrine Society went on to further define vitamin D  insufficiency as a level between 21 and 29 ng/mL (2). 1. IOM ([place] [employer]). 2010. Dietary reference     intakes for calcium and D. [facility]: The     [employer]. 2. Memo MF, Samuel NC, Andrei MOSQUERA, et al.     Evaluation, treatment, and prevention of vitamin D     deficiency: an Endocrine Society clinical practice     guideline. JCEM. 2011 Jul; 96(7):1911-30.  TSH 10/10/2018 2.130  0.450 - 4.500 uIU/mL Final    Cholesterol, total 10/10/2018 162  100 - 199 mg/dL Final    Triglyceride 10/10/2018 77  0 - 149 mg/dL Final    HDL Cholesterol 10/10/2018 78  >39 mg/dL Final    Comment: **Effective October 22, 2018, HDL Cholesterol**    reference interval will be changing to:                                     Male        Female                                36 - [id number]   48 - 500593      VLDL, calculated 10/10/2018 15  5 - 40 mg/dL Final    LDL, calculated 10/10/2018 69  0 - 99 mg/dL Final    Hemoglobin A1c 10/10/2018 5.8* 4.8 - 5.6 % Final    Comment:          Prediabetes: 5.7 - 6.4           Diabetes: >6.4           Glycemic control for adults with diabetes: <7.0      Estimated average glucose 10/10/2018 120  mg/dL Final       Objective: Vitals:    01/08/19 0946   BP: 140/78   Pulse: 88   Resp: 18   SpO2: 98%   Weight: 111 lb 8 oz (50.6 kg)   Height: 5' 1\" (1.549 m)     Wt Readings from Last 3 Encounters:   01/08/19 111 lb 8 oz (50.6 kg)   10/26/18 112 lb (50.8 kg)   10/16/18 114 lb 14.4 oz (52.1 kg)     BP Readings from Last 3 Encounters:   01/08/19 140/78   10/26/18 135/58   10/16/18 139/63     Review of Systems   Constitutional: Positive for fatigue and unexpected weight change. Negative for activity change, appetite change and fever. HENT: Negative for congestion, dental problem, hearing loss, postnasal drip, rhinorrhea, sinus pressure, sinus pain, sneezing, sore throat, trouble swallowing and voice change. Eyes: Negative for discharge, redness and visual disturbance. Respiratory: Negative for apnea, cough, chest tightness, shortness of breath and wheezing. Cardiovascular: Negative for chest pain, palpitations and leg swelling. Gastrointestinal: Positive for abdominal distention, blood in stool and rectal pain. Negative for abdominal pain, constipation, diarrhea, nausea and vomiting. Endocrine: Negative for polydipsia, polyphagia and polyuria. Genitourinary: Positive for frequency and urgency. Negative for flank pain. Musculoskeletal: Positive for arthralgias, gait problem, joint swelling and myalgias. Negative for back pain and neck pain. Skin: Negative for color change, pallor, rash and wound. Allergic/Immunologic: Negative for environmental allergies and food allergies. Neurological: Negative for dizziness, tremors, weakness, light-headedness, numbness and headaches. Hematological: Negative for adenopathy. Psychiatric/Behavioral: Positive for confusion. Negative for agitation and sleep disturbance. The patient is not nervous/anxious. Physical Exam   Constitutional: Vital signs are normal. She appears malnourished and dehydrated. She appears to not be writhing in pain. She appears unhealthy.  She does not have a sickly appearance. No distress. Frail, fragile, elderly  female, alert and oriented x 2. Trouble finding words and staying on task with the appointment today. Slightly more fatigued today. HENT:   Head: Normocephalic and atraumatic. Right Ear: Tympanic membrane, external ear and ear canal normal. Decreased hearing is noted. Left Ear: Tympanic membrane, external ear and ear canal normal. Decreased hearing is noted. Nose: No mucosal edema, rhinorrhea or sinus tenderness. Right sinus exhibits no frontal sinus tenderness. Left sinus exhibits no frontal sinus tenderness. Mouth/Throat: Uvula is midline. Mucous membranes are not pale, not dry and not cyanotic. No oral lesions. No uvula swelling. No posterior oropharyngeal edema or posterior oropharyngeal erythema. Eyes: Conjunctivae, EOM and lids are normal. Pupils are equal, round, and reactive to light. Lids are everted and swept, no foreign bodies found. Neck: Trachea normal, normal range of motion and full passive range of motion without pain. Neck supple. No hepatojugular reflux and no JVD present. Carotid bruit is not present. No thyromegaly present. Cardiovascular: Normal rate, regular rhythm, S1 normal, S2 normal, normal heart sounds, intact distal pulses and normal pulses. Occasional extrasystoles are present. Exam reveals no gallop and no friction rub. No murmur heard. No lower extremity edema present today. Pulmonary/Chest: Effort normal and breath sounds normal.   Abdominal: Soft. Normal appearance. She exhibits distension. She exhibits no fluid wave and no mass. Bowel sounds are hyperactive. There is no hepatosplenomegaly. There is no tenderness. There is no rebound and no CVA tenderness. Musculoskeletal: Normal range of motion. Chronic muscular weakness generalized, chronic joint disfiguration due to arthritis. Lymphadenopathy:        Head (right side): No submandibular adenopathy present.         Head (left side): No submandibular and no tonsillar adenopathy present. She has no cervical adenopathy. She has no axillary adenopathy. Neurological: She is alert. She has normal sensation, normal reflexes and intact cranial nerves. She is disoriented. She displays weakness and atrophy. She exhibits abnormal muscle tone. Gait normal. Coordination and gait normal.   Skin: Skin is warm and dry. No bruising, no ecchymosis and no rash noted. She is not diaphoretic. No cyanosis. There is pallor. Nails show no clubbing. Psychiatric: Mood, memory, affect and judgment normal.   Baseline mood and affect unchanged from normal. Extremely tired in appearance. Nursing note and vitals reviewed. Disclaimer:   Ms. Jeffrey Ramírez has been advised to call or return to our office if symptoms worsen/change/persist. We as a care team including the patient; discussed expected course/resolution/complications of diagnosis in detail today. Medication risks/benefits/costs/interactions/alternatives discussed Fauziamarli Desiree was given an after visit summary which includes diagnoses, current medications, & vitals. Jeffrey Ramírez expressed understanding with the diagnosis and plan.

## 2019-01-16 ENCOUNTER — CLINICAL SUPPORT (OUTPATIENT)
Dept: GERIATRIC MEDICINE | Age: 84
End: 2019-01-16

## 2019-01-16 VITALS — WEIGHT: 113 LBS | BODY MASS INDEX: 21.35 KG/M2

## 2019-01-16 DIAGNOSIS — R63.4 WEIGHT LOSS, UNINTENTIONAL: Primary | ICD-10-CM

## 2019-01-17 DIAGNOSIS — M19.90 OSTEOARTHRITIS, UNSPECIFIED OSTEOARTHRITIS TYPE, UNSPECIFIED SITE: ICD-10-CM

## 2019-01-17 RX ORDER — CHROMIUM PICOLINATE 200 MCG
1 TABLET ORAL DAILY
Qty: 90 CAP | Refills: 1 | Status: SHIPPED | OUTPATIENT
Start: 2019-01-17 | End: 2019-07-24 | Stop reason: SDUPTHER

## 2019-01-23 ENCOUNTER — CLINICAL SUPPORT (OUTPATIENT)
Dept: GERIATRIC MEDICINE | Age: 84
End: 2019-01-23

## 2019-01-23 VITALS — WEIGHT: 112.9 LBS | BODY MASS INDEX: 21.33 KG/M2

## 2019-01-23 DIAGNOSIS — R63.4 WEIGHT LOSS, UNINTENTIONAL: Primary | ICD-10-CM

## 2019-01-23 NOTE — PROGRESS NOTES
ADVISED PATIENT OF THE FOLLOWING HEALTH MAINTAINCE DUE  Health Maintenance Due   Topic Date Due    GLAUCOMA SCREENING Q2Y  01/23/2019      Chief Complaint   Patient presents with    Weight Management     Patient came in for weight check. 1. Have you been to the ER, urgent care clinic since your last visit? Hospitalized since your last visit? No    2. Have you seen or consulted any other health care providers outside of the [address] [place] [address] since your last visit? Include any DEXA scan, mammography  or colon screening. No    3. Do you have an Advance Directive on file? yes    4. Do you have a DNR on file? DNR    Patient is accompanied by self I have received verbal consent from Ena Hernandez to discuss any/all medical information while they are present in the room. Wt 112 lb 14.4 oz (51.2 kg)   BMI 21.33 kg/m²     Advance Care Planning 10/16/2018   Patient's Healthcare Decision Maker is: Named in scanned ACP document   Primary Decision Maker Name Shawnee Chen   Primary Decision Maker Phone Number [phone]   Primary Decision Maker Relationship to Patient Adult child   Confirm Advance Directive Yes, on file   Does the patient have other document types Do Not Resuscitate; Power of [facility] [address] [place] [address], [address] [place], [place] [address]Y AT [address] [place] [address] Rd of [address] [facility]  [address] [address]  Phone: [phone] Fax: [phone]

## 2019-01-30 ENCOUNTER — CLINICAL SUPPORT (OUTPATIENT)
Dept: GERIATRIC MEDICINE | Age: 84
End: 2019-01-30

## 2019-01-30 VITALS — WEIGHT: 114.6 LBS | BODY MASS INDEX: 21.65 KG/M2

## 2019-01-30 DIAGNOSIS — R63.4 WEIGHT LOSS, UNINTENTIONAL: Primary | ICD-10-CM

## 2019-01-30 NOTE — PROGRESS NOTES
ADVISED PATIENT OF THE FOLLOWING HEALTH MAINTAINCE DUE  Health Maintenance Due   Topic Date Due    GLAUCOMA SCREENING Q2Y  01/23/2019      Chief Complaint   Patient presents with    Weight Management     Patient being seen for weight check. She is up 2# today. She continues boost supplements. 1. Have you been to the ER, urgent care clinic since your last visit? Hospitalized since your last visit? No    2. Have you seen or consulted any other health care providers outside of the [address] [place] [address] since your last visit? Include any DEXA scan, mammography  or colon screening. No    3. Do you have an Advance Directive on file? yes    4. Do you have a DNR on file? DNR    Patient is accompanied by self I have received verbal consent from Asaf Gurein to discuss any/all medical information while they are present in the room. Wt 114 lb 9.6 oz (52 kg)   BMI 21.65 kg/m²       Advance Care Planning 10/16/2018   Patient's Healthcare Decision Maker is: Named in scanned ACP document   Primary Decision Maker Name Александр Chaudhary   Primary Decision Maker Phone Number [phone]   Primary Decision Maker Relationship to Patient Adult child   Confirm Advance Directive Yes, on file   Does the patient have other document types Do Not Resuscitate; Power of "[facility] [address] [place] [address], [address] [place] [address] AT [address] [place] [address] Rd of [address] [facility]  [address] [address]  Phone: [phone] Fax: [phone]

## 2019-02-06 ENCOUNTER — CLINICAL SUPPORT (OUTPATIENT)
Dept: GERIATRIC MEDICINE | Age: 84
End: 2019-02-06

## 2019-02-06 VITALS — BODY MASS INDEX: 21.24 KG/M2 | WEIGHT: 112.4 LBS

## 2019-02-06 DIAGNOSIS — R63.4 WEIGHT LOSS, UNINTENTIONAL: Primary | ICD-10-CM

## 2019-02-06 NOTE — PROGRESS NOTES
ADVISED PATIENT OF THE FOLLOWING HEALTH MAINTAINCE DUE  Health Maintenance Due   Topic Date Due    GLAUCOMA SCREENING Q2Y  01/23/2019      Chief Complaint   Patient presents with    Weight Management     Patient came in for weight check. 1. Have you been to the ER, urgent care clinic since your last visit? Hospitalized since your last visit? No    2. Have you seen or consulted any other health care providers outside of the [address] [place] [address] since your last visit? Include any DEXA scan, mammography  or colon screening. No    3. Do you have an Advance Directive on file? yes    4. Do you have a DNR on file? DNR    Patient is accompanied by self I have received verbal consent from Wei Choudhury to discuss any/all medical information while they are present in the room. Wt 112 lb 6.4 oz (51 kg)   BMI 21.24 kg/m²       Advance Care Planning 10/16/2018   Patient's Healthcare Decision Maker is: Named in scanned ACP document   Primary Decision Maker Name Carl Doll   Primary Decision Maker Phone Number [phone]   Primary Decision Maker Relationship to Patient Adult child   Confirm Advance Directive Yes, on file   Does the patient have other document types Do Not Resuscitate; [facility] of [facility] [address] [place] [address], [address] [place], [place] [address]Y AT [address] [place] [address] Rd of [address] [address]  Phone: [phone] Fax: [phone]

## 2019-02-13 ENCOUNTER — CLINICAL SUPPORT (OUTPATIENT)
Dept: GERIATRIC MEDICINE | Age: 84
End: 2019-02-13

## 2019-02-13 VITALS — BODY MASS INDEX: 21.54 KG/M2 | WEIGHT: 114 LBS

## 2019-02-13 DIAGNOSIS — R63.4 WEIGHT LOSS, UNINTENTIONAL: Primary | ICD-10-CM

## 2019-02-13 NOTE — PROGRESS NOTES
Chief Complaint   Patient presents with    Weight Management     patient being seen for weight check. Patient reports drinking 2 ensure every other day with alternating 1 ensure every other day.      Wt 114 lb (51.7 kg)   BMI 21.54 kg/m²

## 2019-02-21 ENCOUNTER — CLINICAL SUPPORT (OUTPATIENT)
Dept: GERIATRIC MEDICINE | Age: 84
End: 2019-02-21

## 2019-02-21 VITALS — WEIGHT: 114.5 LBS | BODY MASS INDEX: 21.63 KG/M2

## 2019-02-21 DIAGNOSIS — R63.4 WEIGHT LOSS, UNINTENTIONAL: Primary | ICD-10-CM

## 2019-02-21 NOTE — PROGRESS NOTES
ADVISED PATIENT OF THE FOLLOWING HEALTH MAINTAINCE DUE  Health Maintenance Due   Topic Date Due    GLAUCOMA SCREENING Q2Y  01/23/2019      Chief Complaint   Patient presents with    Weight Management     Patient being seen for weekly weight check. She is stable today. 1. Have you been to the ER, urgent care clinic since your last visit? Hospitalized since your last visit? No    2. Have you seen or consulted any other health care providers outside of the [facility] since your last visit? Include any DEXA scan, mammography  or colon screening. No    3. Do you have an Advance Directive on file? yes    4. Do you have a DNR on file? DNR    Patient is accompanied by self I have received verbal consent from [employer] to discuss any/all medical information while they are present in the room. Wt 114 lb 8 oz (51.9 kg)   BMI 21.63 kg/m²       Advance Care Planning 10/16/2018   Patient's Healthcare Decision Maker is: Named in scanned ACP document   Primary Decision Maker Name Oniel Lee   Primary Decision Maker Phone Number [phone]   Primary Decision Maker Relationship to Patient Adult child   Confirm Advance Directive Yes, on file   Does the patient have other document types Do Not Resuscitate; Power of [employer] [facility] [address] [place] [address], [address] [place], [place] [address]Y AT [address] [place] [address] Rd of [facility]  [address] [address]  Phone: [phone] Fax: [phone]

## 2019-02-27 ENCOUNTER — CLINICAL SUPPORT (OUTPATIENT)
Dept: GERIATRIC MEDICINE | Age: 84
End: 2019-02-27

## 2019-02-27 VITALS — WEIGHT: 115 LBS | BODY MASS INDEX: 21.73 KG/M2

## 2019-02-27 DIAGNOSIS — R63.4 WEIGHT LOSS, UNINTENTIONAL: Primary | ICD-10-CM

## 2019-02-27 NOTE — PROGRESS NOTES
ADVISED PATIENT OF THE FOLLOWING HEALTH MAINTAINCE DUE  Health Maintenance Due   Topic Date Due    GLAUCOMA SCREENING Q2Y  01/23/2019      Chief Complaint   Patient presents with    Weight Management     Patient being seen for weekly weight check. 1. Have you been to the ER, urgent care clinic since your last visit? Hospitalized since your last visit? No    2. Have you seen or consulted any other health care providers outside of the [address] [place] [address] since your last visit? Include any DEXA scan, mammography  or colon screening. No    3. Do you have an Advance Directive on file? yes    4. Do you have a DNR on file? DNR    Patient is accompanied by self I have received verbal consent from Yahaira Hernandez to discuss any/all medical information while they are present in the room. Wt 115 lb (52.2 kg)   BMI 21.73 kg/m²     Advance Care Planning 10/16/2018   Patient's Healthcare Decision Maker is: Named in scanned ACP document   Primary Decision Maker Name Donte Mcleod   Primary Decision Maker Phone Number [phone]   Primary Decision Maker Relationship to Patient Adult child   Confirm Advance Directive Yes, on file   Does the patient have other document types Do Not Resuscitate; Power of [employer] [address] [place] [address], [address] [place], [place] [address]Y AT [address] [place] [address] Rd of [facility]  [address] [address]  Phone: [phone] Fax: [phone]

## 2019-03-06 ENCOUNTER — CLINICAL SUPPORT (OUTPATIENT)
Dept: GERIATRIC MEDICINE | Age: 84
End: 2019-03-06

## 2019-03-06 VITALS — BODY MASS INDEX: 21.73 KG/M2 | WEIGHT: 115 LBS

## 2019-03-06 DIAGNOSIS — R63.4 WEIGHT LOSS, UNINTENTIONAL: Primary | ICD-10-CM

## 2019-03-06 NOTE — PROGRESS NOTES
ADVISED PATIENT OF THE FOLLOWING HEALTH MAINTAINCE DUE  Health Maintenance Due   Topic Date Due    GLAUCOMA SCREENING Q2Y  01/23/2019      Chief Complaint   Patient presents with    Weight Loss     Patient being seen once weightly for weight check, Weight is stable at 115#. Patient advised to continue ensure/boost. Come in every 2-3 weeks for weight check. 1. Have you been to the ER, urgent care clinic since your last visit? Hospitalized since your last visit? No    2. Have you seen or consulted any other health care providers outside of the [address] [place] [address] since your last visit? Include any DEXA scan, mammography  or colon screening. No    3. Do you have an Advance Directive on file? yes    4. Do you have a DNR on file? DNR    Patient is accompanied by self I have received verbal consent from Judit Foreman to discuss any/all medical information while they are present in the room. Wt 115 lb (52.2 kg)   BMI 21.73 kg/m²     Advance Care Planning 10/16/2018   Patient's Healthcare Decision Maker is: Named in scanned ACP document   Primary Decision Maker Name Fela Blum   Primary Decision Maker Phone Number [phone]   Primary Decision Maker Relationship to Patient Adult child   Confirm Advance Directive Yes, on file   Does the patient have other document types Do Not Resuscitate; Power of [facility] [address] [facility], [address] [place] [address] AT [address] [place] [address] of [address] [place] [address]  [address] [address]  Phone: [phone] Fax: [phone]        Patient reminded during visit to bring all medication bottles, OTC medications to all appointments.

## 2019-03-07 DIAGNOSIS — I10 HYPERTENSION, UNSPECIFIED TYPE: ICD-10-CM

## 2019-03-08 RX ORDER — ATENOLOL 100 MG/1
100 TABLET ORAL DAILY
Qty: 90 TAB | Refills: 3 | Status: SHIPPED | OUTPATIENT
Start: 2019-03-08 | End: 2020-03-11 | Stop reason: SDUPTHER

## 2019-04-24 ENCOUNTER — CLINICAL SUPPORT (OUTPATIENT)
Dept: GERIATRIC MEDICINE | Age: 84
End: 2019-04-24

## 2019-04-24 VITALS — BODY MASS INDEX: 21.99 KG/M2 | WEIGHT: 116.4 LBS

## 2019-04-24 DIAGNOSIS — R63.4 WEIGHT LOSS, UNINTENTIONAL: Primary | ICD-10-CM

## 2019-04-24 NOTE — PROGRESS NOTES
Chief Complaint   Patient presents with    Weight Management     Pt here for weight check.      Wt 116 lb 6.4 oz (52.8 kg)   BMI 21.99 kg/m²

## 2019-06-12 DIAGNOSIS — I10 HYPERTENSION, UNSPECIFIED TYPE: ICD-10-CM

## 2019-06-12 RX ORDER — HYDROCHLOROTHIAZIDE 12.5 MG/1
12.5 TABLET ORAL DAILY
Qty: 90 TAB | Refills: 1 | Status: SHIPPED | OUTPATIENT
Start: 2019-06-12 | End: 2019-06-19 | Stop reason: SDUPTHER

## 2019-06-18 ENCOUNTER — CLINICAL SUPPORT (OUTPATIENT)
Dept: GERIATRIC MEDICINE | Age: 84
End: 2019-06-18

## 2019-06-18 VITALS — WEIGHT: 115.5 LBS | BODY MASS INDEX: 21.82 KG/M2

## 2019-06-18 DIAGNOSIS — R63.4 WEIGHT LOSS, UNINTENTIONAL: Primary | ICD-10-CM

## 2019-06-18 NOTE — PROGRESS NOTES
Chief Complaint   Patient presents with    Weight Management     Patient here for weight check.      Wt 115 lb 8 oz (52.4 kg)   BMI 21.82 kg/m²

## 2019-06-19 DIAGNOSIS — I10 HYPERTENSION, UNSPECIFIED TYPE: ICD-10-CM

## 2019-06-19 RX ORDER — HYDROCHLOROTHIAZIDE 12.5 MG/1
12.5 TABLET ORAL DAILY
Qty: 90 TAB | Refills: 1 | Status: SHIPPED | OUTPATIENT
Start: 2019-06-19 | End: 2019-12-12 | Stop reason: SDUPTHER

## 2019-06-21 ENCOUNTER — CLINICAL SUPPORT (OUTPATIENT)
Dept: GERIATRIC MEDICINE | Age: 84
End: 2019-06-21

## 2019-06-21 DIAGNOSIS — R73.03 PREDIABETES: ICD-10-CM

## 2019-06-21 DIAGNOSIS — M15.9 PRIMARY OSTEOARTHRITIS INVOLVING MULTIPLE JOINTS: ICD-10-CM

## 2019-06-21 DIAGNOSIS — E55.9 VITAMIN D DEFICIENCY: ICD-10-CM

## 2019-06-21 DIAGNOSIS — E78.5 HYPERLIPIDEMIA, UNSPECIFIED HYPERLIPIDEMIA TYPE: ICD-10-CM

## 2019-06-21 DIAGNOSIS — I10 ESSENTIAL HYPERTENSION: Primary | Chronic | ICD-10-CM

## 2019-06-21 DIAGNOSIS — E78.2 MIXED HYPERLIPIDEMIA: ICD-10-CM

## 2019-06-21 DIAGNOSIS — K90.0 CELIAC DISEASE: Chronic | ICD-10-CM

## 2019-06-21 DIAGNOSIS — I10 HYPERTENSION, UNSPECIFIED TYPE: ICD-10-CM

## 2019-06-22 LAB
25(OH)D3+25(OH)D2 SERPL-MCNC: 41.1 NG/ML (ref 30–100)
ALBUMIN SERPL-MCNC: 4.1 G/DL (ref 3.5–4.7)
ALBUMIN/GLOB SERPL: 1.6 {RATIO} (ref 1.2–2.2)
ALP SERPL-CCNC: 80 IU/L (ref 39–117)
ALT SERPL-CCNC: 16 IU/L (ref 0–32)
AST SERPL-CCNC: 33 IU/L (ref 0–40)
BASOPHILS # BLD AUTO: 0 X10E3/UL (ref 0–0.2)
BASOPHILS NFR BLD AUTO: 0 %
BILIRUB SERPL-MCNC: 0.4 MG/DL (ref 0–1.2)
BUN SERPL-MCNC: 18 MG/DL (ref 8–27)
BUN/CREAT SERPL: 23 (ref 12–28)
CALCIUM SERPL-MCNC: 9.4 MG/DL (ref 8.7–10.3)
CHLORIDE SERPL-SCNC: 100 MMOL/L (ref 96–106)
CHOLEST SERPL-MCNC: 216 MG/DL (ref 100–199)
CO2 SERPL-SCNC: 27 MMOL/L (ref 20–29)
CREAT SERPL-MCNC: 0.77 MG/DL (ref 0.57–1)
EOSINOPHIL # BLD AUTO: 0.1 X10E3/UL (ref 0–0.4)
EOSINOPHIL NFR BLD AUTO: 2 %
ERYTHROCYTE [DISTWIDTH] IN BLOOD BY AUTOMATED COUNT: 14.2 % (ref 12.3–15.4)
EST. AVERAGE GLUCOSE BLD GHB EST-MCNC: 120 MG/DL
FOLATE SERPL-MCNC: >20 NG/ML
GLOBULIN SER CALC-MCNC: 2.5 G/DL (ref 1.5–4.5)
GLUCOSE SERPL-MCNC: 102 MG/DL (ref 65–99)
HBA1C MFR BLD: 5.8 % (ref 4.8–5.6)
HCT VFR BLD AUTO: 43 % (ref 34–46.6)
HDLC SERPL-MCNC: 71 MG/DL
HGB BLD-MCNC: 14.5 G/DL (ref 11.1–15.9)
IMM GRANULOCYTES # BLD AUTO: 0 X10E3/UL (ref 0–0.1)
IMM GRANULOCYTES NFR BLD AUTO: 0 %
LDLC SERPL CALC-MCNC: 123 MG/DL (ref 0–99)
LYMPHOCYTES # BLD AUTO: 1.6 X10E3/UL (ref 0.7–3.1)
LYMPHOCYTES NFR BLD AUTO: 29 %
MCH RBC QN AUTO: 30.4 PG (ref 26.6–33)
MCHC RBC AUTO-ENTMCNC: 33.7 G/DL (ref 31.5–35.7)
MCV RBC AUTO: 90 FL (ref 79–97)
MONOCYTES # BLD AUTO: 0.4 X10E3/UL (ref 0.1–0.9)
MONOCYTES NFR BLD AUTO: 8 %
NEUTROPHILS # BLD AUTO: 3.3 X10E3/UL (ref 1.4–7)
NEUTROPHILS NFR BLD AUTO: 61 %
PLATELET # BLD AUTO: 218 X10E3/UL (ref 150–450)
POTASSIUM SERPL-SCNC: 4.5 MMOL/L (ref 3.5–5.2)
PROT SERPL-MCNC: 6.6 G/DL (ref 6–8.5)
RBC # BLD AUTO: 4.77 X10E6/UL (ref 3.77–5.28)
SODIUM SERPL-SCNC: 143 MMOL/L (ref 134–144)
TRIGL SERPL-MCNC: 111 MG/DL (ref 0–149)
TSH SERPL DL<=0.005 MIU/L-ACNC: 2.77 UIU/ML (ref 0.45–4.5)
VIT B12 SERPL-MCNC: 277 PG/ML (ref 232–1245)
VLDLC SERPL CALC-MCNC: 22 MG/DL (ref 5–40)
WBC # BLD AUTO: 5.5 X10E3/UL (ref 3.4–10.8)

## 2019-06-25 ENCOUNTER — TELEPHONE (OUTPATIENT)
Dept: GERIATRIC MEDICINE | Age: 84
End: 2019-06-25

## 2019-06-25 ENCOUNTER — OFFICE VISIT (OUTPATIENT)
Dept: GERIATRIC MEDICINE | Age: 84
End: 2019-06-25

## 2019-06-25 VITALS — HEIGHT: 61 IN | WEIGHT: 116.6 LBS | BODY MASS INDEX: 22.01 KG/M2

## 2019-06-25 DIAGNOSIS — Z71.89 ACP (ADVANCE CARE PLANNING): ICD-10-CM

## 2019-06-25 DIAGNOSIS — Z66 DNR (DO NOT RESUSCITATE): ICD-10-CM

## 2019-06-25 DIAGNOSIS — K90.0 CELIAC DISEASE: ICD-10-CM

## 2019-06-25 DIAGNOSIS — Z00.00 MEDICARE ANNUAL WELLNESS VISIT, SUBSEQUENT: Primary | ICD-10-CM

## 2019-06-25 DIAGNOSIS — I10 ESSENTIAL HYPERTENSION: ICD-10-CM

## 2019-06-25 DIAGNOSIS — R47.89 WORD FINDING DIFFICULTY: ICD-10-CM

## 2019-06-25 DIAGNOSIS — R41.3 MEMORY LOSS: ICD-10-CM

## 2019-06-25 NOTE — PATIENT INSTRUCTIONS
Celiac Disease: Care Instructions  Your Care Instructions  Celiac disease (or celiac sprue) is a problem with digesting gluten. Gluten is a type of protein found in wheat, rye, and other grains. This problem starts when the body's immune system attacks the small intestine when gluten is eaten. The immune system is supposed to fight off viruses and other invaders, but sometimes it turns on the person's own body. (This is called an autoimmune disease.) Celiac disease seems to run in families. Celiac disease causes damage to the small intestine. This makes it hard for the body to absorb vitamins and other nutrients. You cannot prevent celiac disease. But you can stop and reverse the damage to the small intestine by eating a strict gluten-free diet. Follow-up care is a key part of your treatment and safety. Be sure to make and go to all appointments, and call your doctor if you are having problems. It's also a good idea to know your test results and keep a list of the medicines you take. How can you care for yourself at home? · Eat a gluten-free diet to prevent symptoms and damage to the small intestine. Even a small amount of gluten may cause damage. ? Avoid all foods that contain wheat, rye, and barley gluten. Bread, bagels, pasta, pizza, malted breakfast cereals, and crackers are all examples of foods that contain gluten. ? Avoid oats, at least at first. Oats may cause symptoms in some people. The oats may be contaminated with wheat, barley, or rye from processing. But many people who have celiac disease can eat moderate amounts of oats without having symptoms. Health professionals vary in their long-term recommendations regarding eating foods with oats. But most agree it is safe to eat oats labeled as gluten-free. · You may need to avoid milk and milk products for a while.  Once you stop eating any gluten, the intestine will begin to heal. Then it should be okay to drink milk and eat milk products. · Read food labels carefully and look for hidden gluten, such as gluten in medicine and some food additives. If a label says \"modified food starch,\" the product may contain gluten. · Plan your diet around:  ? Eggs. ? Dairy products, if you can eat them. Cheese, yogurt, and other dairy products can be an important part of the diet. ? Flours and foods made with amaranth, arrowroot, beans, buckwheat, corn, cornmeal, flax, millet, potatoes, gluten-free nut and oat bran, quinoa, rice, sorghum, soybeans, tapioca, or teff. ? Fresh, frozen, and canned meats, fruits, and vegetables. Watch for added gluten. · Talk to your doctor or contact your local hospital or dietitian for information about support groups in your area. You may find a support group helpful for discovering ways to help you deal with celiac disease. Celiac disease support groups often share recipes and good food sources. · Look for gluten-free foods. Many food stores, especially health food stores, offer specially marked gluten-free food. When should you call for help? Watch closely for changes in your health, and be sure to contact your doctor if:    · Your bloating, gas, and diarrhea get worse.     · You have bloating, gas, and diarrhea after not having them for a while. Where can you learn more? Go to http://andriy-francisco.info/. Enter [id number] in the search box to learn more about \"Celiac Disease: Care Instructions. \"  Current as of: March 27, 2018  Content Version: 11.9  © 8956-1530 [employer]. Care instructions adapted under license by [employer] (which disclaims liability or warranty for this information). If you have questions about a medical condition or this instruction, always ask your healthcare professional. [address] any warranty or liability for your use of this information.

## 2019-06-25 NOTE — PROGRESS NOTES
Reason For Visit:     Chief Complaint   Patient presents with    Results     review lab results.  Diarrhea     Pt has loose stools often. David Porter is a 80 y.o. female who presents for an annual Medicare Wellness Visit. Patient History:   PSH: Reviewed with patient  Past Surgical History:   Procedure Laterality Date    ENDOSCOPY, COLON, DIAGNOSTIC  7/2005    (Eric Zaman)    HX CATARACT REMOVAL  7/2004    bilateral    HX ORTHOPAEDIC      toe surgery for bone spurs    HX IRENE AND BSO  1980    TOTAL HIP ARTHROPLASTY  2000    left    TOTAL HIP ARTHROPLASTY  2001    right      SH: Reviewed with patient  Social History     Tobacco Use    Smoking status: Never Smoker    Smokeless tobacco: Never Used   Substance Use Topics    Alcohol use: No     Alcohol/week: 0.0 oz    Drug use: No     FH: Reviewed with patient  Family History   Problem Relation Age of Onset    Stroke Mother         x2    Heart Attack Mother 61    Parkinsonism Father     Breast Cancer Sister     Hypertension Sister    Maria Kawasaki Sister     Heart defect Sister         MVP    Stroke Sister    Maria Kawasaki Sister     Hypertension Sister     Diabetes Sister     Breast Cancer Sister     Other Sister         polycythemia vera    Allergic Rhinitis Son     Hypertension Son     Headache Son     Allergic Rhinitis Daughter     Allergic Rhinitis Son      Medications/Allergies: Reviewed with patient. Current Outpatient Medications on File Prior to Visit   Medication Sig Dispense Refill    hydroCHLOROthiazide (HYDRODIURIL) 12.5 mg tablet Take 1 Tab by mouth daily. 90 Tab 1    atenolol (TENORMIN) 100 mg tablet Take 1 Tab by mouth daily. 90 Tab 3    Calcium-Cholecalciferol, D3, (CALCIUM 600 WITH VITAMIN D3) 600 mg(1,500mg) -400 unit cap Take 1 Tab by mouth daily. 90 Cap 1    aspirin delayed-release 81 mg tablet Take 1 Tab by mouth daily. 90 Tab 2    loperamide (IMODIUM) 2 mg capsule Take 2 mg by mouth every other day.  carbamide peroxide (DEBROX) 6.5 % otic solution Administer 5 Drops in right ear two (2) times a week. 150 mL 1    acetaminophen (TYLENOL) 325 mg tablet Take  by mouth every four (4) hours as needed for Pain.  peg 400-propylene glycol (SYSTANE) 0.4-0.3 % drop Administer 1 Drop to both eyes four (4) times daily.  amoxicillin (AMOXIL) 500 mg capsule Take 500 mg by mouth. Take 4 caps one hour prior to dental appointments. Indications: prophylactic       No current facility-administered medications on file prior to visit. Allergies   Allergen Reactions    Latex Rash    Orabase (Benzocaine) [Benzocaine] Anaphylaxis    Adhesive Rash    Codeine Nausea and Vomiting    Darvocet A500 [Propoxyphene N-Acetaminophen] Unknown (comments)    Hydrocodone Unknown (comments)    Myrbetriq [Mirabegron] Myalgia     Reports leg cramps from the medication. Patient Care Team:  Charline Narayanan NP as PCP - General  Objective:     Visit Vitals  Ht 5' 1\" (1.549 m)   Wt 116 lb 9.6 oz (52.9 kg)   BMI 22.03 kg/m²    Body mass index is 22.03 kg/m². Last Weight Metrics:  Weight Loss Metrics 6/25/2019 6/18/2019 4/24/2019 3/6/2019 2/27/2019 2/21/2019 2/13/2019   Today's Wt 116 lb 9.6 oz 115 lb 8 oz 116 lb 6.4 oz 115 lb 115 lb 114 lb 8 oz 114 lb   BMI 22.03 kg/m2 21.82 kg/m2 21.99 kg/m2 21.73 kg/m2 21.73 kg/m2 21.63 kg/m2 21.54 kg/m2     No physical exam was performed today per Medicare Wellness Guidelines.    Health Maintenance:   Daily Aspirin: yes   Immunizations: stated as up to date, no records available  Health Maintenance reviewed   Health Maintenance Due   Topic Date Due    GLAUCOMA SCREENING Q2Y  01/23/2019    MEDICARE YEARLY EXAM  06/25/2019      Functional Assessment:   ALCOHOL SCREENING:   How often do you have a drink containing alcohol: Never  How many drinks do you have on a typical day when you are drinking: None  How often do you have 6 or more drinks on one occasion: Never  How often during the last year have you found that you were not able to stop drinking once you had started: Never  How often during the last year have you failed to do what was normally expected from you because of drinking: Never  How often during the last year have you needed a first drink in the morning to get yourself going after a heavy drinking session: Never  How often during the last year have you had a feeling of guilt or remorse after drinking: Never  How often during the last year have you been unable to remember what happened the night before because you had been drinking: Never  Have you or someone else been injured as a result of your drinking: Never  Have you or someone else been injured as a result of your drinking: Never  Has a relative or friend, or a doctor or other health worker been concerned about your drinking or suggested you cut down: Never  AUDIT Score: 0     ADL FUNCTIONALITY  ADL Assessment 6/25/2019   Feeding yourself No Help Needed   Getting from bed to chair No Help Needed   Getting dressed No Help Needed   Bathing or showering No Help Needed   Walk across the room (includes cane/walker) Help Needed   Using the telphone No Help Needed   Taking your medications No Help Needed   Preparing meals No Help Needed   Managing money (expenses/bills) No Help Needed   Moderately strenuous housework (laundry) Help Needed   Shopping for personal items (toiletries/medicines) Help Needed   Shopping for groceries Help Needed   Driving Help Needed   Climbing a flight of stairs Help Needed   Getting to places beyond walking distances Help Needed     DEPRESSION SCREENING:   3 most recent PHQ Screens 6/25/2019   PHQ Not Done -   Little interest or pleasure in doing things Not at all   Feeling down, depressed, irritable, or hopeless Not at all   Total Score PHQ 2 0   Trouble falling or staying asleep, or sleeping too much Not at all   Feeling tired or having little energy More than half the days   Poor appetite, weight loss, or overeating Not at all   Feeling bad about yourself - or that you are a failure or have let yourself or your family down Not at all   Trouble concentrating on things such as school, work, reading, or watching TV Not at all   Moving or speaking so slowly that other people could have noticed; or the opposite being so fidgety that others notice Not at all   Thoughts of being better off dead, or hurting yourself in some way Not at all   PHQ 9 Score 2   How difficult have these problems made it for you to do your work, take care of your home and get along with others -      [address] Exam 6/25/2019 4/10/2018   What is the Year 1 1   What is the Season 1 1   What is the Date 1 1   What is the Day 1 1   What is the Month 1 1   Where are we State 1 1   Where are we Country 1 1   Where are we [address] [place] [address] or [facility] 1 1   Where are we Floor 1 1   Name three objects, then ask the patient to say them 3 3   Serial sevens Subtract 7 from 100 in increments 1 1   Ask for the three objects repeated above 1 1   Name a pencil 1 1   Name a watch 1 1   Have the patient repeat this phrase \"No ifs, ands, or buts\" 1 1   Three stage command: Take the paper in your right hand 1 1   Fold the paper in half 1 1   Put the paper on the floor 1 1   Read and obey the following: CLOSE YOUR EYES 1 1   Have the patient write a sentence 1 1   Have the patient copy a figure 1 1   Mini Mental Score 24 24     FALL RISK:   Fall Risk Assessment, last 12 mths 6/25/2019   Able to walk? Yes   Fall in past 12 months? No      ABUSE SCREEN:   Abuse Screening Questionnaire 6/25/2019   Do you ever feel afraid of your partner? N   Are you in a relationship with someone who physically or mentally threatens you? N   Is it safe for you to go home? Y      HEARING SCREEN:The patient needs further evaluation.   NUTRITION SCREEN: attempting to eat gluten free, difficult on the campus here at [facility] as she is at the kitchen's mercy of letting her know what is & is not gluten free. What are the patient's living arrangements - the patient lives alone. This patient resides in [place] [address] on the campus of [facility]. Does the patient use any ambulatory aids: yes If so, what type: Rollator   Does the patient exhibit a steady gait? yes    Is the patient self reliant?  (ie can do own laundry, meals, household chores)  yes    Does the patient handle his/her own medications? yes   Does the patient handle his/her own money? yes   Is the patients home safe (ie good lighting, handrails on stairs and bath, etc.)? yes   Did you notice or did patient express any hearing difficulties? yes   Did you notice or did patient express any vision difficulties? yes   Were distance and reading eye charts used? yes     Advance Care Planning & Durable Do Not Resuscitate :      Advance Care Planning 6/25/2019   Patient's Healthcare Decision Maker is: Named in scanned ACP document   Primary Decision Maker Name -   Primary Decision Maker Phone Number -   Primary Decision Maker Relationship to Patient -   Confirm Advance Directive Yes, on file   Does the patient have other document types Do Not Resuscitate      Date of ACP Conversation: 06/25/19  Location of Conversation: In Office Visit 06/25/19. Persons included in Conversation:  patient   Length of ACP Conversation in minutes:  16 minutes  Is the patient deemed incompetent to make his/her health decisions: no  Authorized Decision Maker: Healthcare Agent/Medical Power of  under Advance Directive   Conversation: The following was discussed with the Ms. Loki Wesly with time given to answer questions concerning advance care planning:  Understanding of medical condition    Understanding of CPR, goals and expected outcomes, benefits and burdens discussed.   Information on CPR success rates provided (e.g. for CPR in hospital, survival to d/c at two weeks is 22%, for chronically ill or elderly/frail survival is less than 3%); Individual asked to communicate understanding of information in his/her own words. Explored fears and concerns regarding CPR or possible outcomes   Provided ACP educational materials: Understanding Advance Directives by 2025 Maurice Ojeda (www. [employer].org)    Reviewed existing Advance Directive   Recommended communicating the plan and making copies for the healthcare agent, personal physician, and others as appropriate (e.g., health system)  Recommended review of completed ACP document annually or upon change in health status  Reviewed exisiting DDNR, no changes. jp,np    Referrals:      I requested to send her for memory evaluation but she declines. She states \"if I do not show up for an appt then I will go for testing\". Treatment Plan: The following medication/treatments have been discontinued by the provider today after performing detailed medication reconciliation with patient:   There are no discontinued medications. ICD-10-CM ICD-9-CM    1. Medicare annual wellness visit, subsequent Z00.00 V70.0 DO NOT RESUSCITATE      VA ANNUAL ALCOHOL SCREEN 15 MIN      VA  BENEFIT/RISK AREDS FOR MAC DEGENERATION      VA MEDICATION LIST DOCUMENTED IN MEDICAL RECORD      VA PRESENCE/ABSENCE URINARY INCONTINENCE ASSESSED      VA PT FALLS ASSESS DOC 0-1 FALLS W/OUT INJ PAST YR   2.  ACP (advance care planning) Z71.89 V65.49 DO NOT RESUSCITATE      VA ANNUAL ALCOHOL SCREEN 15 MIN      VA  BENEFIT/RISK AREDS FOR MAC DEGENERATION      VA MEDICATION LIST DOCUMENTED IN MEDICAL RECORD      VA PRESENCE/ABSENCE URINARY INCONTINENCE ASSESSED      VA PT FALLS ASSESS DOC 0-1 FALLS W/OUT INJ PAST YR   3. DNR (do not resuscitate) Z66 V49.86 DO NOT RESUSCITATE      VA ANNUAL ALCOHOL SCREEN 15 MIN      VA  BENEFIT/RISK AREDS FOR MAC DEGENERATION      VA MEDICATION LIST DOCUMENTED IN MEDICAL RECORD      VA PRESENCE/ABSENCE URINARY INCONTINENCE ASSESSED      VA PT FALLS ASSESS DOC 0-1 FALLS W/OUT INJ PAST YR   4. Celiac disease K90.0 579.0 [place] MEDICATION LIST DOCUMENTED IN MEDICAL RECORD   5. Essential hypertension I10 401.9 [place] MEDICATION LIST DOCUMENTED IN MEDICAL RECORD   6. Memory loss R41.3 780.93    7. Word finding difficulty R47.89 V40.1        Disclaimer: This is an [facility] Exam ([facility]). Patient verbalized understanding and agreement with the plan. A copy of the After Visit Summary with personalized health plan was given to the patient today. Greater than 30 minutes was spent with patient discussing advance directives, wellness initiatives, and preventative measures of their health. I have reviewed the patient's medical history in detail and updated the computerized patient record.        Charan Spain RN, MSN, [address] [facility],[address]   Acute Care/Adult Gerontology Nurse Practitioner   [facility]   [address], [address] [place] [address]  [phone] (office)   [phone] (cell)   [phone] (office fax)

## 2019-06-25 NOTE — PROGRESS NOTES
ADVISED PATIENT OF THE FOLLOWING HEALTH MAINTAINCE DUE  Health Maintenance Due   Topic Date Due    Shingrix Vaccine Age 49> (1 of 2) 08/10/1981    GLAUCOMA SCREENING Q2Y  01/23/2019      Chief Complaint   Patient presents with    Results     review lab results.  Diarrhea     Pt has loose stools often. 1. Have you been to the ER, urgent care clinic since your last visit? Hospitalized since your last visit? No    2. Have you seen or consulted any other health care providers outside of the [address] [place] [address] since your last visit? Include any DEXA scan, mammography  or colon screening. No    3. Do you have an Advance Directive on file? yes    4. Do you have a DNR on file? DNR    Patient is accompanied by self I have received verbal consent from Ankur Mccarthy to discuss any/all medical information while they are present in the room. Advance Care Planning 6/25/2019   Patient's Healthcare Decision Maker is: Named in scanned ACP document   Primary Decision Maker Name -   Primary Decision Maker Phone Number -   Primary Decision Maker Relationship to Patient -   Confirm Advance Directive Yes, on file   Does the patient have other document types Do Not Resuscitate         [facility] [address] [place] [address]  [address] [address]  Phone: [phone] Fax: [phone]        Patient reminded during visit to bring all medication bottles, OTC medications to all appointments.

## 2019-07-24 DIAGNOSIS — M19.90 OSTEOARTHRITIS, UNSPECIFIED OSTEOARTHRITIS TYPE, UNSPECIFIED SITE: ICD-10-CM

## 2019-07-24 RX ORDER — CHROMIUM PICOLINATE 200 MCG
1 TABLET ORAL DAILY
Qty: 90 CAP | Refills: 2 | Status: SHIPPED | OUTPATIENT
Start: 2019-07-24 | End: 2019-09-25 | Stop reason: SDUPTHER

## 2019-09-23 ENCOUNTER — CLINICAL SUPPORT (OUTPATIENT)
Dept: GERIATRIC MEDICINE | Age: 84
End: 2019-09-23

## 2019-09-23 DIAGNOSIS — E78.5 HYPERLIPIDEMIA, UNSPECIFIED HYPERLIPIDEMIA TYPE: ICD-10-CM

## 2019-09-23 DIAGNOSIS — R73.03 PREDIABETES: ICD-10-CM

## 2019-09-23 DIAGNOSIS — E78.2 MIXED HYPERLIPIDEMIA: ICD-10-CM

## 2019-09-23 DIAGNOSIS — K90.0 CELIAC DISEASE: Chronic | ICD-10-CM

## 2019-09-23 DIAGNOSIS — N32.81 OAB (OVERACTIVE BLADDER): ICD-10-CM

## 2019-09-23 DIAGNOSIS — I10 ESSENTIAL HYPERTENSION: Primary | Chronic | ICD-10-CM

## 2019-09-23 DIAGNOSIS — E55.9 VITAMIN D DEFICIENCY: ICD-10-CM

## 2019-09-23 NOTE — PROGRESS NOTES
Chief Complaint   Patient presents with   [facility]     Patient here for lab draw. Neysa Cheadle presents for lab draw ordered by Pinky Ruiz RN MSN [facility]-AG-NP    The following labs were drawn and sent to lab by Guille Sparks LPN:    CBC, CMP, JMM3U and Lipid panel, Vit D, Vit b12 folate, Magnesium,     The following tubes were sent:    Lavender  ( 2) and Tiger (2)    Patient tolerated procedure well, blood obtained via venipuncture to left antecubital     Urine collected for UA and Micro.

## 2019-09-24 LAB
25(OH)D3+25(OH)D2 SERPL-MCNC: 36.8 NG/ML (ref 30–100)
ALBUMIN SERPL-MCNC: 4 G/DL (ref 3.5–4.7)
ALBUMIN/CREAT UR: 4.7 MG/G CREAT (ref 0–30)
ALBUMIN/GLOB SERPL: 1.7 {RATIO} (ref 1.2–2.2)
ALP SERPL-CCNC: 77 IU/L (ref 39–117)
ALT SERPL-CCNC: 20 IU/L (ref 0–32)
APPEARANCE UR: CLEAR
AST SERPL-CCNC: 36 IU/L (ref 0–40)
BACTERIA #/AREA URNS HPF: NORMAL /[HPF]
BASOPHILS # BLD AUTO: 0 X10E3/UL (ref 0–0.2)
BASOPHILS NFR BLD AUTO: 1 %
BILIRUB SERPL-MCNC: 0.5 MG/DL (ref 0–1.2)
BILIRUB UR QL STRIP: NEGATIVE
BUN SERPL-MCNC: 19 MG/DL (ref 8–27)
BUN/CREAT SERPL: 24 (ref 12–28)
CALCIUM SERPL-MCNC: 10.1 MG/DL (ref 8.7–10.3)
CASTS URNS QL MICRO: NORMAL /LPF
CHLORIDE SERPL-SCNC: 102 MMOL/L (ref 96–106)
CHOLEST SERPL-MCNC: 202 MG/DL (ref 100–199)
CO2 SERPL-SCNC: 23 MMOL/L (ref 20–29)
COLOR UR: YELLOW
CREAT SERPL-MCNC: 0.79 MG/DL (ref 0.57–1)
CREAT UR-MCNC: 65.4 MG/DL
EOSINOPHIL # BLD AUTO: 0.1 X10E3/UL (ref 0–0.4)
EOSINOPHIL NFR BLD AUTO: 2 %
EPI CELLS #/AREA URNS HPF: NORMAL /HPF (ref 0–10)
ERYTHROCYTE [DISTWIDTH] IN BLOOD BY AUTOMATED COUNT: 13 % (ref 12.3–15.4)
EST. AVERAGE GLUCOSE BLD GHB EST-MCNC: 123 MG/DL
FOLATE SERPL-MCNC: >20 NG/ML
FT4I SERPL CALC-MCNC: 1.7 (ref 1.2–4.9)
GLOBULIN SER CALC-MCNC: 2.3 G/DL (ref 1.5–4.5)
GLUCOSE SERPL-MCNC: 89 MG/DL (ref 65–99)
GLUCOSE UR QL: NEGATIVE
HBA1C MFR BLD: 5.9 % (ref 4.8–5.6)
HCT VFR BLD AUTO: 39.4 % (ref 34–46.6)
HDLC SERPL-MCNC: 77 MG/DL
HGB BLD-MCNC: 13.5 G/DL (ref 11.1–15.9)
HGB UR QL STRIP: NEGATIVE
IMM GRANULOCYTES # BLD AUTO: 0 X10E3/UL (ref 0–0.1)
IMM GRANULOCYTES NFR BLD AUTO: 0 %
KETONES UR QL STRIP: NEGATIVE
LDLC SERPL CALC-MCNC: 107 MG/DL (ref 0–99)
LEUKOCYTE ESTERASE UR QL STRIP: NEGATIVE
LYMPHOCYTES # BLD AUTO: 0.9 X10E3/UL (ref 0.7–3.1)
LYMPHOCYTES NFR BLD AUTO: 14 %
MAGNESIUM SERPL-MCNC: 2.4 MG/DL (ref 1.6–2.3)
MCH RBC QN AUTO: 30.3 PG (ref 26.6–33)
MCHC RBC AUTO-ENTMCNC: 34.3 G/DL (ref 31.5–35.7)
MCV RBC AUTO: 89 FL (ref 79–97)
MICRO URNS: NORMAL
MICRO URNS: NORMAL
MICROALBUMIN UR-MCNC: 3.1 UG/ML
MONOCYTES # BLD AUTO: 0.6 X10E3/UL (ref 0.1–0.9)
MONOCYTES NFR BLD AUTO: 9 %
MUCOUS THREADS URNS QL MICRO: PRESENT
NEUTROPHILS # BLD AUTO: 4.7 X10E3/UL (ref 1.4–7)
NEUTROPHILS NFR BLD AUTO: 74 %
NITRITE UR QL STRIP: NEGATIVE
PH UR STRIP: 6.5 [PH] (ref 5–7.5)
PLATELET # BLD AUTO: 199 X10E3/UL (ref 150–450)
POTASSIUM SERPL-SCNC: 4.8 MMOL/L (ref 3.5–5.2)
PROT SERPL-MCNC: 6.3 G/DL (ref 6–8.5)
PROT UR QL STRIP: NEGATIVE
RBC # BLD AUTO: 4.45 X10E6/UL (ref 3.77–5.28)
RBC #/AREA URNS HPF: NORMAL /HPF (ref 0–2)
SODIUM SERPL-SCNC: 143 MMOL/L (ref 134–144)
SP GR UR: 1.02 (ref 1–1.03)
T3RU NFR SERPL: 29 % (ref 24–39)
T4 SERPL-MCNC: 5.8 UG/DL (ref 4.5–12)
TRIGL SERPL-MCNC: 92 MG/DL (ref 0–149)
TSH SERPL DL<=0.005 MIU/L-ACNC: 1.88 UIU/ML (ref 0.45–4.5)
URINALYSIS REFLEX, 377202: NORMAL
UROBILINOGEN UR STRIP-MCNC: 0.2 MG/DL (ref 0.2–1)
VIT B12 SERPL-MCNC: 336 PG/ML (ref 232–1245)
VLDLC SERPL CALC-MCNC: 18 MG/DL (ref 5–40)
WBC # BLD AUTO: 6.3 X10E3/UL (ref 3.4–10.8)
WBC #/AREA URNS HPF: NORMAL /HPF (ref 0–5)

## 2019-09-25 DIAGNOSIS — E78.2 MIXED HYPERLIPIDEMIA: Primary | ICD-10-CM

## 2019-09-25 RX ORDER — SIMVASTATIN 5 MG/1
5 TABLET, FILM COATED ORAL
Qty: 90 TAB | Refills: 1 | Status: SHIPPED | OUTPATIENT
Start: 2019-09-25 | End: 2020-03-25 | Stop reason: SDUPTHER

## 2019-09-25 RX ORDER — CALCIUM CARBONATE/VITAMIN D3 600 MG-10
TABLET ORAL
Refills: 2 | COMMUNITY
Start: 2019-07-24

## 2019-09-25 NOTE — PROGRESS NOTES
Advise patient in writing- she does not need to come in for a visit unless she wants to talk about the recommendations. CBC- stable. CMP- stable. HGA1C- stable. Lipid Panel- elevated, I would restart the Simvastatin at 5 mg q hs   Mag- mildly elevated. Micro albumin urine- excellent filtration. Urine- normal.   Thyroid- stable. Vitamin d- stable. Vitamin b12 & folate- stable. Next labs in 4 weeks for lipid monitoring.

## 2019-09-26 ENCOUNTER — OFFICE VISIT (OUTPATIENT)
Dept: GERIATRIC MEDICINE | Age: 84
End: 2019-09-26

## 2019-09-26 VITALS
HEART RATE: 70 BPM | WEIGHT: 116 LBS | OXYGEN SATURATION: 97 % | DIASTOLIC BLOOD PRESSURE: 68 MMHG | BODY MASS INDEX: 21.9 KG/M2 | HEIGHT: 61 IN | SYSTOLIC BLOOD PRESSURE: 140 MMHG | RESPIRATION RATE: 16 BRPM

## 2019-09-26 DIAGNOSIS — H90.71 MIXED CONDUCTIVE AND SENSORINEURAL HEARING LOSS OF RIGHT EAR WITH UNRESTRICTED HEARING OF LEFT EAR: ICD-10-CM

## 2019-09-26 DIAGNOSIS — R15.2 INCONTINENCE OF FECES WITH FECAL URGENCY: ICD-10-CM

## 2019-09-26 DIAGNOSIS — R41.3 MEMORY LOSS: ICD-10-CM

## 2019-09-26 DIAGNOSIS — N32.81 URGENCY-FREQUENCY SYNDROME: ICD-10-CM

## 2019-09-26 DIAGNOSIS — R15.9 INCONTINENCE OF FECES WITH FECAL URGENCY: ICD-10-CM

## 2019-09-26 DIAGNOSIS — E78.2 MIXED HYPERLIPIDEMIA: ICD-10-CM

## 2019-09-26 DIAGNOSIS — H61.21 IMPACTED CERUMEN OF RIGHT EAR: ICD-10-CM

## 2019-09-26 DIAGNOSIS — K59.1 FUNCTIONAL DIARRHEA: ICD-10-CM

## 2019-09-26 DIAGNOSIS — I10 ESSENTIAL HYPERTENSION: Primary | ICD-10-CM

## 2019-09-26 DIAGNOSIS — Z79.899 MEDICATION MANAGEMENT: ICD-10-CM

## 2019-09-26 DIAGNOSIS — K90.0 CELIAC DISEASE: ICD-10-CM

## 2019-09-26 NOTE — PROGRESS NOTES
Reason for Visit   Grant Tomlinson is a 80 y.o. female patient who presents today for :  Chief Complaint   Patient presents with    Results     Patient here to review lab results. Treatment Plan / Follow Up: Follow-up and Dispositions    · Return in about 3 months (around 12/26/2019) for for routine lab draw to monitor chronic disease. Labs look great except her Lipid panel is now elevated again. I have restated her on a statin 5 mg at night. Discussed her ACP documents and Plan:   If [employer] [facility] ([phone]) does is unable to  the body in 4 hours then she has elected [address] [place] [address]. has a contract to hold the body for the VSAP to assess for acceptance. Also if the body is rejected at the [address] [facility], then the body will be managed by [address] [place] [address]. ([phone])     Diagnosis:        ICD-10-CM ICD-9-CM    1. Essential hypertension I10 401.9    2. Mixed hyperlipidemia E78.2 272.2    3. Incontinence of feces with fecal urgency R15.9 787.63     R15.2     4. 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 Celiac disease K90.0 579.0    6. 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 Memory loss R41.3 780.93    8. Impacted cerumen of right ear H61.21 380.4 REMOVAL IMPACTED CERUMEN IRRIGATION/LVG UNILAT   9. Medication management Z79.899 V58.69    10.  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 389.21         Orders Placed This Encounter    REMOVAL IMPACTED CERUMEN IRRIGATION/LVG UNILAT      Objective:     Vitals:    09/26/19 0934   BP: 140/68   Pulse: 70   Resp: 16   SpO2: 97%   Weight: 116 lb (52.6 kg)   Height: 5' 1\" (1.549 m)     Wt Readings from Last 3 Encounters:   09/26/19 116 lb (52.6 kg)   06/25/19 116 lb 9.6 oz (52.9 kg)   06/18/19 115 lb 8 oz (52.4 kg)     BP Readings from Last 3 Encounters:   09/26/19 140/68   01/08/19 140/78   10/26/18 135/58     Review of Systems   Constitutional: Negative for activity change, appetite change, fatigue, fever and unexpected weight change. HENT: Negative for congestion, dental problem, hearing loss, postnasal drip, rhinorrhea, sinus pressure, sinus pain, sneezing, sore throat, trouble swallowing and voice change. Eyes: Negative for discharge, redness and visual disturbance. Respiratory: Negative for apnea, cough, chest tightness, shortness of breath and wheezing. Cardiovascular: Negative for chest pain, palpitations and leg swelling. Gastrointestinal: Positive for abdominal distention, blood in stool and rectal pain. Negative for abdominal pain, constipation, diarrhea, nausea and vomiting. Endocrine: Negative for polydipsia, polyphagia and polyuria. Genitourinary: Positive for frequency and urgency. Negative for flank pain. Musculoskeletal: Positive for arthralgias, gait problem, joint swelling and myalgias. Negative for back pain and neck pain. Skin: Negative for color change, pallor, rash and wound. Allergic/Immunologic: Negative for environmental allergies and food allergies. Neurological: Negative for dizziness, tremors, weakness, light-headedness, numbness and headaches. Hematological: Negative for adenopathy. Psychiatric/Behavioral: Positive for confusion. Negative for agitation and sleep disturbance. The patient is not nervous/anxious. Physical Exam   Constitutional: Vital signs are normal. She appears malnourished and dehydrated. She appears to not be writhing in pain. She appears unhealthy. She does not have a sickly appearance. No distress. Frail, fragile, elderly  female, alert and oriented x 2. Trouble finding words and staying on task with the appointment today. Slightly more fatigued today. HENT:   Head: Normocephalic and atraumatic. Right Ear: Tympanic membrane, external ear and ear canal normal. Decreased hearing is noted.    Left Ear: Tympanic membrane, external ear and ear canal normal. Decreased hearing is noted. Nose: No mucosal edema, rhinorrhea or sinus tenderness. Right sinus exhibits no frontal sinus tenderness. Left sinus exhibits no frontal sinus tenderness. Mouth/Throat: Uvula is midline. Mucous membranes are not pale, not dry and not cyanotic. No oral lesions. No uvula swelling. No posterior oropharyngeal edema or posterior oropharyngeal erythema. Eyes: Pupils are equal, round, and reactive to light. Conjunctivae, EOM and lids are normal. Lids are everted and swept, no foreign bodies found. Neck: Trachea normal, normal range of motion and full passive range of motion without pain. Neck supple. No hepatojugular reflux and no JVD present. Carotid bruit is not present. No thyromegaly present. Cardiovascular: Normal rate, regular rhythm, S1 normal, S2 normal, normal heart sounds, intact distal pulses and normal pulses. Occasional extrasystoles are present. Exam reveals no gallop and no friction rub. No murmur heard. No lower extremity edema present today. Pulmonary/Chest: Effort normal and breath sounds normal.   Abdominal: Soft. Normal appearance. She exhibits distension. She exhibits no fluid wave and no mass. Bowel sounds are hyperactive. There is no hepatosplenomegaly. There is no tenderness. There is no rebound and no CVA tenderness. Musculoskeletal: Normal range of motion. Chronic muscular weakness generalized, chronic joint disfiguration due to arthritis. Lymphadenopathy:        Head (right side): No submandibular adenopathy present. Head (left side): No submandibular and no tonsillar adenopathy present. She has no cervical adenopathy. She has no axillary adenopathy. Neurological: She is alert. She has normal sensation, normal reflexes and intact cranial nerves. She is disoriented. She displays weakness and atrophy. She exhibits abnormal muscle tone. Gait normal. Coordination and gait normal.   Skin: Skin is warm and dry.  No bruising, no ecchymosis and no rash noted. She is not diaphoretic. No cyanosis. There is pallor. Nails show no clubbing. Psychiatric: Mood, memory, affect and judgment normal.   Baseline mood and affect unchanged from normal. Extremely tired in appearance. Nursing note and vitals reviewed. Subjective: Allergies   Allergen Reactions    Latex Rash    Orabase (Benzocaine) [Benzocaine] Anaphylaxis    Adhesive Rash    Codeine Nausea and Vomiting    Darvocet A500 [Propoxyphene N-Acetaminophen] Unknown (comments)    Hydrocodone Unknown (comments)    Myrbetriq [Mirabegron] Myalgia     Reports leg cramps from the medication. Prior to Admission medications    Medication Sig Start Date End Date Taking? Authorizing Provider   CALCIUM 600 + D tablet TK 1 T PO QD 7/24/19  Yes Provider, Historical   simvastatin (ZOCOR) 5 mg tablet Take 1 Tab by mouth nightly. 9/25/19  Yes Luanne Murray NP   hydroCHLOROthiazide (HYDRODIURIL) 12.5 mg tablet Take 1 Tab by mouth daily. 6/19/19  Yes Luanne Murray NP   atenolol (TENORMIN) 100 mg tablet Take 1 Tab by mouth daily. 3/8/19  Yes Luanne Murray NP   aspirin delayed-release 81 mg tablet Take 1 Tab by mouth daily. 3/20/18  Yes Luanne Murray NP   loperamide (IMODIUM) 2 mg capsule Take 2 mg by mouth every other day. Yes Provider, Historical   carbamide peroxide (DEBROX) 6.5 % otic solution Administer 5 Drops in right ear two (2) times a week. 1/6/17  Yes Burnell Peabody, NP   acetaminophen (TYLENOL) 325 mg tablet Take  by mouth every four (4) hours as needed for Pain. Yes Provider, Historical   peg 400-propylene glycol (SYSTANE) 0.4-0.3 % drop Administer 1 Drop to both eyes four (4) times daily. Yes Provider, Historical   amoxicillin (AMOXIL) 500 mg capsule Take 500 mg by mouth. Take 4 caps one hour prior to dental appointments.   Indications: prophylactic   Yes Provider, Historical     Past Medical History:   Diagnosis Date    Allergic rhinitis     Arthritis     Benign hematuria     negative w/u by Dr. Lelo Walters    Hematuria     Hypercholesterolemia     Hypertension     Osteoarthritis     Osteopenia     Overactive bladder     Rosacea     Rosacea conjunctivitis(372.31)     Shingles     Stress incontinence      Past Surgical History:   Procedure Laterality Date    ENDOSCOPY, COLON, DIAGNOSTIC  7/2005    ([address])    HX CATARACT REMOVAL  7/2004    bilateral    HX ORTHOPAEDIC      toe surgery for bone spurs    HX IRENE AND BSO  1980    TOTAL HIP ARTHROPLASTY  2000    left    TOTAL HIP ARTHROPLASTY  2001    right      Social History     Tobacco Use    Smoking status: Never Smoker    Smokeless tobacco: Never Used   Substance Use Topics    Alcohol use: No     Alcohol/week: 0.0 standard drinks    Drug use: No      Family History   Problem Relation Age of Onset    Stroke Mother         x2    Heart Attack Mother 61    Parkinsonism Father     Breast Cancer Sister     Hypertension Sister     Arthritis-osteo Sister     Heart defect Sister         MVP    Stroke Sister    Kathleen Clements Sister     Hypertension Sister     Diabetes Sister     Breast Cancer Sister     Other Sister         polycythemia vera    Allergic Rhinitis Son     Hypertension Son     Headache Son     Allergic Rhinitis Daughter     Allergic Rhinitis Son      Functional Assessment:   ADL FUNCTIONALITY:  ADL Assessment 6/25/2019   Feeding yourself No Help Needed   Getting from bed to chair No Help Needed   Getting dressed No Help Needed   Bathing or showering No Help Needed   Walk across the room (includes cane/walker) Help Needed   Using the telphone No Help Needed   Taking your medications No Help Needed   Preparing meals No Help Needed   Managing money (expenses/bills) No Help Needed   Moderately strenuous housework (laundry) Help Needed   Shopping for personal items (toiletries/medicines) Help Needed   Shopping for groceries Help Needed   Driving Help Needed   Climbing a flight of stairs Help Needed   Getting to places beyond walking distances Help Needed     DEPRESSION SCREENING:   3 most recent PHQ Screens 9/26/2019   PHQ Not Done -   Little interest or pleasure in doing things Not at all   Feeling down, depressed, irritable, or hopeless Not at all   Total Score PHQ 2 0   Trouble falling or staying asleep, or sleeping too much -   Feeling tired or having little energy -   Poor appetite, weight loss, or overeating -   Feeling bad about yourself - or that you are a failure or have let yourself or your family down -   Trouble concentrating on things such as school, work, reading, or watching TV -   Moving or speaking so slowly that other people could have noticed; or the opposite being so fidgety that others notice -   Thoughts of being better off dead, or hurting yourself in some way -   PHQ 9 Score -   How difficult have these problems made it for you to do your work, take care of your home and get along with others -      [address] Exam 6/25/2019 4/10/2018   What is the Year 1 1   What is the Season 1 1   What is the Date 1 1   What is the Day 1 1   What is the Month 1 1   Where are we State 1 1   Where are we Country 1 1   Where are we [address] [place] [address] or [facility] 1 1   Where are we Floor 1 1   Name three objects, then ask the patient to say them 3 3   Serial sevens Subtract 7 from 100 in increments 1 1   Ask for the three objects repeated above 1 1   Name a pencil 1 1   Name a watch 1 1   Have the patient repeat this phrase \"No ifs, ands, or buts\" 1 1   Three stage command: Take the paper in your right hand 1 1   Fold the paper in half 1 1   Put the paper on the floor 1 1   Read and obey the following: CLOSE YOUR EYES 1 1   Have the patient write a sentence 1 1   Have the patient copy a figure 1 1   Mini Mental Score 24 24     FALL RISK:   Fall Risk Assessment, last 12 mths 9/26/2019   Able to walk? Yes   Fall in past 12 months?  No      ABUSE SCREEN:   Abuse Screening Questionnaire 6/25/2019   Do you ever feel afraid of your partner? N   Are you in a relationship with someone who physically or mentally threatens you? N   Is it safe for you to go home? Y      Advance Care Planning 6/25/2019   Patient's Healthcare Decision Maker is: Named in scanned ACP document   Primary Decision Maker Name -   Primary Decision Maker Phone Number -   Primary Decision Maker Relationship to Patient -   Confirm Advance Directive Yes, on file   Does the patient have other document types Do Not Resuscitate     CHANGES IN TREATMENT:    The following treatment modalities have been discontinued by the provider today:   There are no discontinued medications. MOST RECENT LABORATORY RESULTS:      Clinical Support on 09/23/2019   Component Date Value Ref Range Status    WBC 09/23/2019 6.3  3.4 - 10.8 x10E3/uL Final    RBC 09/23/2019 4.45  3.77 - 5.28 x10E6/uL Final    HGB 09/23/2019 13.5  11.1 - 15.9 g/dL Final    HCT 09/23/2019 39.4  34.0 - 46.6 % Final    MCV 09/23/2019 89  79 - 97 fL Final    MCH 09/23/2019 30.3  26.6 - 33.0 pg Final    MCHC 09/23/2019 34.3  31.5 - 35.7 g/dL Final    RDW 09/23/2019 13.0  12.3 - 15.4 % Final    PLATELET 54/30/4028 010  150 - 450 x10E3/uL Final    NEUTROPHILS 09/23/2019 74  Not Estab. % Final    Lymphocytes 09/23/2019 14  Not Estab. % Final    MONOCYTES 09/23/2019 9  Not Estab. % Final    EOSINOPHILS 09/23/2019 2  Not Estab. % Final    BASOPHILS 09/23/2019 1  Not Estab. % Final    ABS. NEUTROPHILS 09/23/2019 4.7  1.4 - 7.0 x10E3/uL Final    Abs Lymphocytes 09/23/2019 0.9  0.7 - 3.1 x10E3/uL Final    ABS. MONOCYTES 09/23/2019 0.6  0.1 - 0.9 x10E3/uL Final    ABS. EOSINOPHILS 09/23/2019 0.1  0.0 - 0.4 x10E3/uL Final    ABS. BASOPHILS 09/23/2019 0.0  0.0 - 0.2 x10E3/uL Final    IMMATURE GRANULOCYTES 09/23/2019 0  Not Estab. % Final    ABS. IMM. GRANS. 09/23/2019 0.0  0.0 - 0.1 x10E3/uL Final    Glucose 09/23/2019 89  65 - 99 mg/dL Final    Comment: Specimen received in contact with cells.  No visible hemolysis  present. However GLUC may be decreased and K increased. Clinical  correlation indicated.  BUN 09/23/2019 19  8 - 27 mg/dL Final    Creatinine 09/23/2019 0.79  0.57 - 1.00 mg/dL Final    GFR est non-AA 09/23/2019 67  >59 mL/min/1.73 Final    GFR est AA 09/23/2019 77  >59 mL/min/1.73 Final    BUN/Creatinine ratio 09/23/2019 24  12 - 28 Final    Sodium 09/23/2019 143  134 - 144 mmol/L Final    Potassium 09/23/2019 4.8  3.5 - 5.2 mmol/L Final    Comment: Specimen received in contact with cells. No visible hemolysis  present. However GLUC may be decreased and K increased. Clinical  correlation indicated.  Chloride 09/23/2019 102  96 - 106 mmol/L Final    CO2 09/23/2019 23  20 - 29 mmol/L Final    Calcium 09/23/2019 10.1  8.7 - 10.3 mg/dL Final    Protein, total 09/23/2019 6.3  6.0 - 8.5 g/dL Final    Albumin 09/23/2019 4.0  3.5 - 4.7 g/dL Final    GLOBULIN, TOTAL 09/23/2019 2.3  1.5 - 4.5 g/dL Final    A-G Ratio 09/23/2019 1.7  1.2 - 2.2 Final    Bilirubin, total 09/23/2019 0.5  0.0 - 1.2 mg/dL Final    Alk.  phosphatase 09/23/2019 77  39 - 117 IU/L Final    AST (SGOT) 09/23/2019 36  0 - 40 IU/L Final    ALT (SGPT) 09/23/2019 20  0 - 32 IU/L Final    Hemoglobin A1c 09/23/2019 5.9* 4.8 - 5.6 % Final    Comment:          Prediabetes: 5.7 - 6.4           Diabetes: >6.4           Glycemic control for adults with diabetes: <7.0      Estimated average glucose 09/23/2019 123  mg/dL Final    Cholesterol, total 09/23/2019 202* 100 - 199 mg/dL Final    Triglyceride 09/23/2019 92  0 - 149 mg/dL Final    HDL Cholesterol 09/23/2019 77  >39 mg/dL Final    VLDL, calculated 09/23/2019 18  5 - 40 mg/dL Final    LDL, calculated 09/23/2019 107* 0 - 99 mg/dL Final    Magnesium 09/23/2019 2.4* 1.6 - 2.3 mg/dL Final    Creatinine, urine 09/23/2019 65.4  Not Estab. mg/dL Final    Microalbumin, urine 09/23/2019 3.1  Not Estab. ug/mL Final    Microalb/Creat ratio (ug/mg creat.) 09/23/2019 4.7  0.0 - 30.0 mg/g creat Final    Comment:                      Normal:                0.0 -  30.0                       Albuminuria:          31.0 - 300.0                       Clinical albuminuria:       >300.0      Specific Gravity 09/23/2019 1.017  1.005 - 1.030 Final    pH (UA) 09/23/2019 6.5  5.0 - 7.5 Final    Color 09/23/2019 Yellow  Yellow Final    Appearance 09/23/2019 Clear  Clear Final    Leukocyte Esterase 09/23/2019 Negative  Negative Final    Protein 09/23/2019 Negative  Negative/Trace Final    Glucose 09/23/2019 Negative  Negative Final    Ketone 09/23/2019 Negative  Negative Final    Blood 09/23/2019 Negative  Negative Final    Bilirubin 09/23/2019 Negative  Negative Final    Urobilinogen 09/23/2019 0.2  0.2 - 1.0 mg/dL Final    Nitrites 09/23/2019 Negative  Negative Final    Microscopic Examination 09/23/2019 Comment   Final    Microscopic follows if indicated.  Microscopic exam 09/23/2019 See additional order   Final    Microscopic was indicated and was performed.  URINALYSIS REFLEX 09/23/2019 Comment   Final    This specimen will not reflex to a Urine Culture.  Vitamin B12 09/23/2019 336  232 - 1,245 pg/mL Final    Folate 09/23/2019 >20.0  >3.0 ng/mL Final    Comment: A serum folate concentration of less than 3.1 ng/mL is  considered to represent clinical deficiency.  VITAMIN D, 25-HYDROXY 09/23/2019 36.8  30.0 - 100.0 ng/mL Final    Comment: Vitamin D deficiency has been defined by the [address] practice guideline as a  level of serum 25-OH vitamin D less than 20 ng/mL (1,2). The Endocrine Society went on to further define vitamin D  insufficiency as a level between 21 and 29 ng/mL (2). 1. IOM ([place] of [employer]). 2010. Dietary reference     intakes for calcium and D. [facility]: The     [employer].   2. Memo LEE, Samuel REYES, Andrei MOSQUERA, et al.     Evaluation, treatment, and prevention of vitamin D     deficiency: an Endocrine Society clinical practice     guideline. JCEM. 2011 Jul; 96(7):1911-30.  TSH 09/23/2019 1.880  0.450 - 4.500 uIU/mL Final    T4, Total 09/23/2019 5.8  4.5 - 12.0 ug/dL Final    T3 Uptake 09/23/2019 29  24 - 39 % Final    Free Thyroxine Index (FTI) 09/23/2019 1.7  1.2 - 4.9 Final    WBC 09/23/2019 0-5  0 - 5 /hpf Final    RBC 09/23/2019 0-2  0 - 2 /hpf Final    Epithelial cells 09/23/2019 0-10  0 - 10 /hpf Final    Casts 09/23/2019 None seen  None seen /lpf Final    Mucus 09/23/2019 Present  Not Estab. Final    Bacteria 09/23/2019 None seen  None seen/Few Final     Results for orders placed or performed in visit on 09/23/19   CBC WITH AUTOMATED DIFF   Result Value Ref Range    WBC 6.3 3.4 - 10.8 x10E3/uL    RBC 4.45 3.77 - 5.28 x10E6/uL    HGB 13.5 11.1 - 15.9 g/dL    HCT 39.4 34.0 - 46.6 %    MCV 89 79 - 97 fL    MCH 30.3 26.6 - 33.0 pg    MCHC 34.3 31.5 - 35.7 g/dL    RDW 13.0 12.3 - 15.4 %    PLATELET 985 717 - 041 x10E3/uL    NEUTROPHILS 74 Not Estab. %    Lymphocytes 14 Not Estab. %    MONOCYTES 9 Not Estab. %    EOSINOPHILS 2 Not Estab. %    BASOPHILS 1 Not Estab. %    ABS. NEUTROPHILS 4.7 1.4 - 7.0 x10E3/uL    Abs Lymphocytes 0.9 0.7 - 3.1 x10E3/uL    ABS. MONOCYTES 0.6 0.1 - 0.9 x10E3/uL    ABS. EOSINOPHILS 0.1 0.0 - 0.4 x10E3/uL    ABS. BASOPHILS 0.0 0.0 - 0.2 x10E3/uL    IMMATURE GRANULOCYTES 0 Not Estab. %    ABS. IMM.  GRANS. 0.0 0.0 - 0.1 C75V1/SM   METABOLIC PANEL, COMPREHENSIVE   Result Value Ref Range    Glucose 89 65 - 99 mg/dL    BUN 19 8 - 27 mg/dL    Creatinine 0.79 0.57 - 1.00 mg/dL    GFR est non-AA 67 >59 mL/min/1.73    GFR est AA 77 >59 mL/min/1.73    BUN/Creatinine ratio 24 12 - 28    Sodium 143 134 - 144 mmol/L    Potassium 4.8 3.5 - 5.2 mmol/L    Chloride 102 96 - 106 mmol/L    CO2 23 20 - 29 mmol/L    Calcium 10.1 8.7 - 10.3 mg/dL    Protein, total 6.3 6.0 - 8.5 g/dL    Albumin 4.0 3.5 - 4.7 g/dL    GLOBULIN, TOTAL 2.3 1.5 - 4.5 g/dL    A-G Ratio 1. 7 1.2 - 2.2    Bilirubin, total 0.5 0.0 - 1.2 mg/dL    Alk. phosphatase 77 39 - 117 IU/L    AST (SGOT) 36 0 - 40 IU/L    ALT (SGPT) 20 0 - 32 IU/L   HEMOGLOBIN A1C WITH EAG   Result Value Ref Range    Hemoglobin A1c 5.9 (H) 4.8 - 5.6 %    Estimated average glucose 123 mg/dL   LIPID PANEL   Result Value Ref Range    Cholesterol, total 202 (H) 100 - 199 mg/dL    Triglyceride 92 0 - 149 mg/dL    HDL Cholesterol 77 >39 mg/dL    VLDL, calculated 18 5 - 40 mg/dL    LDL, calculated 107 (H) 0 - 99 mg/dL   MAGNESIUM   Result Value Ref Range    Magnesium 2.4 (H) 1.6 - 2.3 mg/dL   MICROALBUMIN, UR, RAND W/ MICROALB/CREAT RATIO   Result Value Ref Range    Creatinine, urine 65.4 Not Estab. mg/dL    Microalbumin, urine 3.1 Not Estab. ug/mL    Microalb/Creat ratio (ug/mg creat.) 4.7 0.0 - 30.0 mg/g creat   UA WITH REFLEX MICRO AND CULTURE   Result Value Ref Range    Specific Gravity 1.017 1.005 - 1.030    pH (UA) 6.5 5.0 - 7.5    Color Yellow Yellow    Appearance Clear Clear    Leukocyte Esterase Negative Negative    Protein Negative Negative/Trace    Glucose Negative Negative    Ketone Negative Negative    Blood Negative Negative    Bilirubin Negative Negative    Urobilinogen 0.2 0.2 - 1.0 mg/dL    Nitrites Negative Negative    Microscopic Examination Comment     Microscopic exam See additional order     URINALYSIS REFLEX Comment    VITAMIN B12 & FOLATE   Result Value Ref Range    Vitamin B12 336 232 - 1,245 pg/mL    Folate >20.0 >3.0 ng/mL   VITAMIN D, 25 HYDROXY   Result Value Ref Range    VITAMIN D, 25-HYDROXY 36.8 30.0 - 100.0 ng/mL   THYROID PANEL W/TSH   Result Value Ref Range    TSH 1.880 0.450 - 4.500 uIU/mL    T4, Total 5.8 4.5 - 12.0 ug/dL    T3 Uptake 29 24 - 39 %    Free Thyroxine Index (FTI) 1.7 1.2 - 4.9   MICROSCOPIC EXAMINATION   Result Value Ref Range    WBC 0-5 0 - 5 /hpf    RBC 0-2 0 - 2 /hpf    Epithelial cells 0-10 0 - 10 /hpf    Casts None seen None seen /lpf    Mucus Present Not Estab.     Bacteria None seen None seen/Few      Disclaimer:   Ms. Charla Agrawal has been advised to call or return to our office if symptoms worsen/change/persist. We as a care team including the patient; discussed expected course/resolution/complications of diagnosis in detail today. Medication risks/benefits/costs/interactions/alternatives discussed Charlakaylen Agrawal was given an after visit summary which includes diagnoses, current medications, & vitals. Charla Agrawal expressed understanding with the diagnosis and plan.

## 2019-09-26 NOTE — PROGRESS NOTES
ADVISED PATIENT OF THE FOLLOWING HEALTH MAINTAINCE DUE  Health Maintenance Due   Topic Date Due    GLAUCOMA SCREENING Q2Y  01/23/2019    Influenza Age 5 to Adult  08/01/2019      Chief Complaint   Patient presents with    Results     Patient here to review lab results. 1. Have you been to the ER, urgent care clinic since your last visit? Hospitalized since your last visit? No    2. Have you seen or consulted any other health care providers outside of the [address] [place] [address] since your last visit? Include any DEXA scan, mammography  or colon screening. No    3. Do you have an Advance Directive on file? yes    4. Do you have a DNR on file? DNR    Patient is accompanied by self I have received verbal consent from David Jefferson to discuss any/all medical information while they are present in the room. Advance Care Planning 6/25/2019   Patient's Healthcare Decision Maker is: Named in scanned ACP document   Primary Decision Maker Name -   Primary Decision Maker Phone Number -   Primary Decision Maker Relationship to Patient -   Confirm Advance Directive Yes, on file   Does the patient have other document types Do Not Resuscitate         [address] [address]  Phone: [phone] Fax: [phone]        Patient reminded during visit to bring all medication bottles, OTC medications to all appointments. Patient seeing  for Glaucoma screening on October 24 th. We will request her records.

## 2019-12-12 DIAGNOSIS — I10 HYPERTENSION, UNSPECIFIED TYPE: ICD-10-CM

## 2019-12-12 RX ORDER — HYDROCHLOROTHIAZIDE 12.5 MG/1
12.5 TABLET ORAL DAILY
Qty: 90 TAB | Refills: 1 | Status: SHIPPED | OUTPATIENT
Start: 2019-12-12 | End: 2020-06-15 | Stop reason: SDUPTHER

## 2020-01-22 ENCOUNTER — OFFICE VISIT (OUTPATIENT)
Dept: GERIATRIC MEDICINE | Age: 85
End: 2020-01-22

## 2020-01-22 VITALS
OXYGEN SATURATION: 97 % | HEART RATE: 68 BPM | DIASTOLIC BLOOD PRESSURE: 64 MMHG | BODY MASS INDEX: 21.13 KG/M2 | SYSTOLIC BLOOD PRESSURE: 132 MMHG | RESPIRATION RATE: 18 BRPM | TEMPERATURE: 97.5 F | WEIGHT: 111.9 LBS | HEIGHT: 61 IN

## 2020-01-22 DIAGNOSIS — R35.0 FREQUENCY OF URINATION: ICD-10-CM

## 2020-01-22 DIAGNOSIS — R32 URINARY INCONTINENCE, UNSPECIFIED TYPE: ICD-10-CM

## 2020-01-22 DIAGNOSIS — K64.4 BLEEDING EXTERNAL HEMORRHOIDS: Primary | ICD-10-CM

## 2020-01-22 DIAGNOSIS — N81.10 VAGINAL PROLAPSE: ICD-10-CM

## 2020-01-22 RX ORDER — HYDROCORTISONE 25 MG/G
CREAM TOPICAL 3 TIMES DAILY
Qty: 30 G | Refills: 0 | Status: SHIPPED | OUTPATIENT
Start: 2020-01-22 | End: 2020-01-29

## 2020-01-22 NOTE — PROGRESS NOTES
ADVISED PATIENT OF THE FOLLOWING HEALTH MAINTAINCE DUE  There are no preventive care reminders to display for this patient. Chief Complaint   Patient presents with    Hemorrhoids     Patient having some bleeding that started on yesturday and today. She is using cream.     Bladder Pain     Patient reports when standing she has prolapse rectum and she has to urinate.  Rectal Prolapse     Patient reports having prolapse rectum. This started in November. and this is getting worse.  Nasal Congestion     Having dry nasal passages and some nasal bleeding       1. Have you been to the ER, urgent care clinic since your last visit? Hospitalized since your last visit? No    2. Have you seen or consulted any other health care providers outside of the [address] [place] [address] since your last visit? Include any DEXA scan, mammography  or colon screening. No    3. Do you have an Advance Directive on file? yes    4. Do you have a DNR on file? DNR    Patient is accompanied by self I have received verbal consent from Farzaneh Hauser to discuss any/all medical information while they are present in the room. Advance Care Planning 6/25/2019   Patient's Healthcare Decision Maker is: Named in scanned ACP document   Primary Decision Maker Name -   Primary Decision Maker Phone Number -   Primary Decision Maker Relationship to Patient -   Confirm Advance Directive Yes, on file   Does the patient have other document types Do Not Resuscitate         [facility] [address] [place] [id number]  Phone: [phone] Fax: [phone]        Patient reminded during visit to bring all medication bottles, OTC medications to all appointments.

## 2020-01-22 NOTE — PROGRESS NOTES
HISTORY OF PRESENT ILLNESS  Ankur Mccarthy is a 80 y.o. female. HPI  Patient comes in to day with complaints of frequency and ongoing incontinence symptoms. She states this has been going on over two months. When she stands \"Everything falls out\" in reference to her vagina. She says that she is able to tuck it back in. It is not causing pain, but she states its uncomfortable. Upon examination there is alarge buldge of tissue through the vaginal opening and is able to be placed back in. She also says she has smears of bright red blood on her toilet tissue. There is no active rectal or vaginal bleeding. She has external hemorrhoids that appear irritated. Recommend anusol cream. Also discussed treatment options for vaginal prolapse and recommended appointment with Uro-Gynecology Dr. Lilly Valentin as she prefers a female. Gave time to answer questions. Performed brief vaginal and rectal examination today in office. Review of Systems   Constitutional: Negative. HENT: Positive for hearing loss. Negative for congestion, ear discharge, ear pain, nosebleeds, sinus pain and tinnitus. Eyes: Negative. Respiratory: Negative. Negative for stridor. Cardiovascular: Negative. Gastrointestinal: Positive for blood in stool. Negative for abdominal pain, constipation, diarrhea, heartburn, nausea and vomiting. Specks of blood on toilet tissue     Genitourinary: Positive for frequency. Negative for dysuria, flank pain, hematuria and urgency. Musculoskeletal: Negative. Skin: Negative. Neurological: Negative. Psychiatric/Behavioral: Negative. Physical Exam  Vitals signs reviewed. Constitutional:       Appearance: Normal appearance. HENT:      Head: Normocephalic. Neck:      Musculoskeletal: Normal range of motion. Cardiovascular:      Rate and Rhythm: Normal rate and regular rhythm. Pulses: Normal pulses. Heart sounds: Normal heart sounds.    Pulmonary:      Effort: Pulmonary effort is normal.      Breath sounds: Normal breath sounds. Abdominal:      General: Abdomen is flat. Bowel sounds are normal.      Palpations: Abdomen is soft. Genitourinary:     Comments: Has large protruding bulge from vagina, able to be placed back in but them quickly comes back out with pressure or gravity. Rectal has 2 large external hemorrhodis with excoriation. Musculoskeletal: Normal range of motion. Skin:     General: Skin is warm and dry. Neurological:      General: No focal deficit present. Mental Status: She is alert and oriented to person, place, and time. ASSESSMENT and PLAN    ICD-10-CM ICD-9-CM    1. Bleeding external hemorrhoids K64.4 455.5  Anusol cream apply topically TID x 7 days   2. Vaginal prolapse N81.10 618.00 Referral to uro-gynecology Dr. Peggy Knox for treatment options. 3. Frequency of urination R35.0 788.41    4.  Urinary incontinence, unspecified type R32 788.30

## 2020-01-22 NOTE — PATIENT INSTRUCTIONS
Vaginal Prolapse: Care Instructions  Your Care Instructions    When the top of the vagina sags near or through the opening of the vagina, it is called vaginal prolapse. This may happen after surgery to remove the uterus. This is because the uterus no longer supports the vagina. This problem may cause you to leak urine or stool. Or you may have trouble passing urine or stool. You may feel pain during sex. Or you may feel pressure on your genitals. Medicine may help you feel better. You can also talk to your doctor about a device you put in your vagina. It may help with symptoms. Follow-up care is a key part of your treatment and safety. Be sure to make and go to all appointments, and call your doctor if you are having problems. It's also a good idea to know your test results and keep a list of the medicines you take. How can you care for yourself at home? · Do not do activities that put pressure on your pelvic muscles. This includes heavy lifting and straining. · Do exercises to tighten and strengthen your pelvic muscles. These are called Kegel exercises. To do them:  ? Squeeze the muscles you use to stop urine. Your belly and thighs should not move. ? Hold the squeeze for 3 seconds. Then relax for 3 seconds. ? Start with 3 seconds. Then add 1 second each week until you are able to squeeze for 10 seconds. ? Repeat this 10 to 15 times. Do these exercises 3 or more times a day. · Take an over-the-counter pain medicine, such as acetaminophen (Tylenol), ibuprofen (Advil, Motrin), or naproxen (Aleve), to relieve pain. Read and follow all instructions on the label. · Do not take two or more pain medicines at the same time unless the doctor told you to. Many pain medicines have acetaminophen, which is Tylenol. Too much acetaminophen (Tylenol) can be harmful. · Talk with your doctor about a vaginal pessary. This is a device that you put in your vagina to support it.  Your doctor can teach you how and when to remove it. You will also learn how to clean it and put it back in.  · If your doctor prescribes estrogen cream for your vagina, use it exactly as prescribed. · To relieve pressure on your vagina, lie down and put a pillow under your knees. Or you can lie on your side and bring your knees up to your chest.  · If you are overweight, talk to your doctor about safe ways to lose weight. When should you call for help? Watch closely for changes in your health, and be sure to contact your doctor if:    · You have new urinary symptoms. These may include leaking urine, having pain when urinating, or feeling like you need to urinate often.     · You have trouble passing stool.     · You have pain or a feeling of fullness in your vagina.     · You do not get better as expected. Where can you learn more? Go to http://andriy-francisco.info/. Enter M084 in the search box to learn more about \"Vaginal Prolapse: Care Instructions. \"  Current as of: February 19, 2019  Content Version: 12.2  © 2606-8809 [employer]. Care instructions adapted under license by [employer] (which disclaims liability or warranty for this information). If you have questions about a medical condition or this instruction, always ask your healthcare professional. [address] any warranty or liability for your use of this information.

## 2020-01-24 ENCOUNTER — TELEPHONE (OUTPATIENT)
Dept: GERIATRIC MEDICINE | Age: 85
End: 2020-01-24

## 2020-01-24 NOTE — TELEPHONE ENCOUNTER
Patient called to let us know that she received the cream ordered by NP. She started using it . She still has some bleeding from her hemorrhoids. I have advised patient that if this continue to call us. I will follow up with her on Monday.

## 2020-01-27 ENCOUNTER — TELEPHONE (OUTPATIENT)
Dept: GERIATRIC MEDICINE | Age: 85
End: 2020-01-27

## 2020-01-27 NOTE — TELEPHONE ENCOUNTER
I called patient and spoke with her. She is feeling better. She reports the hemorrhoid cream is helping . She is able to sit down  And having no rectal bleeding. She has an appt for next week with uro/gyn for bladder prolapse. I have asked patient to call us with any questions or concerns.

## 2020-03-11 DIAGNOSIS — I10 HYPERTENSION, UNSPECIFIED TYPE: ICD-10-CM

## 2020-03-11 RX ORDER — ATENOLOL 100 MG/1
100 TABLET ORAL DAILY
Qty: 90 TAB | Refills: 3 | Status: SHIPPED | OUTPATIENT
Start: 2020-03-11

## 2020-03-25 DIAGNOSIS — E78.2 MIXED HYPERLIPIDEMIA: ICD-10-CM

## 2020-03-25 RX ORDER — SIMVASTATIN 5 MG/1
5 TABLET, FILM COATED ORAL
Qty: 90 TAB | Refills: 1 | Status: SHIPPED | OUTPATIENT
Start: 2020-03-25

## 2020-04-14 DIAGNOSIS — M15.9 PRIMARY OSTEOARTHRITIS INVOLVING MULTIPLE JOINTS: Primary | ICD-10-CM

## 2020-04-14 DIAGNOSIS — E55.9 VITAMIN D DEFICIENCY: ICD-10-CM

## 2020-04-14 RX ORDER — MULTIVITAMIN
1 TABLET ORAL DAILY
Qty: 90 TAB | Refills: 2 | Status: SHIPPED | OUTPATIENT
Start: 2020-04-14

## 2020-04-20 ENCOUNTER — TELEPHONE (OUTPATIENT)
Dept: GERIATRIC MEDICINE | Age: 85
End: 2020-04-20

## 2020-04-20 NOTE — TELEPHONE ENCOUNTER
Patient called states she did nto receive her calcium she ordered last week. I advised I did order it from the pharmacy for it to be delivered. I called and spoke with Gilda Julian at [facility]. Patients calcium missed the delivery. He will send it out today. I called patient back to let her know.

## 2020-06-15 DIAGNOSIS — I10 HYPERTENSION, UNSPECIFIED TYPE: ICD-10-CM

## 2020-06-15 RX ORDER — HYDROCHLOROTHIAZIDE 12.5 MG/1
12.5 TABLET ORAL DAILY
Qty: 90 TAB | Refills: 0 | Status: SHIPPED | OUTPATIENT
Start: 2020-06-15

## 2020-06-15 NOTE — TELEPHONE ENCOUNTER
Patient called for medication refill. Refill request to be sent to [facility] for delivery.

## 2021-01-19 ENCOUNTER — HOSPITAL ENCOUNTER (EMERGENCY)
Age: 86
Discharge: HOME | End: 2021-01-19
Attending: EMERGENCY MEDICINE
Payer: MEDICARE

## 2021-01-19 VITALS
HEIGHT: 59 IN | DIASTOLIC BLOOD PRESSURE: 46 MMHG | BODY MASS INDEX: 23.51 KG/M2 | WEIGHT: 116.62 LBS | TEMPERATURE: 97.5 F | SYSTOLIC BLOOD PRESSURE: 149 MMHG | OXYGEN SATURATION: 100 % | RESPIRATION RATE: 18 BRPM | HEART RATE: 70 BPM

## 2021-01-19 DIAGNOSIS — R04.0 EPISTAXIS: Primary | ICD-10-CM

## 2021-01-19 PROCEDURE — 99283 EMERGENCY DEPT VISIT LOW MDM: CPT

## 2021-01-19 NOTE — ED PROVIDER NOTES
The history is provided by the patient. Epistaxis   This is a new problem. The current episode started 1 to 2 hours ago. The problem occurs constantly. The problem has been gradually improving. The problem is associated with nothing. The bleeding has been from the right nare. She has tried applying pressure for the symptoms. Her past medical history does not include bleeding disorder or frequent nosebleeds. Patient reports she does have a history of nosebleeds with the last nosebleed over 2 years ago. He is not taking any blood thinners.     Past Medical History:   Diagnosis Date    Allergic rhinitis     Arthritis     Benign hematuria     negative w/u by Dr. Bryce Holguin Female genital prolapse, unspecified 02/05/2020    Full incontinence of feces 02/05/2020    Hematuria     Hypercholesterolemia     Hypertension     Osteoarthritis     Osteopenia     Overactive bladder     Presence of intraocular lens 10/24/2019    Primary open-angle glaucoma, bilateral, moderate stage 10/24/2019    Rosacea     Rosacea conjunctivitis(372.31)     Shingles     Stress incontinence     Unspecified hemorrhoids 02/05/2020    Urge incontinence 02/05/2020       Past Surgical History:   Procedure Laterality Date    ENDOSCOPY, COLON, DIAGNOSTIC  7/2005    (Ursula Dodson)    HX CATARACT REMOVAL  7/2004    bilateral    HX ORTHOPAEDIC      toe surgery for bone spurs    HX IRENE AND BSO  1980    DC TOTAL HIP ARTHROPLASTY  2000    left    DC TOTAL HIP ARTHROPLASTY  2001    right         Family History:   Problem Relation Age of Onset    Stroke Mother         x2    Heart Attack Mother 61    Parkinsonism Father     Breast Cancer Sister     Hypertension Sister     Arthritis-osteo Sister     Heart defect Sister         MVP    Stroke Sister     Arthritis-osteo Sister     Hypertension Sister     Diabetes Sister     Breast Cancer Sister     Other Sister         polycythemia vera    Allergic Rhinitis Son     Hypertension Son  Headache Son     Allergic Rhinitis Daughter     Allergic Rhinitis Son        Social History     Socioeconomic History    Marital status:      Spouse name: Not on file    Number of children: Not on file    Years of education: Not on file    Highest education level: Not on file   Occupational History    Not on file   Social Needs    Financial resource strain: Not on file    Food insecurity     Worry: Not on file     Inability: Not on file    Transportation needs     Medical: Not on file     Non-medical: Not on file   Tobacco Use    Smoking status: Never Smoker    Smokeless tobacco: Never Used   Substance and Sexual Activity    Alcohol use: No     Alcohol/week: 0.0 standard drinks    Drug use: No    Sexual activity: Not Currently     Partners: Male   Lifestyle    Physical activity     Days per week: Not on file     Minutes per session: Not on file    Stress: Not on file   Relationships    Social connections     Talks on phone: Not on file     Gets together: Not on file     Attends Sikhism service: Not on file     Active member of club or organization: Not on file     Attends meetings of clubs or organizations: Not on file     Relationship status: Not on file    Intimate partner violence     Fear of current or ex partner: Not on file     Emotionally abused: Not on file     Physically abused: Not on file     Forced sexual activity: Not on file   Other Topics Concern    Not on file   Social History Narrative    Not on file         ALLERGIES: Latex, Orabase (benzocaine) [benzocaine], Adhesive, Codeine, Darvocet a500 [propoxyphene n-acetaminophen], Hydrocodone, and Myrbetriq [mirabegron]    Review of Systems   HENT: Positive for nosebleeds. Negative for postnasal drip and sore throat. Respiratory: Negative for chest tightness and shortness of breath. Cardiovascular: Negative for chest pain. Gastrointestinal: Negative for abdominal distention. Neurological: Negative. Psychiatric/Behavioral: Negative. All other systems reviewed and are negative. Vitals:    01/19/21 0936 01/19/21 1000 01/19/21 1001 01/19/21 1020   BP: (!) 178/60 (!) 149/46     Pulse: 70      Resp: 18      Temp: 97.5 °F (36.4 °C)      SpO2: 99%  99% 100%   Weight: 52.9 kg (116 lb 10 oz)      Height: 4' 11\" (1.499 m)               Physical Exam  Vitals signs and nursing note reviewed. Constitutional:       Appearance: Normal appearance. HENT:      Head:      Comments: Small clot noted in right nare on presenting exam.  No active bleeding. Naris pinched and patient will be observed further     Nose:      Right Nostril: Epistaxis present. No foreign body or septal hematoma. Left Nostril: No foreign body, epistaxis, septal hematoma or occlusion. Mouth/Throat:      Mouth: Mucous membranes are moist.      Comments: No blood noted running down the posterior pharynx. Eyes:      Extraocular Movements: Extraocular movements intact. Pupils: Pupils are equal, round, and reactive to light. Neck:      Musculoskeletal: Normal range of motion. Cardiovascular:      Rate and Rhythm: Normal rate and regular rhythm. Pulmonary:      Effort: Pulmonary effort is normal.   Abdominal:      General: Abdomen is flat. Musculoskeletal: Normal range of motion. Skin:     General: Skin is dry. Capillary Refill: Capillary refill takes less than 2 seconds. Neurological:      General: No focal deficit present. Mental Status: She is alert and oriented to person, place, and time. Psychiatric:         Mood and Affect: Mood normal.         Behavior: Behavior normal.         Thought Content:  Thought content normal.         Judgment: Judgment normal.          MDM  Number of Diagnoses or Management Options  Epistaxis: new and does not require workup  Risk of Complications, Morbidity, and/or Mortality  Presenting problems: low  Diagnostic procedures: low  Management options: low    Patient Progress  Patient progress: stable    ED Course as of Jan 19 1122   Tue Jan 19, 2021   1037 Patient examined on presentation and clot had formed in right nostril. Epistaxis clamps placed. Patient re-evaluated and no bleeding. Patient instructed to blow her nose which removed small amount of clot. Q-tips used to evacuate small clot. Source of bleeding appears to be at the nasal septum which is now adequately clotted. No active bleeding at this time. [KS]      ED Course User Index  [KS] Bharti Gasca MD       Procedures      LABORATORY TESTS:  No results found for this or any previous visit (from the past 12 hour(s)). IMAGING RESULTS: n/a    MEDICATIONS GIVEN:  Medications - No data to display    IMPRESSION:  1. Epistaxis        PLAN:  1. Discharge to home  2.   Return to ED if worse

## 2021-01-19 NOTE — ED TRIAGE NOTES
Pt arrived via EMS from [facility]. Pt reports \"she started with a nosebleed this am when she woke up. \" Pt reports history of nosebleeds in the past. Pt reports she takes aspirin daily. Pt denies all other complaints at this time.

## 2021-01-19 NOTE — ED NOTES
Patient ambulatory from ED accompanied by this RN. Daughter providing transport home. Pt verbalized understanding of all education prior to discharge and remains without bleeding while at [facility].

## 2021-01-19 NOTE — ED NOTES
Daughter contacted via phone for discharge transportation. Daughter states she is on the way to [facility] at this time.

## 2021-04-15 NOTE — TELEPHONE ENCOUNTER
Pt called this morning wanting to know when will the [address] [address] [address] call her for a consult. Pt would like to sign up for the dietician consult. Please call her once this task has been completed. Reviewed with Dr Abdi, pt to schedule appt with Dr Abdi to discuss possible . Pt to take Xarelto only at this time and if  is decided in future pt will go on plavix then. Plavix rx was not sent. Spoke with brother reviewed all instructions and recommendations from Dr Abdi. Brother verbalized understanding of conversation and is in agreement with plan of care. Appt scheduled with Dr Abdi.

## 2022-03-19 PROBLEM — K90.0 CELIAC DISEASE: Status: ACTIVE | Noted: 2018-10-26

## 2022-03-19 PROBLEM — M15.9 PRIMARY OSTEOARTHRITIS INVOLVING MULTIPLE JOINTS: Status: ACTIVE | Noted: 2018-04-16

## 2023-02-10 ENCOUNTER — HOSPITAL ENCOUNTER (EMERGENCY)
Age: 88
Discharge: HOME | End: 2023-02-10
Attending: STUDENT IN AN ORGANIZED HEALTH CARE EDUCATION/TRAINING PROGRAM
Payer: MEDICARE

## 2023-02-10 VITALS
HEART RATE: 65 BPM | OXYGEN SATURATION: 97 % | BODY MASS INDEX: 21.42 KG/M2 | RESPIRATION RATE: 20 BRPM | HEIGHT: 59 IN | DIASTOLIC BLOOD PRESSURE: 54 MMHG | SYSTOLIC BLOOD PRESSURE: 153 MMHG | TEMPERATURE: 98.7 F | WEIGHT: 106.26 LBS

## 2023-02-10 DIAGNOSIS — K62.89 RECTAL PAIN: Primary | ICD-10-CM

## 2023-02-10 DIAGNOSIS — K62.3 RP (RECTAL PROLAPSE): ICD-10-CM

## 2023-02-10 PROCEDURE — 99282 EMERGENCY DEPT VISIT SF MDM: CPT

## 2023-02-10 RX ORDER — VIBEGRON 75 MG/1
75 TABLET, FILM COATED ORAL DAILY
COMMUNITY

## 2023-02-10 RX ORDER — CELECOXIB 100 MG/1
100 CAPSULE ORAL 2 TIMES DAILY
COMMUNITY

## 2023-02-10 RX ORDER — CETIRIZINE HYDROCHLORIDE 10 MG/1
1 CAPSULE, LIQUID FILLED ORAL
COMMUNITY

## 2023-02-10 RX ORDER — POLYETHYLENE GLYCOL 3350 17 G/17G
17 POWDER, FOR SOLUTION ORAL
COMMUNITY

## 2023-02-10 RX ORDER — ESCITALOPRAM OXALATE 10 MG/1
10 TABLET ORAL DAILY
COMMUNITY

## 2023-02-10 RX ORDER — FAMOTIDINE 40 MG/1
40 TABLET, FILM COATED ORAL 2 TIMES DAILY
COMMUNITY

## 2023-02-10 NOTE — DISCHARGE INSTRUCTIONS
You presented to ED after having rectal prolapse at home/at her facility. You had some bleeding at that time. On exam here in the ED prolapse has resolved. As you have no further complaints discharge home is appropriate. Recommend close follow-up with colorectal surgery. Number including discharge paperwork. Recommend coming in if you have rectal prolapse in the future for fast reduction because the mucosa of the rectum is not meant to be exposed to the air and this can cause worsening bleeding and symptoms.

## 2023-02-10 NOTE — ED NOTES
Pt discharged in stable condition at this time. MD/[facility] reviewed discharge instructions, prescriptions, and follow up with patient at bedside. Pt verbalized understanding and denies any needs or questions at this time. Pt NAD on DC ambulatory with her rollator per her baseline.  Pt to be driven home by her daughter at bedside

## 2023-02-10 NOTE — ED NOTES
MD at bedside at this time for chaperoned rectal eval. Pt does not have prolapsed rectum present. Daughter at bedside states she has had ongoing and intermittent rectal prolapses for a week. MD deferred rectal exam at this time d/t recent spont reduction of prolapse. Pt NAD and dressed at this time. MD for referral to rectal surgeon.

## 2023-02-10 NOTE — ED TRIAGE NOTES
Pt with history of bladder and rectal prolapse. Pt with a pessary in place, but daughter reports that today, the patient's rectum is protruding approximately 5 inches. +fecal incontinence and rectal bleeding.

## 2023-02-11 NOTE — ED PROVIDER NOTES
Patient is a 80-year-old female presented to ED with family member with concern for rectal prolapse. Per family patient had about 5 inches of prolapsed rectum recently with some bleeding. Upon betting immediate rectal exam was done and no prolapse was present. Patient denies any pain. No fevers, chest pain or shortness of breath. Family also question the patient states that she is back to her baseline. Anal Pain   Associated symptoms include rectal pain. Pertinent negatives include no chest pain.       Past Medical History:   Diagnosis Date    Allergic rhinitis     Arthritis     Benign hematuria     negative w/u by Dr. Emilie Naranjo    Female genital prolapse, unspecified 02/05/2020    Full incontinence of feces 02/05/2020    Hematuria     Hypercholesterolemia     Hypertension     Osteoarthritis     Osteopenia     Overactive bladder     Presence of intraocular lens 10/24/2019    Primary open-angle glaucoma, bilateral, moderate stage 10/24/2019    Rectal prolapse     Rosacea     Rosacea conjunctivitis(372.31)     Shingles     Stress incontinence     Unspecified hemorrhoids 02/05/2020    Urge incontinence 02/05/2020       Past Surgical History:   Procedure Laterality Date    ENDOSCOPY, COLON, DIAGNOSTIC  7/2005    ([address])    HX CATARACT REMOVAL  7/2004    bilateral    HX ORTHOPAEDIC      toe surgery for bone spurs    HX IRENE AND BSO  1980    ME ARTHRP ACETBLR/PROX FEM PROSTC AGRFT/ALGRFT  2000    left    ME ARTHRP ACETBLR/PROX FEM PROSTC AGRFT/ALGRFT  2001    right         Family History:   Problem Relation Age of Onset    Stroke Mother         x2    Heart Attack Mother 61    Parkinsonism Father     Breast Cancer Sister     Hypertension Sister     OSTEOARTHRITIS Sister     Heart defect Sister         MVP    Stroke Sister     OSTEOARTHRITIS Sister     Hypertension Sister     Diabetes Sister     Breast Cancer Sister     Other Sister         polycythemia vera    Allergic Rhinitis Son     Hypertension Son     Headache Son     Allergic Rhinitis Daughter     Allergic Rhinitis Son        Social History     Socioeconomic History    Marital status:      Spouse name: Not on file    Number of children: Not on file    Years of education: Not on file    Highest education level: Not on file   Occupational History    Not on file   Tobacco Use    Smoking status: Never    Smokeless tobacco: Never   Substance and Sexual Activity    Alcohol use: No     Alcohol/week: 0.0 standard drinks    Drug use: No    Sexual activity: Not Currently     Partners: Male   Other Topics Concern    Not on file   Social History Narrative    Not on file     Social Determinants of Health     Financial Resource Strain: Not on file   Food Insecurity: Not on file   Transportation Needs: Not on file   Physical Activity: Not on file   Stress: Not on file   Social Connections: Not on file   Intimate Partner Violence: Not on file   Housing Stability: Not on file         ALLERGIES: Latex, Orabase (benzocaine) [benzocaine], Adhesive, Codeine, Darvocet a500 [propoxyphene n-acetaminophen], Hydrocodone, and Myrbetriq [mirabegron]    Review of Systems   Respiratory:  Negative for shortness of breath. Cardiovascular:  Negative for chest pain. Gastrointestinal:  Positive for anal bleeding and rectal pain. Vitals:    02/10/23 1315   BP: (!) 153/54   Pulse: 65   Resp: 20   Temp: 98.7 °F (37.1 °C)   SpO2: 97%   Weight: 48.2 kg (106 lb 4.2 oz)   Height: 4' 11\" (1.499 m)            Physical Exam  Vitals and nursing note reviewed. Constitutional:       Appearance: Normal appearance. HENT:      Head: Normocephalic and atraumatic. Right Ear: External ear normal.      Left Ear: External ear normal.   Eyes:      Conjunctiva/sclera: Conjunctivae normal.   Cardiovascular:      Rate and Rhythm: Normal rate and regular rhythm. Pulses: Normal pulses. Heart sounds: Normal heart sounds.    Pulmonary:      Effort: Pulmonary effort is normal.      Breath sounds: Normal breath sounds. Chest:      Chest wall: No deformity or tenderness. Abdominal:      Palpations: Abdomen is soft. Tenderness: There is no abdominal tenderness. Genitourinary:     Rectum: Normal. No external hemorrhoid. Comments: Patient rectal prolapse. No external hemorrhoids. Musculoskeletal:         General: No deformity or signs of injury. Normal range of motion. Skin:     General: Skin is warm and dry. Coloration: Skin is not jaundiced. Neurological:      General: No focal deficit present. Mental Status: She is alert and oriented to person, place, and time. Psychiatric:         Mood and Affect: Mood normal.         Behavior: Behavior normal.        Medical Decision Making  Amount and/or Complexity of Data Reviewed  Independent Historian: caregiver           MEDICATIONS GIVEN:  Medications - No data to display    Differential diagnosis: Rectal prolapse, bladder prolapse, dementia, rectal bleeding    MDM: Patient is a 77-year-old female presented the ED with family concern for observed rectal prolapse. No prolapse on physical exam the ED. Suspect spontaneous reduction. Family given strict return precautions as well as information for portal surgery. Patient has no complaints and stable vital signs and stable for discharge. Further personalized recommendations for outpatient care as below. Key discharge instructions and summary of care: You presented to ED after having rectal prolapse at home/at her facility. You had some bleeding at that time. On exam here in the ED prolapse has resolved. As you have no further complaints discharge home is appropriate. Recommend close follow-up with colorectal surgery. Number including discharge paperwork. Recommend coming in if you have rectal prolapse in the future for fast reduction because the mucosa of the rectum is not meant to be exposed to the air and this can cause worsening bleeding and symptoms.     Patient is stable for discharge. All available radiology and laboratory results have been reviewed with patient and/or available family. Patient and/or family verbally conveyed their understanding and agreement of the patient's signs, symptoms, diagnosis, treatment and prognosis and additionally agree to follow-up as recommended in the discharge instructions or to return to the Emergency Department should their condition change or worsen prior to their follow-up appointment. All questions have been answered and patient and/or available family express understanding. IMPRESSION:  1. Rectal pain    2. RP (rectal prolapse)        DISPOSITION: Discharged    Odilon Blackman MD    Procedures

## 2023-10-27 NOTE — PATIENT INSTRUCTIONS
Celiac Disease: Care Instructions  Your Care Instructions  Celiac disease (or celiac sprue) is a problem with digesting gluten. Gluten is a type of protein found in wheat, rye, and other grains. This problem starts when the body's immune system attacks the small intestine when gluten is eaten. The immune system is supposed to fight off viruses and other invaders, but sometimes it turns on the person's own body. (This is called an autoimmune disease.) Celiac disease seems to run in families. Celiac disease causes damage to the small intestine. This makes it hard for the body to absorb vitamins and other nutrients. You cannot prevent celiac disease. But you can stop and reverse the damage to the small intestine by eating a strict gluten-free diet. Follow-up care is a key part of your treatment and safety. Be sure to make and go to all appointments, and call your doctor if you are having problems. It's also a good idea to know your test results and keep a list of the medicines you take. How can you care for yourself at home? · Eat a gluten-free diet to prevent symptoms and damage to the small intestine. Even a small amount of gluten may cause damage. ? Avoid all foods that contain wheat, rye, and barley gluten. Bread, bagels, pasta, pizza, malted breakfast cereals, and crackers are all examples of foods that contain gluten. ? Avoid oats, at least at first. Oats may cause symptoms in some people. The oats may be contaminated with wheat, barley, or rye from processing. But many people who have celiac disease can eat moderate amounts of oats without having symptoms. Health professionals vary in their long-term recommendations regarding eating foods with oats. But most agree it is safe to eat oats labeled as gluten-free. · You may need to avoid milk and milk products for a while.  Once you stop eating any gluten, the intestine will begin to heal. Then it should be okay to drink milk and eat milk products. · Read food labels carefully and look for hidden gluten, such as gluten in medicine and some food additives. If a label says \"modified food starch,\" the product may contain gluten. · Plan your diet around:  ? Eggs. ? Dairy products, if you can eat them. Cheese, yogurt, and other dairy products can be an important part of the diet. ? Flours and foods made with amaranth, arrowroot, beans, buckwheat, corn, cornmeal, flax, millet, potatoes, gluten-free nut and oat bran, quinoa, rice, sorghum, soybeans, tapioca, or teff. ? Fresh, frozen, and canned meats, fruits, and vegetables. Watch for added gluten. · Talk to your doctor or contact your local hospital or dietitian for information about support groups in your area. You may find a support group helpful for discovering ways to help you deal with celiac disease. Celiac disease support groups often share recipes and good food sources. · Look for gluten-free foods. Many food stores, especially health food stores, offer specially marked gluten-free food. When should you call for help? Watch closely for changes in your health, and be sure to contact your doctor if:    · Your bloating, gas, and diarrhea get worse.     · You have bloating, gas, and diarrhea after not having them for a while. Where can you learn more? Go to http://andriy-francisco.info/. Enter [id number] in the search box to learn more about \"Celiac Disease: Care Instructions. \"  Current as of: November 7, 2018  Content Version: 12.2  © 1555-0404 [employer]. Care instructions adapted under license by [employer] (which disclaims liability or warranty for this information). If you have questions about a medical condition or this instruction, always ask your healthcare professional. [address] any warranty or liability for your use of this information.          Earwax Blockage: Care Instructions  Your Care Instructions    Earwax is a natural substance that protects the ear canal. Normally, earwax drains from the ears and does not cause problems. Sometimes earwax builds up and hardens. Earwax blockage (also called cerumen impaction) can cause some loss of hearing and pain. When wax is tightly packed, you will need to have your doctor remove it. Follow-up care is a key part of your treatment and safety. Be sure to make and go to all appointments, and call your doctor if you are having problems. It's also a good idea to know your test results and keep a list of the medicines you take. How can you care for yourself at home? · Do not try to remove earwax with cotton swabs, fingers, or other objects. This can make the blockage worse and damage the eardrum. · If your doctor recommends that you try to remove earwax at home:  ? Soften and loosen the earwax with warm mineral oil. You also can try hydrogen peroxide mixed with an equal amount of room temperature water. Place 2 drops of the fluid, warmed to body temperature, in the ear two times a day for up to 5 days. ? Once the wax is loose and soft, all that is usually needed to remove it from the ear canal is a gentle, warm shower. Direct the water into the ear, then tip your head to let the earwax drain out. Dry your ear thoroughly with a hair dryer set on low. Hold the dryer several inches from your ear. ? If the warm mineral oil and shower do not work, use an over-the-counter wax softener. Read and follow all instructions on the label. After using the wax softener, use an ear syringe to gently flush the ear. Make sure the flushing solution is body temperature. Cool or hot fluids in the ear can cause dizziness. When should you call for help?   Call your doctor now or seek immediate medical care if:    · Pus or blood drains from your ear.     · Your ears are ringing or feel full.     · You have a loss of hearing.    Watch closely for changes in your health, and be sure to contact your doctor if:    · You have pain or reduced hearing after 1 week of home treatment.     · You have any new symptoms, such as nausea or balance problems. Where can you learn more? Go to http://andriy-francisco.info/. Enter Z674 in the search box to learn more about \"Earwax Blockage: Care Instructions. \"  Current as of: June 26, 2019  Content Version: 12.2  © 3833-8259 [employer]. Care instructions adapted under license by [employer] (which disclaims liability or warranty for this information). If you have questions about a medical condition or this instruction, always ask your healthcare professional. [address] any warranty or liability for your use of this information. patient